# Patient Record
Sex: MALE | Race: OTHER | NOT HISPANIC OR LATINO
[De-identification: names, ages, dates, MRNs, and addresses within clinical notes are randomized per-mention and may not be internally consistent; named-entity substitution may affect disease eponyms.]

---

## 2021-09-28 ENCOUNTER — APPOINTMENT (OUTPATIENT)
Dept: UROLOGY | Facility: CLINIC | Age: 76
End: 2021-09-28
Payer: MEDICARE

## 2021-09-28 PROCEDURE — 51798 US URINE CAPACITY MEASURE: CPT

## 2021-09-28 PROCEDURE — 99204 OFFICE O/P NEW MOD 45 MIN: CPT

## 2021-09-28 NOTE — PHYSICAL EXAM
[General Appearance - Well Developed] : well developed [General Appearance - Well Nourished] : well nourished [Normal Appearance] : normal appearance [Well Groomed] : well groomed [General Appearance - In No Acute Distress] : no acute distress [Edema] : no peripheral edema [Respiration, Rhythm And Depth] : normal respiratory rhythm and effort [Exaggerated Use Of Accessory Muscles For Inspiration] : no accessory muscle use [Abdomen Soft] : soft [Abdomen Tenderness] : non-tender [Costovertebral Angle Tenderness] : no ~M costovertebral angle tenderness [Urethral Meatus] : meatus normal [Scrotum] : the scrotum was normal [Testes Tenderness] : no tenderness of the testes [Testes Mass (___cm)] : there were no testicular masses [Prostate Tenderness] : the prostate was not tender [No Prostate Nodules] : no prostate nodules [Prostate Size ___ gm] : prostate size [unfilled] gm [Normal Station and Gait] : the gait and station were normal for the patient's age [] : no rash [No Focal Deficits] : no focal deficits [Oriented To Time, Place, And Person] : oriented to person, place, and time [Affect] : the affect was normal [Mood] : the mood was normal [Not Anxious] : not anxious [No Palpable Adenopathy] : no palpable adenopathy

## 2021-09-28 NOTE — HISTORY OF PRESENT ILLNESS
[FreeTextEntry1] : Dear Dr. Darryl Mckeon \par \par CC: renal mass, elevated PSA\par \par  HPI: Mr. Ferrer is a 75 year old AA male who resides in [place] who was  being evaluated for 20 lb weight loss over 1 year with the inability to gain weight.  He is now gaining weight by taking Ensure.  As part of the evaluation he underwent a CT\par \par CT: lower pole LEFT kidney 1.8 x 1.6 x 2.0 cm slightly exophytic solid enhancing mass, multiple bilateral simple cysts. markedly enlarged prostate gland with distended bladder and bladder diverticuli.\par \par PSA 7/6/21: 20.66 ng/ml\par Prostate biopsy 2011, negative with Dr. Melendrez\par \par Was taking Saw Palmetto\par Off medication notices weak stream, occasional staining to intimate stream, urinary frequency, nocturia x 2 , and sometimes need to defecate while urinating.\par \par \par PMH: DM, HTN, CAD, peripheral angiopathy\par PSH:none\par FAMHX:no prostate cancer\par SOCIAL: nonsmoker, no ETOH, retired  from [employer] \par ROS: negative 10 system review\par

## 2021-09-28 NOTE — ASSESSMENT
[FreeTextEntry1] : 75 year old AA male who presents today for finding of renal mass and elevated PSA\par -PVR was 555 cc\par -recommend catheter placement today, patient hesitant about having catheter for extended period of time.  Discussed risk of infection and worsening retention.  No hydronephrosis noted on CT report.  Jaquelin hold off on catheter placement.\par -repeat PSA today.\par -prostate MRI\par -given size of renal lesion would recommend AS at this time and plan on repeat imaging in 6 months to evaluate growth.\par \par \par RTC after MRI to discuss next steps to alleviate retention \par \par

## 2021-09-29 LAB
PSA FREE FLD-MCNC: 21 %
PSA FREE SERPL-MCNC: 12 NG/ML
PSA SERPL-MCNC: 58.4 NG/ML

## 2021-10-06 LAB — BACTERIA UR CULT: NORMAL

## 2021-10-26 ENCOUNTER — NON-APPOINTMENT (OUTPATIENT)
Age: 76
End: 2021-10-26

## 2021-11-01 ENCOUNTER — APPOINTMENT (OUTPATIENT)
Dept: UROLOGY | Facility: CLINIC | Age: 76
End: 2021-11-01
Payer: MEDICARE

## 2021-11-01 VITALS
TEMPERATURE: 98.8 F | HEART RATE: 76 BPM | SYSTOLIC BLOOD PRESSURE: 154 MMHG | WEIGHT: 144 LBS | BODY MASS INDEX: 23.14 KG/M2 | DIASTOLIC BLOOD PRESSURE: 76 MMHG | HEIGHT: 66 IN

## 2021-11-01 DIAGNOSIS — Z78.9 OTHER SPECIFIED HEALTH STATUS: ICD-10-CM

## 2021-11-01 PROCEDURE — 99214 OFFICE O/P EST MOD 30 MIN: CPT

## 2021-11-01 NOTE — PHYSICAL EXAM
[General Appearance - Well Developed] : well developed [General Appearance - Well Nourished] : well nourished [Normal Appearance] : normal appearance [Well Groomed] : well groomed [General Appearance - In No Acute Distress] : no acute distress [Edema] : no peripheral edema [Respiration, Rhythm And Depth] : normal respiratory rhythm and effort [Exaggerated Use Of Accessory Muscles For Inspiration] : no accessory muscle use [Abdomen Soft] : soft [Abdomen Tenderness] : non-tender [Abdomen Hernia] : no hernia was discovered [Costovertebral Angle Tenderness] : no ~M costovertebral angle tenderness [Urethral Meatus] : meatus normal [Penis Abnormality] : normal uncircumcised penis [Urinary Bladder Findings] : the bladder was normal on palpation [Scrotum] : the scrotum was normal [Testes Tenderness] : no tenderness of the testes [Testes Mass (___cm)] : there were no testicular masses [Prostate Tenderness] : the prostate was not tender [No Prostate Nodules] : no prostate nodules [Normal Station and Gait] : the gait and station were normal for the patient's age [] : no rash [No Focal Deficits] : no focal deficits [Oriented To Time, Place, And Person] : oriented to person, place, and time [Affect] : the affect was normal [Mood] : the mood was normal [Not Anxious] : not anxious [No Palpable Adenopathy] : no palpable adenopathy

## 2021-11-02 LAB
ANION GAP SERPL CALC-SCNC: 14 MMOL/L
APPEARANCE: ABNORMAL
BACTERIA: NEGATIVE
BASOPHILS # BLD AUTO: 0.04 K/UL
BASOPHILS NFR BLD AUTO: 0.5 %
BILIRUBIN URINE: NEGATIVE
BLOOD URINE: NEGATIVE
BUN SERPL-MCNC: 15 MG/DL
CALCIUM SERPL-MCNC: 9.8 MG/DL
CHLORIDE SERPL-SCNC: 105 MMOL/L
CO2 SERPL-SCNC: 24 MMOL/L
COLOR: NORMAL
CREAT SERPL-MCNC: 1.07 MG/DL
EOSINOPHIL # BLD AUTO: 0.02 K/UL
EOSINOPHIL NFR BLD AUTO: 0.2 %
GLUCOSE QUALITATIVE U: NEGATIVE
GLUCOSE SERPL-MCNC: 98 MG/DL
HCT VFR BLD CALC: 47.2 %
HGB BLD-MCNC: 14.6 G/DL
HYALINE CASTS: 0 /LPF
IMM GRANULOCYTES NFR BLD AUTO: 0.4 %
KETONES URINE: NEGATIVE
LEUKOCYTE ESTERASE URINE: NEGATIVE
LYMPHOCYTES # BLD AUTO: 3.47 K/UL
LYMPHOCYTES NFR BLD AUTO: 43.3 %
MAN DIFF?: NORMAL
MCHC RBC-ENTMCNC: 29.9 PG
MCHC RBC-ENTMCNC: 30.9 GM/DL
MCV RBC AUTO: 96.7 FL
MICROSCOPIC-UA: NORMAL
MONOCYTES # BLD AUTO: 0.49 K/UL
MONOCYTES NFR BLD AUTO: 6.1 %
NEUTROPHILS # BLD AUTO: 3.96 K/UL
NEUTROPHILS NFR BLD AUTO: 49.5 %
NITRITE URINE: NEGATIVE
PH URINE: 6
PLATELET # BLD AUTO: 262 K/UL
POTASSIUM SERPL-SCNC: 4.4 MMOL/L
PROTEIN URINE: NORMAL
RBC # BLD: 4.88 M/UL
RBC # FLD: 13.9 %
RED BLOOD CELLS URINE: 1 /HPF
SODIUM SERPL-SCNC: 143 MMOL/L
SPECIFIC GRAVITY URINE: 1.02
SQUAMOUS EPITHELIAL CELLS: 1 /HPF
URIC ACID CRYSTALS: ABNORMAL
UROBILINOGEN URINE: NORMAL
WBC # FLD AUTO: 8.01 K/UL
WHITE BLOOD CELLS URINE: 1 /HPF

## 2021-11-02 NOTE — HISTORY OF PRESENT ILLNESS
[FreeTextEntry1] : Dear ARANZA Gonzalez,\par Dear Dr Darryl Mckeon (PCP fax #[phone], phone # [phone]),\par \par Thank you so much for the referral to help care for your patient.\par \par Chief Complaint Biopsy Consult\par Date of first visit: 11/01/2021\par Retired from [employer]\par \par NATIVIDAD RODRIGUEZ is a 76 year old Black man with PMHx DM, BPH, HTN, HLD, 20 lb wt loss/1 year with incidental finding of renal mass (on AS with Dr Dubois) and elevated PSA who presents for biopsy consult. His most recent PSA is 58.4 ng/mL, urine culture was negative at the time. MRI on 10/18/21 demonstrated a PIRADS 5 lesion, left mid pzpl, as well as multiple enhancing bone lesions concerning for metastases. His PSA density is elevated 0.41 ng/ml/cc. He has had one prior negative biopsy in 2011. He does not have family hx of  malignancies.\par \par PSA Hx:\par 58.40 on 9/29/21\par 20.66 7/6/21\par \par MRI at [facility] on 10/18/2021. 5.9 x 6.1 x 7.6 cm; volume 142 mL prostate with PIRADS 5  lesion measuring 1.6 cm Left posterolateral midgland peripheral zone .  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 The images have been reviewed and clinical implications discussed with the patient.\par \par Biopsy Hx\par 2011 with Dr Melendrez-negative\par \par 11/01/2021 \par IPSS 14 QOL 3 \par BOB 17\par \par Prostate cancer screening: the patient and I spoke at length about prostate cancer screening, its risks and its benefits. The patient has 4 (age, PSA, race, MRI) risk factors for prostate cancer.  He understands that many men with prostate cancer will die with the disease rather than of it and we also discussed the results large multi-center American and  prostate cancer screening trials. He also understands that PSA in and of itself does not diagnose prostate cancer but only assesses risk to a certain degree. The patient understands that to definitively screen for prostate cancer, a biopsy is required and this procedure has risks, including bleeding, infection, ED and urinary retention. The patient opted to proceed with fusion biopsy.\par \par The patient denies fevers, chills, nausea and or vomiting and no unexplained weight loss.\par \par All pertinent laboratory, films and physician notes were reviewed.  Questionnaire results were discussed with patient.\par \par I spent 31 minutes of non-face to face time reviewing the patient's records, chart, uploading processing MR images, transferring MR images from PACS to an independent workstation, reviewing images on independent workstation, speaking with their physician and planning their prostate biopsy and preparation of an upcoming visit for 11/01/2021 .  The imaging and planning was highly complex and took this entire time.

## 2021-11-02 NOTE — ASSESSMENT
[FreeTextEntry1] : 76 year old Black man with PMHx DM, BPH, HTN, HLD, 20 lb wt loss/1 year with incidental finding of renal mass (on AS with Dr Dubois) and elevated PSA who presents for biopsy consult. His most recent PSA is 58.4 ng/mL, urine culture was negative at the time. MRI on 10/18/21 demonstrated a PIRADS 5 lesion, left mid pzpl, as well as multiple enhancing bone lesions concerning for metastases. His PSA density is elevated 0.41 ng/ml/cc. He has had one prior negative biopsy in 2011. Neg FH, neg GLENN.\par \par 1. Start calcium/vit D supplementation for bone health in anticipation of upcoming treatment\par 2. --start Flomax- he patient understands that this medication may cause dizziness, retrograde ejaculation or nasal congestion among other complications. I recommended that the patient take this medication at night. If the side effects become too bothersome, the medication can be discontinued.\par 3. Refer to Dr Mancini and PSMA after biopsy\par \par IRB Notification\par \par The patient has been made aware of the opportunity to participate in our MR US fusion guided biopsy trial testing the next generation biopsy technology.  This is an IRB approved trial (IRB #).  He is already being consented for a standard MR US fusion guided biopsy therefore there is no change in his clinical work flow.  He has been given a copy of the consent to review before the biopsy date and given an opportunity to contact us with any further questions.  He will be consented on the day of the biopsy procedure.\par \par He understands that many men with prostate cancer will die with the disease rather than of it and we also discussed the results large multi-center American and  prostate cancer screening trials. He also understands that PSA in and of itself does not diagnose prostate cancer but only assesses risk to a certain degree. The patient understands that to definitively screen for prostate cancer, a biopsy is required and this procedure has risks, including bleeding, infection, ED and urinary retention. The patient opted to move forward with the biopsy.\par \par The patient is aware to expect hematuria x 2 weeks and upto 4 weeks of hematospermia.  There is a risk of infection albeit much lower than a transrectal approach. In some cases patients can experience erectile dysfunction but this is usually self limiting.  Any fever/chills after the biopsy the patient is to contact the office and go to the ER for an immediate evaluation. He has been given paper instructions outlining these items - which includes medications to avoid prior to surgery.\par \par 1. CBC, BMP, PSA, Covid Test, UA UCx. EKG\par 2. Medical Clearance\par 3. TP biopsy at [facility]\par 4. follow up 2 weeks after biopsy with his primary urologist or ourselves.\par 5. we will call with the path results once they are resulted.\par \par Thank you very much for allowing me to assist in the care of this patient. Should you have any additional questions or concerns please do not hesitate to contact me.\par \par \par Sincerely,\par \par \par Patrick Tejada D.O.\par  of Urology and Radiology\par  of Urology at [facility]\par System Director for Prostate Cancer\par [address], [address] [facility], [facility]\par Phone: [phone]\par

## 2021-11-03 LAB — BACTERIA UR CULT: NORMAL

## 2021-11-08 ENCOUNTER — OUTPATIENT (OUTPATIENT)
Dept: OUTPATIENT SERVICES | Facility: HOSPITAL | Age: 76
LOS: 1 days | End: 2021-11-08
Payer: MEDICARE

## 2021-11-08 PROCEDURE — G1004: CPT

## 2021-11-08 PROCEDURE — A9503: CPT

## 2021-11-08 PROCEDURE — 78306 BONE IMAGING WHOLE BODY: CPT | Mod: 26,MG

## 2021-11-08 PROCEDURE — 78306 BONE IMAGING WHOLE BODY: CPT | Mod: MG

## 2021-11-09 LAB — SARS-COV-2 N GENE NPH QL NAA+PROBE: NOT DETECTED

## 2021-11-10 ENCOUNTER — NON-APPOINTMENT (OUTPATIENT)
Age: 76
End: 2021-11-10

## 2021-11-10 ENCOUNTER — TRANSCRIPTION ENCOUNTER (OUTPATIENT)
Age: 76
End: 2021-11-10

## 2021-11-11 ENCOUNTER — APPOINTMENT (OUTPATIENT)
Dept: UROLOGY | Facility: AMBULATORY SURGERY CENTER | Age: 76
End: 2021-11-11

## 2021-11-11 ENCOUNTER — RESULT REVIEW (OUTPATIENT)
Age: 76
End: 2021-11-11

## 2021-11-11 ENCOUNTER — OUTPATIENT (OUTPATIENT)
Dept: OUTPATIENT SERVICES | Facility: HOSPITAL | Age: 76
LOS: 1 days | Discharge: HOME | End: 2021-11-11
Payer: MEDICARE

## 2021-11-11 PROCEDURE — 55706 BX PRST8 NDL SAT SAMPLING: CPT | Mod: 22

## 2021-11-11 PROCEDURE — 88341 IMHCHEM/IMCYTCHM EA ADD ANTB: CPT | Mod: 26

## 2021-11-11 PROCEDURE — 55706 BX PRST8 NDL SAT SAMPLING: CPT | Mod: AS

## 2021-11-11 PROCEDURE — 76872 US TRANSRECTAL: CPT | Mod: 26

## 2021-11-11 PROCEDURE — 88305 TISSUE EXAM BY PATHOLOGIST: CPT | Mod: 26

## 2021-11-11 PROCEDURE — 88342 IMHCHEM/IMCYTCHM 1ST ANTB: CPT | Mod: 26

## 2021-11-12 ENCOUNTER — APPOINTMENT (OUTPATIENT)
Dept: UROLOGY | Facility: CLINIC | Age: 76
End: 2021-11-12
Payer: MEDICARE

## 2021-11-12 VITALS — HEART RATE: 80 BPM | SYSTOLIC BLOOD PRESSURE: 166 MMHG | DIASTOLIC BLOOD PRESSURE: 82 MMHG | TEMPERATURE: 99.2 F

## 2021-11-12 PROCEDURE — 99214 OFFICE O/P EST MOD 30 MIN: CPT | Mod: 25

## 2021-11-12 PROCEDURE — 51702 INSERT TEMP BLADDER CATH: CPT | Mod: 58

## 2021-11-15 LAB — SURGICAL PATHOLOGY STUDY: SIGNIFICANT CHANGE UP

## 2021-11-15 NOTE — ASSESSMENT
[FreeTextEntry1] : 76 year old male with know history of urinary retention, PIRADS 4 lesion in prostate and several osseous metastases seen on Bone Scan here for catheter removal.\par -catheter removed today intact after instilling 200cc sterile Water\par -patient void scant amount, PVR was 600 cc\par -catheter reinserted without difficulty\par -increase Tamsulosin to 2 tablets, reviewed goals and side effects of medication \par -start Finasteride\par -Bone scan reviewed with patient and his partner, discussed metastatic findings, will need follow up with medical oncology pending pathology\par -Needs to follow up with Dr. Dubois to discuss surgical procedure given retention history \par \par RTC 1 week for void trial\par

## 2021-11-15 NOTE — HISTORY OF PRESENT ILLNESS
[FreeTextEntry1] : This is a 76 year old male who underwent transperineal fusion biopsy yesterday.\par He is feeling well post biopsy\par Denies any fever/chills, nausea/vomiting.  Urine in catheter is clear red, denies any suprapubic pain.\par \par He was noted to have elevated PVR  pre biopsy\par   cc on 9/28/21 and 272 cc on 11/1/21.\par \par MRI with 1.6 cm left posterolateral midgland peripheral zone lesion abutting the capsule without visualized gross EPE.  PIRADS 4 with 142 mL prostate.\par \par Noted on pMRI was suggestion of metastatic bone lesion\par \par None scan completed on 11/8/21 showed several osseous metastases\par \par He presents today for a trial void.

## 2021-11-15 NOTE — PHYSICAL EXAM
[General Appearance - Well Developed] : well developed [General Appearance - Well Nourished] : well nourished [Normal Appearance] : normal appearance [Well Groomed] : well groomed [General Appearance - In No Acute Distress] : no acute distress [Abdomen Soft] : soft [Abdomen Tenderness] : non-tender [Costovertebral Angle Tenderness] : no ~M costovertebral angle tenderness [Oriented To Time, Place, And Person] : oriented to person, place, and time [Affect] : the affect was normal [Mood] : the mood was normal [Not Anxious] : not anxious [Normal Station and Gait] : the gait and station were normal for the patient's age [No Focal Deficits] : no focal deficits [] : no respiratory distress [Respiration, Rhythm And Depth] : normal respiratory rhythm and effort [Exaggerated Use Of Accessory Muscles For Inspiration] : no accessory muscle use

## 2021-11-16 ENCOUNTER — APPOINTMENT (OUTPATIENT)
Dept: UROLOGY | Facility: CLINIC | Age: 76
End: 2021-11-16
Payer: MEDICARE

## 2021-11-16 ENCOUNTER — NON-APPOINTMENT (OUTPATIENT)
Age: 76
End: 2021-11-16

## 2021-11-16 VITALS
DIASTOLIC BLOOD PRESSURE: 72 MMHG | HEART RATE: 71 BPM | SYSTOLIC BLOOD PRESSURE: 151 MMHG | OXYGEN SATURATION: 98 % | TEMPERATURE: 98.2 F

## 2021-11-16 PROCEDURE — 99214 OFFICE O/P EST MOD 30 MIN: CPT | Mod: 24

## 2021-11-16 NOTE — HISTORY OF PRESENT ILLNESS
[FreeTextEntry1] : This is a 76 year old male who underwent transperineal fusion biopsy 11/11/21, failed void trial after biopsy requiring catheter to be placed\par He was noted to have elevated PVR pre biopsy\par   cc on 9/28/21, 272 cc on 11/1/21, and 600 cc on 11/12/21.\par \par \par  pMRI was suggestion of metastatic bone lesion with PIRADS 5 lesion and 142 mL prostate\par Bone scan completed on 11/8/21 showed several osseous metastases\par He does report some discomfort on Left lower back area.\par \par The targeted biopsy was positive for Jackson GG4 cancer (lesion left mid pzpl) \par The standard biopsy detected GG4 cancer which did overlap with the targeted biopsy. 2/12 cores. location(s). LPA GG4, RPA GG1\par \par \par Presents today for trial void\par \par

## 2021-11-16 NOTE — ASSESSMENT
[FreeTextEntry1] : 76 year old male with Maira GG4 cancer and ongoing urinary retention\par -Catheter removed today intact\par -PVR was 258 cc\par -pathology reviewed from biopsy\par -continue with Tamsulosin 2 tabs and Finasteride\par -discussed follow up with Dr. Brito for HoLEP given retention history\par -Follow up with Dr. Reed for further management of metastatic prostate cancer\par -touched on need for ADT, but with further discuss after he see Dr. Reed\par \par \par

## 2021-11-23 ENCOUNTER — APPOINTMENT (OUTPATIENT)
Dept: UROLOGY | Facility: CLINIC | Age: 76
End: 2021-11-23
Payer: MEDICARE

## 2021-11-23 PROCEDURE — 99215 OFFICE O/P EST HI 40 MIN: CPT | Mod: 95

## 2021-11-23 NOTE — ASSESSMENT
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 He is scheduled to see Dr. Reed to discuss treatment of his prostate cancer. He was referred to me to discuss treatment for his enlarged prostate and LUTS. We discussed that the only way to treat both his prostate cancer and BPH would be to remove the enitre prostate, however, radical prostatectomy would not add survival benefit with metastatic prostate cancer and comes with significant risks. We discussed that a prostate debulking procedure can be performed as treatment for BPH only. \par \par I made it clear that because of his large prostate size > 80 grams, AUA recommendation is to perform a simple prostatectomy due to the ability to obtain effective and durable improvement in voiding symptoms. A simple prostatectomy can be performed via an open approach, laparoscopic approach or transurethral laser enucleation approach. We discussed the risks and benefits of each approach. Overall, the long term outcomes are similar. However, the laser enucleation procedure has the least morbidity with quickest recovery of the three options. Therefore, I have recommended laser enucleation of the prostate performed via a transurethral approach.\par \par Mr. RODRIGUEZ decided to proceed with  laser enucleation of prostate followed by prostate morcellation. Therefore, I spent a good amount of time discussing the risks and benefits of this procedure. We discussed potential risks of incontinence, retrograde ejaculation and infertility, erectile dysfunction, irritative voiding symptoms, injury to the urethra and bladder, rare need to convert to an open surgery.\par  - ThuLEP at [facility]\par  - Will need PCP clearance, pre-op bloodwork, urine culture, Covid testing\par  - F/U with Dr. Dubois and Dr. Reed for continued management of metastatic prostate cancer and 2 cm renal mass seen on CT\par \par \par

## 2021-12-06 ENCOUNTER — LABORATORY RESULT (OUTPATIENT)
Age: 76
End: 2021-12-06

## 2021-12-07 LAB
ALBUMIN SERPL ELPH-MCNC: 4.2 G/DL
ALP BLD-CCNC: 128 U/L
ALT SERPL-CCNC: 16 U/L
ANION GAP SERPL CALC-SCNC: 12 MMOL/L
APPEARANCE: CLEAR
APTT BLD: 33.2 SEC
AST SERPL-CCNC: 14 U/L
BACTERIA: NEGATIVE
BASOPHILS # BLD AUTO: 0.05 K/UL
BASOPHILS NFR BLD AUTO: 0.7 %
BILIRUB SERPL-MCNC: 0.6 MG/DL
BILIRUBIN URINE: NEGATIVE
BLOOD URINE: NEGATIVE
BUN SERPL-MCNC: 16 MG/DL
CALCIUM SERPL-MCNC: 10.1 MG/DL
CHLORIDE SERPL-SCNC: 104 MMOL/L
CO2 SERPL-SCNC: 25 MMOL/L
COLOR: YELLOW
CREAT SERPL-MCNC: 1.14 MG/DL
EOSINOPHIL # BLD AUTO: 0.03 K/UL
EOSINOPHIL NFR BLD AUTO: 0.4 %
GLUCOSE QUALITATIVE U: NEGATIVE
GLUCOSE SERPL-MCNC: 113 MG/DL
HCT VFR BLD CALC: 42.8 %
HGB BLD-MCNC: 13.4 G/DL
HYALINE CASTS: 2 /LPF
IMM GRANULOCYTES NFR BLD AUTO: 0.4 %
KETONES URINE: NEGATIVE
LEUKOCYTE ESTERASE URINE: NEGATIVE
LYMPHOCYTES # BLD AUTO: 2.49 K/UL
LYMPHOCYTES NFR BLD AUTO: 33.2 %
MAN DIFF?: NORMAL
MCHC RBC-ENTMCNC: 30.4 PG
MCHC RBC-ENTMCNC: 31.3 GM/DL
MCV RBC AUTO: 97.1 FL
MICROSCOPIC-UA: NORMAL
MONOCYTES # BLD AUTO: 0.75 K/UL
MONOCYTES NFR BLD AUTO: 10 %
NEUTROPHILS # BLD AUTO: 4.14 K/UL
NEUTROPHILS NFR BLD AUTO: 55.3 %
NITRITE URINE: NEGATIVE
PH URINE: 5.5
PLATELET # BLD AUTO: 253 K/UL
POTASSIUM SERPL-SCNC: 4.9 MMOL/L
PROT SERPL-MCNC: 6.9 G/DL
PROTEIN URINE: NEGATIVE
RBC # BLD: 4.41 M/UL
RBC # FLD: 13.9 %
RED BLOOD CELLS URINE: 1 /HPF
SODIUM SERPL-SCNC: 141 MMOL/L
SPECIFIC GRAVITY URINE: 1.02
SQUAMOUS EPITHELIAL CELLS: 1 /HPF
UROBILINOGEN URINE: NORMAL
WBC # FLD AUTO: 7.49 K/UL
WHITE BLOOD CELLS URINE: 2 /HPF

## 2021-12-08 ENCOUNTER — TRANSCRIPTION ENCOUNTER (OUTPATIENT)
Age: 76
End: 2021-12-08

## 2021-12-08 ENCOUNTER — APPOINTMENT (OUTPATIENT)
Dept: HEMATOLOGY ONCOLOGY | Facility: CLINIC | Age: 76
End: 2021-12-08
Payer: MEDICARE

## 2021-12-08 ENCOUNTER — LABORATORY RESULT (OUTPATIENT)
Age: 76
End: 2021-12-08

## 2021-12-08 ENCOUNTER — RESULT REVIEW (OUTPATIENT)
Age: 76
End: 2021-12-08

## 2021-12-08 VITALS
SYSTOLIC BLOOD PRESSURE: 188 MMHG | DIASTOLIC BLOOD PRESSURE: 88 MMHG | HEART RATE: 94 BPM | RESPIRATION RATE: 18 BRPM | OXYGEN SATURATION: 99 % | HEIGHT: 66 IN | WEIGHT: 139 LBS | TEMPERATURE: 98.9 F | BODY MASS INDEX: 22.34 KG/M2

## 2021-12-08 VITALS
OXYGEN SATURATION: 99 % | WEIGHT: 140.88 LBS | TEMPERATURE: 98 F | DIASTOLIC BLOOD PRESSURE: 69 MMHG | RESPIRATION RATE: 16 BRPM | HEART RATE: 83 BPM | HEIGHT: 66 IN | SYSTOLIC BLOOD PRESSURE: 132 MMHG

## 2021-12-08 DIAGNOSIS — Z80.3 FAMILY HISTORY OF MALIGNANT NEOPLASM OF BREAST: ICD-10-CM

## 2021-12-08 LAB
BACTERIA UR CULT: NORMAL
SARS-COV-2 N GENE NPH QL NAA+PROBE: NOT DETECTED

## 2021-12-08 PROCEDURE — 99205 OFFICE O/P NEW HI 60 MIN: CPT

## 2021-12-08 NOTE — ASSESSMENT
[FreeTextEntry1] : Mr. Ferrer is a 76 year old male with history of BPH, HTN, and HLD who presents to clinic today for evaluation of newly diagnosed met prostate cancer Grade Grp 4 ([place] score 4+4=8) with bone mets, PSA 58 9/28/21 referred by Dr. Dubois. Here for further mgmt. \par \par PSA : 58.40 on 9/29/21, 20.66 7/6/21 \par \par 10/18/21 MRI at [facility] \par -5.9 x 6.1 x 7.6 cm; volume 142 mL prostate with PIRADS 5 lesion measuring 1.6 cm Left posterolateral midgland peripheral zone. 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 \par \par 11/8/21 Bone scans  \par Findings: Focal radiotracer activity in the right clavicular head, probable right second rib, probable T6 vertebral body, and left sacrum, suspicious for osseous metastasis. Focal radiotracer activity in the L3 vertebral body, indeterminate. \par Symmetric bilateral renal radiotracer activity. No abnormal focal soft tissue activity. \par Distended urinary bladder with urinary radiotracer activity, obscuring pelvic bones, limiting evaluation. \par Impression: \par Several osseous metastases as described above. \par \par 11/11/21 \par Surgical Pathology Report  \par Final Diagnosis \par 1. Prostate, left anterior apex, biopsy \par -Benign prostate tissue. \par 2. Prostate, left anterior base, biopsy \par -Benign prostate tissue. \par 3. Prostate, right anterior apex, biopsy \par -Benign prostate tissue. \par 4. Prostate, right anterior base, biopsy \par -Benign prostate tissue. \par 5. Prostate, midline apex, biopsy \par -Benign prostate tissue. \par 6. Prostate, midline base, biopsy \par -Benign prostate tissue. \par 7. Prostate, left posterior apex, biopsy \par -Adenocarcinoma of the prostate, Prognostic Grade Group 4 (Maira score 4+4=8) involving 70% (7.5 mm in length) of 1 of 1 core(s). \par 8. Prostate, left posterior base, biopsy \par -Benign prostate tissue. \par 9. Prostate, right posterior apex, biopsy \par -Adenocarcinoma of the prostate, Prognostic Grade Group 1 ([place] score 3+3=6) involving less than 5% (less than 0.5 mm in length) of 1 of 1 core(s). \par 10. Prostate, right posterior base, biopsy \par -Benign prostate tissue. \par 11. Prostate, left lateral, biopsy \par -Benign prostate tissue. \par 12. Prostate, right lateral, biopsy \par -Benign prostate tissue. \par 13. Prostate, left mid PZPL, biopsy \par -Adenocarcinoma of the prostate, Prognostic Grade Group 4 ([place] score 4+4=8) involving 60% (5.5 mm in length) of 1 of 4 core(s). \par -Adenocarcinoma of the prostate, Prognostic Grade Group 1 (Maira score 3+3=6) involving 5% (0.5 mm in length) of 1 of 4 core(s). \par -A total of 2 of 4 cores involved by carcinoma. \par Note: Cribriform [place] pattern 4 is present. 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 Risks, benefits and alternatives were discussed. He was also provided with the opportunity to asks questions. He signed consent for the treatment.\par -RTC 2 weeks\par \par Trial data\par The PEACE-1 trial had a 2 × 2 factorial design. Patients with de aishwarya metastatic castration-sensitive prostate cancer (n = 1,173) were randomly assigned 1:1:1:1 to receive the standard of care (n = 296); the standard of care plus abiraterone (n = 292); the standard of care plus radiotherapy (n = 293); or the standard of care plus abiraterone plus radiotherapy (n = 292). All patients had continuous androgen-deprivation therapy. Standard treatments were defined as androgen-deprivation therapy with or without docetaxel. Stratification factors included Eastern Cooperative Oncology Group performance status (0 or 1), metastatic sites (lymph node vs bone vs visceral), type of castration (orchiectomy vs androgen-deprivation therapy), and docetaxel (yes or no).\par \par Disease and demographic factors were well balanced at baseline. At baseline, in the population given androgen-deprivation therapy plus docetaxel (accounting for 710 patients), the median age was 66, and about 78% had a Maira score > 8. About 80% had bone-alone metastasis; 8% had lymph-node–alone metastasis; and about 12% had visceral metastasis. About 36% had low-volume disease, and 64% had high-volume disease.\par \par Co-primary endpoints were radiographic ­progression-free survival and overall survival. Radiographic progression-free survival was significantly improved with the addition of abiraterone acetate plus prednisone to androgen-deprivation therapy plus docetaxel (P < .0001). At a median follow-up of 4.5 years, in patients treated with abiraterone acetate plus prednisone and the standard of care, 139 events were reported; at a median follow-up of 2 years in the standard-of-care-alone arm, there were 211 events.\par \par Looking at radiographic progression-free survival in the population given androgen-deprivation therapy plus docetaxel (with or without radiotherapy), according to disease burden, the addition of abiraterone acetate plus prednisone benefited both groups, but the benefit tended to be greater in the group with high-volume disease. Median radiographic progression-free survival was 4.1 years when abiraterone acetate plus prednisone was added vs 1.6 years for those with high-volume disease who received the standard of care (< .0001), compared with not reached vs a median of 2.7 years in those with low-volume disease (P = .006).\par \par Overall Survival\par \par Follow-up for overall survival was 4.4 years in the overall population; 5.7 years in the control arm of androgen-deprivation therapy alone with or without radiotherapy; and 3.8 years in the control arm of androgen-deprivation therapy plus docetaxel with or without radiotherapy. Overall survival was improved by 25% with the addition of abiraterone acetate plus prednisone to androgen-deprivation therapy plus docetaxel vs the standard of care alone: not reached vs a median of 4.4 years, respectively (P = .017).\par \par A subgroup analysis of overall survival showed a benefit for abiraterone acetate plus prednisone added to androgen-deprivation therapy plus docetaxel in all subgroups, including those stratified by receipt of radiotherapy, performance status, type of castration, and metastatic burden. In patients with high-volume disease, overall survival was improved by 28% with the addition of abiraterone acetate plus prednisone to androgen-deprivation therapy plus docetaxel: median of 5.1 years vs 3.5 years with the standard of care (P = .019). In patients with low-volume disease, overall survival is immature at this point, and medians were not reached in either group.\par \par The overall survival benefit translates to a median lifetime gain of more than 1.5 years for men with high-volume metastases.\par

## 2021-12-08 NOTE — ASU PATIENT PROFILE, ADULT - FALL HARM RISK - UNIVERSAL INTERVENTIONS
Bed in lowest position, wheels locked, appropriate side rails in place/Call bell, personal items and telephone in reach/Instruct patient to call for assistance before getting out of bed or chair/Non-slip footwear when patient is out of bed/[place] to call system/Physically safe environment - no spills, clutter or unnecessary equipment/Purposeful Proactive Rounding/Room/bathroom lighting operational, light cord in reach

## 2021-12-08 NOTE — HISTORY OF PRESENT ILLNESS
[de-identified] : Mr. Ferrer is a 76 year old male with history of BPH, HTN, HLD who presents to clinic today for evaluation of newly diagnosed met prostate cancer Grade Grp 4 (Wimbledon score 4+4=8) with bone mets, PSA 58 9/28/21 referred by Dr. Dubois. Here for further mgmt. \par \par Colonoscopy in 2013, reportedly normal\par FMH- Sister with breast cancer, Half brother with prostate cancer\par \par ONC Hx\par Mr. Ferrer was being evaluated for 20 lb weight loss over 1 year with the inability to gain weight. He underwent CT scans which showed lower pole LEFT kidney 1.8 x 1.6 x 2.0 cm slightly exophytic solid enhancing mass, multiple bilateral simple cysts. markedly enlarged prostate gland with distended bladder and bladder diverticuli. PSA was 20.66 on 7/6/21. He had prostate biopsy with Dr. Melendrez  in 2011 which was negative. He was referred to Dr. Dubois on 9/28/21 for evaluation of kidney mass and elevated PSA. He notices weak stream, occasional staining to intimate stream, urinary frequency, and nocturia x 2.  He repeated PSA on 9/28/21 which was 58.40. He had MRI performed on 10/18/21 which showed 5.9 x 6.1 x 7.6 cm; volume 142 mL prostate with PIRADS 5 lesion measuring 1.6 cm Left posterolateral midgland peripheral zone. 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 Bone scans on 11/8/21 showed several osseous metastases. Dr. Tejada performed a targeted prostate biopsy with the UroNav MRI fusion on 11/11/20, pathology confirmed Adenocarcinoma of the prostate, Prognostic Grade Group 4 (Maira score 4+4=8). He is scheduled for ThuLEP with Dr. Brito on  12/9/21. \par \par 10/18/21 MRI at [place] \par -5.9 x 6.1 x 7.6 cm; volume 142 mL prostate with PIRADS 5 lesion measuring 1.6 cm Left posterolateral midgland peripheral zone. 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 \par \par 11/8/21 Bone scans  \par Findings: Focal radiotracer activity in the right clavicular head, probable right second rib, probable T6 vertebral body, and left sacrum, suspicious for osseous metastasis. Focal radiotracer activity in the L3 vertebral body, indeterminate. \par Symmetric bilateral renal radiotracer activity. No abnormal focal soft tissue activity. \par Distended urinary bladder with urinary radiotracer activity, obscuring pelvic bones, limiting evaluation. \par Impression: \par Several osseous metastases as described above. \par \par 11/11/21 \par Surgical Pathology Report  \par Final Diagnosis \par 1. Prostate, left anterior apex, biopsy \par -Benign prostate tissue. \par 2. Prostate, left anterior base, biopsy \par -Benign prostate tissue. \par 3. Prostate, right anterior apex, biopsy \par -Benign prostate tissue. \par 4. Prostate, right anterior base, biopsy \par -Benign prostate tissue. \par 5. Prostate, midline apex, biopsy \par -Benign prostate tissue. \par 6. Prostate, midline base, biopsy \par -Benign prostate tissue. \par 7. Prostate, left posterior apex, biopsy \par -Adenocarcinoma of the prostate, Prognostic Grade Group 4 (Maira score 4+4=8) involving 70% (7.5 mm in length) of 1 of 1 core(s). \par 8. Prostate, left posterior base, biopsy \par -Benign prostate tissue. \par 9. Prostate, right posterior apex, biopsy \par -Adenocarcinoma of the prostate, Prognostic Grade Group 1 (Wimbledon score 3+3=6) involving less than 5% (less than 0.5 mm in length) of 1 of 1 core(s). \par 10. Prostate, right posterior base, biopsy \par -Benign prostate tissue. \par 11. Prostate, left lateral, biopsy \par -Benign prostate tissue. \par 12. Prostate, right lateral, biopsy \par -Benign prostate tissue. \par 13. Prostate, left mid PZPL, biopsy \par -Adenocarcinoma of the prostate, Prognostic Grade Group 4 (Wimbledon score 4+4=8) involving 60% (5.5 mm in length) of 1 of 4 core(s). \par -Adenocarcinoma of the prostate, Prognostic Grade Group 1 (Wimbledon score 3+3=6) involving 5% (0.5 mm in length) of 1 of 4 core(s). \par -A total of 2 of 4 cores involved by carcinoma. \par Note: Cribriform Wimbledon pattern 4 is present.  [de-identified] : Patient presents to clinic today for initial consultation with [facility], [place], operated by [facility]. Patient states he has some throat hoarseness since diagnosis and states he is anxious. Patient denies SOB/CP, n/v/c/d, fever, urination issues. Patient is aware of his prostate cancer and wants to set up a plan for treatment today.

## 2021-12-08 NOTE — REVIEW OF SYSTEMS
[Hoarseness] : hoarseness [Anxiety] : anxiety [Negative] : Allergic/Immunologic [Dysphagia] : no dysphagia [Loss of Hearing] : no loss of hearing [Nosebleeds] : no nosebleeds [Odynophagia] : no odynophagia [Mucosal Pain] : no mucosal pain

## 2021-12-09 ENCOUNTER — INPATIENT (INPATIENT)
Facility: HOSPITAL | Age: 76
LOS: 0 days | Discharge: HOME | DRG: 714 | End: 2021-12-10
Attending: SURGERY | Admitting: SURGERY
Payer: COMMERCIAL

## 2021-12-09 ENCOUNTER — RESULT REVIEW (OUTPATIENT)
Age: 76
End: 2021-12-09

## 2021-12-09 ENCOUNTER — APPOINTMENT (OUTPATIENT)
Dept: UROLOGY | Facility: HOSPITAL | Age: 76
End: 2021-12-09

## 2021-12-09 LAB
25(OH)D3 SERPL-MCNC: 37.4 NG/ML
ALBUMIN SERPL ELPH-MCNC: 3.3 G/DL
ALP BLD-CCNC: 112 U/L
ALT SERPL-CCNC: 23 U/L
ANION GAP SERPL CALC-SCNC: 5 MMOL/L
AST SERPL-CCNC: 24 U/L
BILIRUB SERPL-MCNC: 0.7 MG/DL
BLD GP AB SCN SERPL QL: NEGATIVE — SIGNIFICANT CHANGE UP
BUN SERPL-MCNC: 16 MG/DL
CALCIUM SERPL-MCNC: 9.6 MG/DL
CHLORIDE SERPL-SCNC: 105 MMOL/L
CO2 SERPL-SCNC: 31 MMOL/L
CREAT SERPL-MCNC: 1.3 MG/DL
GLUCOSE SERPL-MCNC: 106 MG/DL
POTASSIUM SERPL-SCNC: 4.9 MMOL/L
PROT SERPL-MCNC: 7.4 G/DL
PSA FREE FLD-MCNC: 13 %
PSA FREE SERPL-MCNC: 29.2 NG/ML
PSA SERPL-MCNC: 220 NG/ML
RH IG SCN BLD-IMP: POSITIVE — SIGNIFICANT CHANGE UP
SODIUM SERPL-SCNC: 141 MMOL/L

## 2021-12-09 PROCEDURE — 88305 TISSUE EXAM BY PATHOLOGIST: CPT | Mod: 26

## 2021-12-09 PROCEDURE — 88300 SURGICAL PATH GROSS: CPT | Mod: 26

## 2021-12-09 PROCEDURE — 52649 PROSTATE LASER ENUCLEATION: CPT | Mod: 22

## 2021-12-09 RX ORDER — ATORVASTATIN CALCIUM 80 MG/1
1 TABLET, FILM COATED ORAL
Qty: 0 | Refills: 0 | DISCHARGE

## 2021-12-09 RX ORDER — ONDANSETRON 8 MG/1
4 TABLET, FILM COATED ORAL EVERY 6 HOURS
Refills: 0 | Status: DISCONTINUED | OUTPATIENT
Start: 2021-12-09 | End: 2021-12-10

## 2021-12-09 RX ORDER — ATROPA BELLADONNA AND OPIUM 16.2; 6 MG/1; MG/1
1 SUPPOSITORY RECTAL EVERY 12 HOURS
Refills: 0 | Status: DISCONTINUED | OUTPATIENT
Start: 2021-12-09 | End: 2021-12-10

## 2021-12-09 RX ORDER — SODIUM CHLORIDE 9 MG/ML
1000 INJECTION, SOLUTION INTRAVENOUS
Refills: 0 | Status: DISCONTINUED | OUTPATIENT
Start: 2021-12-09 | End: 2021-12-10

## 2021-12-09 RX ORDER — OXYBUTYNIN CHLORIDE 5 MG
5 TABLET ORAL EVERY 8 HOURS
Refills: 0 | Status: DISCONTINUED | OUTPATIENT
Start: 2021-12-09 | End: 2021-12-10

## 2021-12-09 RX ORDER — AMLODIPINE BESYLATE 2.5 MG/1
5 TABLET ORAL DAILY
Refills: 0 | Status: DISCONTINUED | OUTPATIENT
Start: 2021-12-10 | End: 2021-12-10

## 2021-12-09 RX ORDER — ATORVASTATIN CALCIUM 80 MG/1
10 TABLET, FILM COATED ORAL AT BEDTIME
Refills: 0 | Status: DISCONTINUED | OUTPATIENT
Start: 2021-12-09 | End: 2021-12-10

## 2021-12-09 RX ORDER — ACETAMINOPHEN 500 MG
650 TABLET ORAL EVERY 6 HOURS
Refills: 0 | Status: DISCONTINUED | OUTPATIENT
Start: 2021-12-09 | End: 2021-12-10

## 2021-12-09 RX ORDER — CEFAZOLIN SODIUM 1 G
1000 VIAL (EA) INJECTION EVERY 8 HOURS
Refills: 0 | Status: COMPLETED | OUTPATIENT
Start: 2021-12-09 | End: 2021-12-10

## 2021-12-09 RX ORDER — AMLODIPINE BESYLATE 2.5 MG/1
1 TABLET ORAL
Qty: 0 | Refills: 0 | DISCHARGE

## 2021-12-09 RX ADMIN — Medication 100 MILLIGRAM(S): at 22:35

## 2021-12-09 RX ADMIN — Medication 100 MILLIGRAM(S): at 15:21

## 2021-12-10 ENCOUNTER — TRANSCRIPTION ENCOUNTER (OUTPATIENT)
Age: 76
End: 2021-12-10

## 2021-12-10 VITALS
HEART RATE: 72 BPM | RESPIRATION RATE: 18 BRPM | DIASTOLIC BLOOD PRESSURE: 72 MMHG | SYSTOLIC BLOOD PRESSURE: 136 MMHG | OXYGEN SATURATION: 99 % | TEMPERATURE: 98 F

## 2021-12-10 DIAGNOSIS — N40.0 BENIGN PROSTATIC HYPERPLASIA WITHOUT LOWER URINARY TRACT SYMPTOMS: ICD-10-CM

## 2021-12-10 PROBLEM — E78.00 PURE HYPERCHOLESTEROLEMIA, UNSPECIFIED: Chronic | Status: ACTIVE | Noted: 2021-12-08

## 2021-12-10 PROBLEM — I10 ESSENTIAL (PRIMARY) HYPERTENSION: Chronic | Status: ACTIVE | Noted: 2021-12-08

## 2021-12-10 LAB
ANION GAP SERPL CALC-SCNC: 7 MMOL/L — SIGNIFICANT CHANGE UP (ref 5–17)
BUN SERPL-MCNC: 12 MG/DL — SIGNIFICANT CHANGE UP (ref 7–23)
CALCIUM SERPL-MCNC: 9.6 MG/DL — SIGNIFICANT CHANGE UP (ref 8.4–10.5)
CHLORIDE SERPL-SCNC: 105 MMOL/L — SIGNIFICANT CHANGE UP (ref 96–108)
CO2 SERPL-SCNC: 26 MMOL/L — SIGNIFICANT CHANGE UP (ref 22–31)
CREAT SERPL-MCNC: 1.02 MG/DL — SIGNIFICANT CHANGE UP (ref 0.5–1.3)
CULTURE RESULTS: NO GROWTH — SIGNIFICANT CHANGE UP
GLUCOSE SERPL-MCNC: 107 MG/DL — HIGH (ref 70–99)
HCT VFR BLD CALC: 36.3 % — LOW (ref 39–50)
HGB BLD-MCNC: 11.4 G/DL — LOW (ref 13–17)
MAGNESIUM SERPL-MCNC: 2 MG/DL — SIGNIFICANT CHANGE UP (ref 1.6–2.6)
MCHC RBC-ENTMCNC: 29.9 PG — SIGNIFICANT CHANGE UP (ref 27–34)
MCHC RBC-ENTMCNC: 31.4 GM/DL — LOW (ref 32–36)
MCV RBC AUTO: 95.3 FL — SIGNIFICANT CHANGE UP (ref 80–100)
NRBC # BLD: 0 /100 WBCS — SIGNIFICANT CHANGE UP (ref 0–0)
PHOSPHATE SERPL-MCNC: 3.5 MG/DL — SIGNIFICANT CHANGE UP (ref 2.5–4.5)
PLATELET # BLD AUTO: 261 K/UL — SIGNIFICANT CHANGE UP (ref 150–400)
POTASSIUM SERPL-MCNC: 4.2 MMOL/L — SIGNIFICANT CHANGE UP (ref 3.5–5.3)
POTASSIUM SERPL-SCNC: 4.2 MMOL/L — SIGNIFICANT CHANGE UP (ref 3.5–5.3)
RBC # BLD: 3.81 M/UL — LOW (ref 4.2–5.8)
RBC # FLD: 13.5 % — SIGNIFICANT CHANGE UP (ref 10.3–14.5)
SODIUM SERPL-SCNC: 138 MMOL/L — SIGNIFICANT CHANGE UP (ref 135–145)
SPECIMEN SOURCE: SIGNIFICANT CHANGE UP
WBC # BLD: 11.69 K/UL — HIGH (ref 3.8–10.5)
WBC # FLD AUTO: 11.69 K/UL — HIGH (ref 3.8–10.5)

## 2021-12-10 RX ADMIN — AMLODIPINE BESYLATE 5 MILLIGRAM(S): 2.5 TABLET ORAL at 05:27

## 2021-12-10 RX ADMIN — Medication 100 MILLIGRAM(S): at 06:13

## 2021-12-10 RX ADMIN — SODIUM CHLORIDE 110 MILLILITER(S): 9 INJECTION, SOLUTION INTRAVENOUS at 03:17

## 2021-12-10 NOTE — DISCHARGE NOTE PROVIDER - NSDCMRMEDTOKEN_GEN_ALL_CORE_FT
amLODIPine 5 mg oral tablet: 1 tab(s) orally once a day  atorvastatin 10 mg oral tablet: 1 tab(s) orally once a day

## 2021-12-10 NOTE — DISCHARGE NOTE NURSING/CASE MANAGEMENT/SOCIAL WORK - PATIENT PORTAL LINK FT
You can access the FollowMyHealth Patient Portal offered by [facility] by registering at the following website: [email or web address]. By joining [employer]’s FollowMyHealth portal, you will also be able to view your health information using other applications (apps) compatible with our system.

## 2021-12-10 NOTE — DISCHARGE NOTE NURSING/CASE MANAGEMENT/SOCIAL WORK - NSCORESITESY/N_GEN_A_CORE_RD
Anesthesiology Epidural Followup Note    Roxanna Kocher Chasteen  22 y.o.  female      Visit Vitals  /74 (BP 1 Location: Right arm, BP Patient Position: At rest)   Pulse 88   Temp 36.6 °C (97.9 °F)   Resp 16   Ht 5' (1.524 m)   Wt 77.1 kg (170 lb)   SpO2 100%   Breastfeeding Unknown   BMI 33.20 kg/m²       Pt is s/p vaginal delivery with continuous labor epidural. Patient had adequate pain control during labor and delivery, and she is currently without complaints. Motor and sensory function has returned to baseline in lower extremities. Back exam is clear with no signs of infection or erythema. No apparent anesthetic complications. Patient is satisfied with anesthetic care. Pain control and follow up per obstetrician.       Janeth Sandoval MD  Anesthesiologist  January 19, 2021
No

## 2021-12-10 NOTE — PROGRESS NOTE ADULT - SUBJECTIVE AND OBJECTIVE BOX
INTERVAL HPI/OVERNIGHT EVENTS:  No acute events overnight.    VITALS:    T(F): 98.8 (12-10-21 @ 00:10), Max: 98.8 (12-09-21 @ 18:18)  HR: 84 (12-10-21 @ 00:10) (75 - 89)  BP: 136/74 (12-10-21 @ 00:10) (127/59 - 156/65)  RR: 18 (12-10-21 @ 00:10) (7 - 18)  SpO2: 98% (12-10-21 @ 00:10) (98% - 100%)  Wt(kg): --    I&O's Detail    09 Dec 2021 07:01  -  10 Dec 2021 04:17  --------------------------------------------------------  IN:    Continuous Bladder Irrigation (mL): 10912 mL    Lactated Ringers: 550 mL  Total IN: 06735 mL    OUT:    Continuous Bladder Irrigation (mL): 87230 mL  Total OUT: 02323 mL    Total NET: -7780 mL          MEDICATIONS:    ANTIBIOTICS:  ceFAZolin   IVPB 1000 milliGRAM(s) IV Intermittent every 8 hours      PAIN CONTROL:  acetaminophen     Tablet .. 650 milliGRAM(s) Oral every 6 hours PRN  belladonna 16.2 mG/opium 60 mg Suppository 1 Suppository(s) Rectal every 12 hours PRN  ondansetron Injectable 4 milliGRAM(s) IV Push every 6 hours PRN       MEDS:  oxybutynin 5 milliGRAM(s) Oral every 8 hours PRN      HEME/ONC        PHYSICAL EXAM:  General: No acute distress.  Alert and Oriented  Abdominal Exam: soft, non-tender, non-distended    Exam: rose in place. CBI running. urine clear       LABS:                  
  UROLOGY POST OP NOTE (PAGER # [phone])    PROCEDURE: 12/9 laser enucleation prostate with morcellator    T(C): 36.9 (12-09-21 @ 14:54), Max: 36.9 (12-09-21 @ 14:54)  HR: 89 (12-09-21 @ 14:54) (75 - 89)  BP: 156/65 (12-09-21 @ 14:54) (127/59 - 156/65)  RR: 18 (12-09-21 @ 14:54) (7 - 18)  SpO2: 98% (12-09-21 @ 14:54) (98% - 100%)  Wt(kg): --    Subjective: pain controlled. Denies N, V, CP, SOB, fevers, chills  ON PE: awake and alert    Abdomen: nontender, nondistended    : no suprapubic/CVAT, FC intact draining light pink; CBI ON              A/P:

## 2021-12-10 NOTE — DISCHARGE NOTE PROVIDER - CARE PROVIDER_API CALL
Josemanuel Brito)  Urology  [address] [place] [address]  Phone: [phone]  Fax: [phone]  Follow Up Time:

## 2021-12-10 NOTE — DISCHARGE NOTE PROVIDER - NSDCCPCAREPLAN_GEN_ALL_CORE_FT
PRINCIPAL DISCHARGE DIAGNOSIS  Diagnosis: BPH (benign prostatic hyperplasia)  Assessment and Plan of Treatment:       SECONDARY DISCHARGE DIAGNOSES  Diagnosis: Hypertension  Assessment and Plan of Treatment:     Diagnosis: Hyperlipidemia  Assessment and Plan of Treatment:

## 2021-12-10 NOTE — DISCHARGE NOTE PROVIDER - NSDCFUSCHEDAPPT_GEN_ALL_CORE_FT
[facility] ; 12/15/2021 ; [facility] PreAdmits  [facility] ; 12/15/2021 ; NPP Rad CAT  [address] [facility] ; 12/22/2021 ; NPP HemOnc [address]

## 2021-12-10 NOTE — PROGRESS NOTE ADULT - ASSESSMENT
76M hx HTN, HLD, BPH s/p laser enucleation prostate with morcellator 12/9      Plan:  -c/w CBI; irrigate PRN  -monitor urine status  -Ancef  -reg diet  -am labs  -TOV in AM  -SCD's  -Incentive gabriella    
77yo male with PMH of BPH, HTN, HLD s/p laser enucleation of the prostate with morcellator POD #1.

## 2021-12-10 NOTE — DISCHARGE NOTE PROVIDER - NSDCFUADDINST_GEN_ALL_CORE_FT
Advance diet as tolerated, activity as tolerated. No heavy lifting or straining. If fever >100.4 or any change or worsening of symptoms please call doctor or report to ED. Make follow up appointment with Dr Melendez.

## 2021-12-10 NOTE — DISCHARGE NOTE PROVIDER - HOSPITAL COURSE
75yo male with PMH of BPH, HTN, HLD s/p laser enucleation of the prostate with morcellator. Pt is hemodynamically stable and optimized for discharge.

## 2021-12-10 NOTE — PROGRESS NOTE ADULT - PROBLEM SELECTOR PLAN 1
- s/p laser enucleation with morcellator   - stable post operatively   - TOV this morning   - abx: cefazolin   - diet: regular   - OOB/IS  - SCDs

## 2021-12-10 NOTE — DISCHARGE NOTE NURSING/CASE MANAGEMENT/SOCIAL WORK - NSDCPEFALRISK_GEN_ALL_CORE
For information on Fall & Injury Prevention, visit: [email or web address] OR  [email or web address] OR  https://www.cdc.gov/steadi/patient.html

## 2021-12-12 LAB
TESTOST BND SERPL-MCNC: 9 PG/ML
TESTOSTERONE TOTAL S: 379 NG/DL

## 2021-12-14 ENCOUNTER — NON-APPOINTMENT (OUTPATIENT)
Age: 76
End: 2021-12-14

## 2021-12-15 ENCOUNTER — RESULT REVIEW (OUTPATIENT)
Age: 76
End: 2021-12-15

## 2021-12-15 ENCOUNTER — APPOINTMENT (OUTPATIENT)
Dept: CT IMAGING | Facility: HOSPITAL | Age: 76
End: 2021-12-15

## 2021-12-15 ENCOUNTER — OUTPATIENT (OUTPATIENT)
Dept: OUTPATIENT SERVICES | Facility: HOSPITAL | Age: 76
LOS: 1 days | End: 2021-12-15
Payer: MEDICARE

## 2021-12-15 PROCEDURE — 74177 CT ABD & PELVIS W/CONTRAST: CPT | Mod: MG

## 2021-12-15 PROCEDURE — G1004: CPT

## 2021-12-15 PROCEDURE — 71260 CT THORAX DX C+: CPT | Mod: MG

## 2021-12-15 PROCEDURE — 71260 CT THORAX DX C+: CPT | Mod: 26,MG

## 2021-12-15 PROCEDURE — 74177 CT ABD & PELVIS W/CONTRAST: CPT | Mod: 26,MG

## 2021-12-16 ENCOUNTER — NON-APPOINTMENT (OUTPATIENT)
Age: 76
End: 2021-12-16

## 2021-12-16 LAB — SURGICAL PATHOLOGY STUDY: SIGNIFICANT CHANGE UP

## 2021-12-17 DIAGNOSIS — E78.5 HYPERLIPIDEMIA, UNSPECIFIED: ICD-10-CM

## 2021-12-17 DIAGNOSIS — N40.1 BENIGN PROSTATIC HYPERPLASIA WITH LOWER URINARY TRACT SYMPTOMS: ICD-10-CM

## 2021-12-17 DIAGNOSIS — N40.0 BENIGN PROSTATIC HYPERPLASIA WITHOUT LOWER URINARY TRACT SYMPTOMS: ICD-10-CM

## 2021-12-17 DIAGNOSIS — I10 ESSENTIAL (PRIMARY) HYPERTENSION: ICD-10-CM

## 2021-12-20 LAB — NIDUS STONE QN: SIGNIFICANT CHANGE UP

## 2021-12-22 ENCOUNTER — APPOINTMENT (OUTPATIENT)
Dept: UROLOGY | Facility: CLINIC | Age: 76
End: 2021-12-22

## 2021-12-22 ENCOUNTER — LABORATORY RESULT (OUTPATIENT)
Age: 76
End: 2021-12-22

## 2021-12-22 ENCOUNTER — APPOINTMENT (OUTPATIENT)
Dept: HEMATOLOGY ONCOLOGY | Facility: CLINIC | Age: 76
End: 2021-12-22
Payer: MEDICARE

## 2021-12-22 VITALS
BODY MASS INDEX: 22.34 KG/M2 | HEIGHT: 66 IN | OXYGEN SATURATION: 98 % | WEIGHT: 139 LBS | DIASTOLIC BLOOD PRESSURE: 84 MMHG | SYSTOLIC BLOOD PRESSURE: 147 MMHG | RESPIRATION RATE: 18 BRPM | TEMPERATURE: 97.7 F | HEART RATE: 87 BPM

## 2021-12-22 PROCEDURE — 99214 OFFICE O/P EST MOD 30 MIN: CPT

## 2021-12-22 RX ORDER — FINASTERIDE 5 MG/1
5 TABLET, FILM COATED ORAL
Qty: 30 | Refills: 3 | Status: DISCONTINUED | COMMUNITY
Start: 2021-11-12 | End: 2021-12-22

## 2021-12-22 RX ORDER — TAMSULOSIN HYDROCHLORIDE 0.4 MG/1
0.4 CAPSULE ORAL
Qty: 180 | Refills: 3 | Status: DISCONTINUED | COMMUNITY
Start: 2021-11-01 | End: 2021-12-22

## 2021-12-22 NOTE — HISTORY OF PRESENT ILLNESS
[de-identified] : Mr. Ferrer is a 76 year old male with history of BPH, HTN, HLD who presents to clinic today for evaluation of newly diagnosed met MARK prostate cancer Grade Grp 4 (Castor score 4+4=8) with bone mets, PSA 58 9/28/21 referred by Dr. Dubois. s/p ThuLEP on 12/9/21, path confirmed foci of prostatic adenocarcinoma (Maira's pattern 3+4 total score 7, grade group 2 ) He started Zytiga/prednisone on 12/16/21  Here for f/u. \par \par Colonoscopy in 2013, reportedly normal\par FMH- Sister with breast cancer, Half brother with prostate cancer\par \par ONC Hx\par Mr. Ferrer was being evaluated for 20 lb weight loss over 1 year with the inability to gain weight. He underwent CT scans which showed lower pole LEFT kidney 1.8 x 1.6 x 2.0 cm slightly exophytic solid enhancing mass, multiple bilateral simple cysts. markedly enlarged prostate gland with distended bladder and bladder diverticuli. PSA was 20.66 on 7/6/21. He had prostate biopsy with Dr. Melendrez  in 2011 which was negative. He was referred to Dr. Dubois on 9/28/21 for evaluation of kidney mass and elevated PSA. He notices weak stream, occasional staining to intimate stream, urinary frequency, and nocturia x 2.  He repeated PSA on 9/28/21 which was 58.40. He had MRI performed on 10/18/21 which showed 5.9 x 6.1 x 7.6 cm; volume 142 mL prostate with PIRADS 5 lesion measuring 1.6 cm Left posterolateral midgland peripheral zone. 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 Bone scans on 11/8/21 showed several osseous metastases. Dr. Tejada performed a targeted prostate biopsy with the UroNav MRI fusion on 11/11/20, pathology confirmed Adenocarcinoma of the prostate, Prognostic Grade Group 4 (Castor score 4+4=8). He is scheduled for ThuLEP with Dr. Brito on  12/9/21. \par \par 10/18/21 MRI at [place] \par -5.9 x 6.1 x 7.6 cm; volume 142 mL prostate with PIRADS 5 lesion measuring 1.6 cm Left posterolateral midgland peripheral zone. 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 \par \par 11/8/21 Bone scans  \par Findings: Focal radiotracer activity in the right clavicular head, probable right second rib, probable T6 vertebral body, and left sacrum, suspicious for osseous metastasis. Focal radiotracer activity in the L3 vertebral body, indeterminate. \par Symmetric bilateral renal radiotracer activity. No abnormal focal soft tissue activity. \par Distended urinary bladder with urinary radiotracer activity, obscuring pelvic bones, limiting evaluation. \par Impression: \par Several osseous metastases as described above. \par \par 11/11/21 \par Surgical Pathology Report  \par Final Diagnosis \par 1. Prostate, left anterior apex, biopsy \par -Benign prostate tissue. \par 2. Prostate, left anterior base, biopsy \par -Benign prostate tissue. \par 3. Prostate, right anterior apex, biopsy \par -Benign prostate tissue. \par 4. Prostate, right anterior base, biopsy \par -Benign prostate tissue. \par 5. Prostate, midline apex, biopsy \par -Benign prostate tissue. \par 6. Prostate, midline base, biopsy \par -Benign prostate tissue. \par 7. Prostate, left posterior apex, biopsy \par -Adenocarcinoma of the prostate, Prognostic Grade Group 4 (Maira score 4+4=8) involving 70% (7.5 mm in length) of 1 of 1 core(s). \par 8. Prostate, left posterior base, biopsy \par -Benign prostate tissue. \par 9. Prostate, right posterior apex, biopsy \par -Adenocarcinoma of the prostate, Prognostic Grade Group 1 (Castor score 3+3=6) involving less than 5% (less than 0.5 mm in length) of 1 of 1 core(s). \par 10. Prostate, right posterior base, biopsy \par -Benign prostate tissue. \par 11. Prostate, left lateral, biopsy \par -Benign prostate tissue. \par 12. Prostate, right lateral, biopsy \par -Benign prostate tissue. \par 13. Prostate, left mid PZPL, biopsy \par -Adenocarcinoma of the prostate, Prognostic Grade Group 4 (Maira score 4+4=8) involving 60% (5.5 mm in length) of 1 of 4 core(s). \par -Adenocarcinoma of the prostate, Prognostic Grade Group 1 (Maira score 3+3=6) involving 5% (0.5 mm in length) of 1 of 4 core(s). \par -A total of 2 of 4 cores involved by carcinoma. \par Note: Cribriform Castor pattern 4 is present. \par \par MLH1:   Intact nuclear expression\par MSH2:   Intact nuclear expression\par MSH6:   Intact nuclear expression\par PMS2:   Intact nuclear expression\par \par Interpretation:  No loss of nuclear expression of MMR proteins (MLH1, MSH2, MSH6, and PMS2).   These results show low probability of microsatellite\par instability-high (MSI-H). There is no evidence of DNA mismatch repair deficiency in the analyzed tissue, indicating there is a low probability for Barrera syndrome (a\par common cause hereditary non polyposis colorectal cancer or HNPCC).\par \par 12/9/21 s/p laser enucleation of the prostate with morcellator\par Surgical Pathology Report \par Final Diagnosis\par 1. Prostate chips:\par - Benign prostatic hyperplasia and foci of prostatic adenocarcinoma (Castor's pattern 3+4 total score 7) grade group 2 involving less than 5% of the submitted tissue\par - Maira's pattern 4 is less than 5% of the tumor\par 2. Bladder stone:\par - Calculus, gross diagnosis.\par - Specimen submitted for chemical analysis and will be reported separately\par \par 12/15/21 CT ABDOMEN AND PELVIS IC & CT CHEST IC\par Diffusely thick-walled urinary bladder which could be due to cystitis or bladder outlet obstruction. Perivesical fat infiltration suggesting cystitis.\par Enlarged prostate. Large TURP defect. No retroperitoneal or pelvic adenopathy.\par Indeterminate 1.9 cm cortical lesion in the lower pole left kidney. Could represent complicated cyst or tumor. Findings should be correlated with targeted renal sonography and/or MR.\par No intrathoracic adenopathy.\par Bone lesions T7, T11, L3 and L5 vertebral bodies and right second rib consistent with metastases.\par Staging for prostatic carcinoma at present appears to be T2, N0, M1b. [de-identified] : Patient presents to clinic today for f/u with fiance at side. Started zytiga on 12/16. Reports feeling dizzy in the morning otherwise he feels fine w/o pain. Denies SOB/CP, n/v/d, fever, urination issues. Patient c/o constipation and is taking a laxative PRN.

## 2021-12-22 NOTE — ASSESSMENT
[FreeTextEntry1] : Mr. Ferrer is a 76 year old male with history of BPH, HTN, and HLD who presents to clinic today for evaluation of newly diagnosed met [facility] [place] Grp 4 (Winters score 4+4=8) with bone mets, PSA 58 9/28/21 referred by Dr. Dubois. s/p ThuLEP on 12/9/21, path confirmed foci of prostatic adenocarcinoma (Maira's pattern 3+4 total score 7, grade group 2 ) He started Zytiga/prednisone on 12/16/21 Here for f/u. \par \par PSA : 58.40 on 9/29/21, 20.66 7/6/21 \par \par 10/18/21 MRI at [place] \par -5.9 x 6.1 x 7.6 cm; volume 142 mL prostate with PIRADS 5 lesion measuring 1.6 cm Left posterolateral midgland peripheral zone. 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 \par \par 11/8/21 Bone scans  \par Findings: Focal radiotracer activity in the right clavicular head, probable right second rib, probable T6 vertebral body, and left sacrum, suspicious for osseous metastasis. Focal radiotracer activity in the L3 vertebral body, indeterminate. \par Symmetric bilateral renal radiotracer activity. No abnormal focal soft tissue activity. \par Distended urinary bladder with urinary radiotracer activity, obscuring pelvic bones, limiting evaluation. \par Impression: \par Several osseous metastases as described above. \par \par 11/11/21 \par Surgical Pathology Report  \par Final Diagnosis \par 1. Prostate, left anterior apex, biopsy \par -Benign prostate tissue. \par 2. Prostate, left anterior base, biopsy \par -Benign prostate tissue. \par 3. Prostate, right anterior apex, biopsy \par -Benign prostate tissue. \par 4. Prostate, right anterior base, biopsy \par -Benign prostate tissue. \par 5. Prostate, midline apex, biopsy \par -Benign prostate tissue. \par 6. Prostate, midline base, biopsy \par -Benign prostate tissue. \par 7. Prostate, left posterior apex, biopsy \par -Adenocarcinoma of the prostate, Prognostic Grade Group 4 (Maira score 4+4=8) involving 70% (7.5 mm in length) of 1 of 1 core(s). \par 8. Prostate, left posterior base, biopsy \par -Benign prostate tissue. \par 9. Prostate, right posterior apex, biopsy \par -Adenocarcinoma of the prostate, Prognostic Grade Group 1 (Winters score 3+3=6) involving less than 5% (less than 0.5 mm in length) of 1 of 1 core(s). \par 10. Prostate, right posterior base, biopsy \par -Benign prostate tissue. \par 11. Prostate, left lateral, biopsy \par -Benign prostate tissue. \par 12. Prostate, right lateral, biopsy \par -Benign prostate tissue. \par 13. Prostate, left mid PZPL, biopsy \par -Adenocarcinoma of the prostate, Prognostic Grade Group 4 (Maira score 4+4=8) involving 60% (5.5 mm in length) of 1 of 4 core(s). \par -Adenocarcinoma of the prostate, Prognostic Grade Group 1 (Winters score 3+3=6) involving 5% (0.5 mm in length) of 1 of 4 core(s). \par -A total of 2 of 4 cores involved by carcinoma. \par Note: Cribriform Winters pattern 4 is present. \par \par MLH1:   Intact nuclear expression\par MSH2:   Intact nuclear expression\par MSH6:   Intact nuclear expression\par PMS2:   Intact nuclear expression\par \par Interpretation:  No loss of nuclear expression of MMR proteins (MLH1, MSH2, MSH6, and PMS2).   These results show low probability of microsatellite instability-high (MSI-H). There is no evidence of DNA mismatch repair deficiency in the analyzed tissue, indicating there is a low probability for Barrera syndrome (a common cause hereditary non polyposis colorectal cancer or HNPCC).\par \par 12/9/21 s/p laser enucleation of the prostate with morcellator\par Surgical Pathology Report \par Final Diagnosis\par 1. Prostate chips:\par - Benign prostatic hyperplasia and foci of prostatic adenocarcinoma (Winters's pattern 3+4 total score 7) grade group 2 involving less than 5% of the submitted tissue\par - Maira's pattern 4 is less than 5% of the tumor\par 2. Bladder stone:\par - Calculus, gross diagnosis.\par - Specimen submitted for chemical analysis and will be reported separately\par \par 12/15/21 CT ABDOMEN AND PELVIS IC & CT CHEST IC\par Diffusely thick-walled urinary bladder which could be due to cystitis or bladder outlet obstruction. Perivesical fat infiltration suggesting cystitis.\par Enlarged prostate. Large TURP defect. No retroperitoneal or pelvic adenopathy.\par Indeterminate 1.9 cm cortical lesion in the lower pole left kidney. Could represent complicated cyst or tumor. 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 Indeterminate 1.9 cm cortical lesion in the lower pole left kidney.Bone lesions T7, T11, L3 and L5 vertebral bodies and right second rib\par -PSMA scan with pending schedule\par -Received covid vaccine x 2(August/2021), received booster shot \par -Genetic counseling referral for BRCA testing\par -FO NGS with pending results \par -High-volume metastatic disease is presence of visceral metastases of 4 or more bone metastases, at least one outside the vertebral bodies and pelvis.\par -If low risk will give zytiga/pred+ADT. Started zytiga/pred on 12/16. tolerating tx. \par -f/u with Dr. Dubois/Daryl for ADT\par \par -RTC 3 weeks\par \par Trial data\par The PEACE-1 trial had a 2 × 2 factorial design. Patients with de aishwarya metastatic castration-sensitive prostate cancer (n = 1,173) were randomly assigned 1:1:1:1 to receive the standard of care (n = 296); the standard of care plus abiraterone (n = 292); the standard of care plus radiotherapy (n = 293); or the standard of care plus abiraterone plus radiotherapy (n = 292). All patients had continuous androgen-deprivation therapy. Standard treatments were defined as androgen-deprivation therapy with or without docetaxel. Stratification factors included Eastern Cooperative Oncology Group performance status (0 or 1), metastatic sites (lymph node vs bone vs visceral), type of castration (orchiectomy vs androgen-deprivation therapy), and docetaxel (yes or no).\par \par Disease and demographic factors were well balanced at baseline. At baseline, in the population given androgen-deprivation therapy plus docetaxel (accounting for 710 patients), the median age was 66, and about 78% had a Winters score > 8. About 80% had bone-alone metastasis; 8% had lymph-node–alone metastasis; and about 12% had visceral metastasis. About 36% had low-volume disease, and 64% had high-volume disease.\par \par Co-primary endpoints were radiographic ­progression-free survival and overall survival. Radiographic progression-free survival was significantly improved with the addition of abiraterone acetate plus prednisone to androgen-deprivation therapy plus docetaxel (P < .0001). At a median follow-up of 4.5 years, in patients treated with abiraterone acetate plus prednisone and the standard of care, 139 events were reported; at a median follow-up of 2 years in the standard-of-care-alone arm, there were 211 events.\par \par Looking at radiographic progression-free survival in the population given androgen-deprivation therapy plus docetaxel (with or without radiotherapy), according to disease burden, the addition of abiraterone acetate plus prednisone benefited both groups, but the benefit tended to be greater in the group with high-volume disease. Median radiographic progression-free survival was 4.1 years when abiraterone acetate plus prednisone was added vs 1.6 years for those with high-volume disease who received the standard of care (< .0001), compared with not reached vs a median of 2.7 years in those with low-volume disease (P = .006).\par \par Overall Survival\par \par Follow-up for overall survival was 4.4 years in the overall population; 5.7 years in the control arm of androgen-deprivation therapy alone with or without radiotherapy; and 3.8 years in the control arm of androgen-deprivation therapy plus docetaxel with or without radiotherapy. Overall survival was improved by 25% with the addition of abiraterone acetate plus prednisone to androgen-deprivation therapy plus docetaxel vs the standard of care alone: not reached vs a median of 4.4 years, respectively (P = .017).\par \par A subgroup analysis of overall survival showed a benefit for abiraterone acetate plus prednisone added to androgen-deprivation therapy plus docetaxel in all subgroups, including those stratified by receipt of radiotherapy, performance status, type of castration, and metastatic burden. In patients with high-volume disease, overall survival was improved by 28% with the addition of abiraterone acetate plus prednisone to androgen-deprivation therapy plus docetaxel: median of 5.1 years vs 3.5 years with the standard of care (P = .019). In patients with low-volume disease, overall survival is immature at this point, and medians were not reached in either group.\par \par The overall survival benefit translates to a median lifetime gain of more than 1.5 years for men with high-volume metastases.\par

## 2021-12-22 NOTE — REVIEW OF SYSTEMS
[Hoarseness] : hoarseness [Anxiety] : anxiety [Negative] : Allergic/Immunologic [Constipation] : constipation [Insomnia] : insomnia [Dysphagia] : no dysphagia [Loss of Hearing] : no loss of hearing [Nosebleeds] : no nosebleeds [Odynophagia] : no odynophagia [Mucosal Pain] : no mucosal pain [FreeTextEntry8] : pressure while peeing s/p surgery

## 2021-12-27 LAB
ALBUMIN SERPL ELPH-MCNC: 3.4 G/DL
ALP BLD-CCNC: 146 U/L
ALT SERPL-CCNC: 24 U/L
ANION GAP SERPL CALC-SCNC: 6 MMOL/L
AST SERPL-CCNC: 23 U/L
BILIRUB SERPL-MCNC: 0.7 MG/DL
BUN SERPL-MCNC: 19 MG/DL
CALCIUM SERPL-MCNC: 9.9 MG/DL
CHLORIDE SERPL-SCNC: 108 MMOL/L
CO2 SERPL-SCNC: 30 MMOL/L
CREAT SERPL-MCNC: 1.3 MG/DL
GLUCOSE SERPL-MCNC: 82 MG/DL
POTASSIUM SERPL-SCNC: 4.8 MMOL/L
PROT SERPL-MCNC: 7.3 G/DL
PSA FREE FLD-MCNC: 9 %
PSA FREE SERPL-MCNC: 7.13 NG/ML
PSA SERPL-MCNC: 78.3 NG/ML
SODIUM SERPL-SCNC: 144 MMOL/L

## 2021-12-29 ENCOUNTER — APPOINTMENT (OUTPATIENT)
Dept: UROLOGY | Facility: CLINIC | Age: 76
End: 2021-12-29
Payer: MEDICARE

## 2021-12-29 VITALS — DIASTOLIC BLOOD PRESSURE: 88 MMHG | HEART RATE: 81 BPM | TEMPERATURE: 98.8 F | SYSTOLIC BLOOD PRESSURE: 161 MMHG

## 2021-12-29 LAB
BILIRUB UR QL STRIP: NORMAL
CLARITY UR: CLEAR
COLLECTION METHOD: NORMAL
GLUCOSE UR-MCNC: NORMAL
HCG UR QL: 0.2 EU/DL
HGB UR QL STRIP.AUTO: NORMAL
KETONES UR-MCNC: NORMAL
LEUKOCYTE ESTERASE UR QL STRIP: NORMAL
NITRITE UR QL STRIP: NORMAL
PH UR STRIP: 5
PROT UR STRIP-MCNC: NORMAL
SP GR UR STRIP: 1.02
TESTOST FREE SERPL-MCNC: 0.2 PG/ML
TESTOST SERPL-MCNC: <2.5 NG/DL

## 2021-12-29 PROCEDURE — 96402 CHEMO HORMON ANTINEOPL SQ/IM: CPT

## 2021-12-29 PROCEDURE — 81003 URINALYSIS AUTO W/O SCOPE: CPT | Mod: QW

## 2021-12-29 PROCEDURE — 99024 POSTOP FOLLOW-UP VISIT: CPT

## 2021-12-29 PROCEDURE — 51798 US URINE CAPACITY MEASURE: CPT

## 2021-12-29 RX ORDER — LEUPROLIDE ACETATE 22.5 MG
22.5 KIT INTRAMUSCULAR
Qty: 1 | Refills: 0 | Status: COMPLETED | OUTPATIENT
Start: 2021-12-29

## 2021-12-29 RX ADMIN — LEUPROLIDE ACETATE 1 MG: KIT at 00:00

## 2021-12-29 NOTE — ASSESSMENT
[FreeTextEntry1] : 76 year old man with metastatic prostate cancer to bone with multiple sites, 140 cc prostate with chronic LUTS and recent urinary retention s/p ThuLEP 12/9/21. Passed void trial POD 1. Pathology with 91 grams of prostate tissue with [place] 3+4 prostate cancer in less than 5% of tissue. He started abiraterone/prednisone and received first lupron injection today. Doing well with strong stream, complete emptying, no leakage and no hematuria.\par  - F/U in 3 months for post-op appointment. Will give next lupron injection at that appointment as well.\par  - F/U with Dr. Reed for continued management of metastatic prostate cancer

## 2021-12-29 NOTE — HISTORY OF PRESENT ILLNESS
[FreeTextEntry1] : 11/23/21: 76 year old man with BPH, history of urinary retention and newly diagnosed metastatic prostate cancer.\par \par Regarding prostate cancer, he had MRI at [place] on 10/18/2021. 5.9 x 6.1 x 7.6 cm; volume 142 mL prostate with PIRADS 5 lesion measuring 1.6 cm Left posterolateral midgland peripheral zone. 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 The standard biopsy detected GG4 cancer which did overlap with the targeted biopsy. 2/12 cores. location(s). LPA GG4, RPA GG1\par \par \par Bone scan positive at multiple sites. He is scheduled to see Dr. Reed to discuss treatment for metastatic prostate cancer. He has not started ADT yet.\par \par He has long history of LUTS as well. He was taking saw palmetto for a long time. His symptoms are primarily weak stream, straining, urinary frequency, nocturia and need to defecate when urinating. He has elevated PVRs, up to 600 cc in the office. He went into urinary retention after prostate biopsy, but is now voiding. He started flomax and finasteride recently and has noticed improved urinary stream with those medications. No fevers, chills, dysuria, hematuria.\par \par 12/9/21: ThuLEP, procedure uncomplicated. Passed void trial POD 1.\par \par pathology: 91 grams, [place] 3+4 prostate cancer involving less than 5% of the tissue\par \par 12/29/21: Doing well. Voiding with strong stream, complete emptying, decreased frequency, urgency. No leakage. No hematuria. Started abiraterone/prednisone with Dr. Reed.\par \par IPSS 4, QOL 1, PVR 32\par \par PSA 7/6/21: 20.66 ng/ml; 9/28/21--58.4; 12/8/21--220; 12/22/21--78.3\par \par

## 2021-12-30 PROCEDURE — C9399: CPT

## 2021-12-30 PROCEDURE — 84100 ASSAY OF PHOSPHORUS: CPT

## 2021-12-30 PROCEDURE — 85027 COMPLETE CBC AUTOMATED: CPT

## 2021-12-30 PROCEDURE — 82365 CALCULUS SPECTROSCOPY: CPT

## 2021-12-30 PROCEDURE — 86850 RBC ANTIBODY SCREEN: CPT

## 2021-12-30 PROCEDURE — 87086 URINE CULTURE/COLONY COUNT: CPT

## 2021-12-30 PROCEDURE — C1889: CPT

## 2021-12-30 PROCEDURE — 83735 ASSAY OF MAGNESIUM: CPT

## 2021-12-30 PROCEDURE — 36415 COLL VENOUS BLD VENIPUNCTURE: CPT

## 2021-12-30 PROCEDURE — 88300 SURGICAL PATH GROSS: CPT

## 2021-12-30 PROCEDURE — 86900 BLOOD TYPING SEROLOGIC ABO: CPT

## 2021-12-30 PROCEDURE — 88305 TISSUE EXAM BY PATHOLOGIST: CPT

## 2021-12-30 PROCEDURE — 80048 BASIC METABOLIC PNL TOTAL CA: CPT

## 2021-12-30 PROCEDURE — 52601 PROSTATECTOMY (TURP): CPT

## 2021-12-30 PROCEDURE — C1782: CPT

## 2021-12-30 PROCEDURE — 86901 BLOOD TYPING SEROLOGIC RH(D): CPT

## 2022-01-12 ENCOUNTER — LABORATORY RESULT (OUTPATIENT)
Age: 77
End: 2022-01-12

## 2022-01-12 ENCOUNTER — APPOINTMENT (OUTPATIENT)
Dept: HEMATOLOGY ONCOLOGY | Facility: CLINIC | Age: 77
End: 2022-01-12
Payer: MEDICARE

## 2022-01-12 VITALS
TEMPERATURE: 96.6 F | HEIGHT: 66 IN | OXYGEN SATURATION: 98 % | BODY MASS INDEX: 22.82 KG/M2 | WEIGHT: 142 LBS | SYSTOLIC BLOOD PRESSURE: 159 MMHG | HEART RATE: 85 BPM | DIASTOLIC BLOOD PRESSURE: 83 MMHG | RESPIRATION RATE: 18 BRPM

## 2022-01-12 LAB
ALBUMIN SERPL ELPH-MCNC: 3.2 G/DL
ALP BLD-CCNC: 146 U/L
ALT SERPL-CCNC: 23 U/L
ANION GAP SERPL CALC-SCNC: 6 MMOL/L
AST SERPL-CCNC: 27 U/L
BILIRUB SERPL-MCNC: 0.8 MG/DL
BUN SERPL-MCNC: 20 MG/DL
CALCIUM SERPL-MCNC: 10.1 MG/DL
CHLORIDE SERPL-SCNC: 107 MMOL/L
CO2 SERPL-SCNC: 31 MMOL/L
CREAT SERPL-MCNC: 1.1 MG/DL
GLUCOSE SERPL-MCNC: 97 MG/DL
POTASSIUM SERPL-SCNC: 4.1 MMOL/L
PROT SERPL-MCNC: 6.8 G/DL
SODIUM SERPL-SCNC: 144 MMOL/L

## 2022-01-12 PROCEDURE — 99214 OFFICE O/P EST MOD 30 MIN: CPT

## 2022-01-12 NOTE — HISTORY OF PRESENT ILLNESS
[de-identified] : Mr. Ferrer is a 76 year old male with history of BPH, HTN, HLD who presents to clinic today for evaluation of newly diagnosed met MARK prostate cancer Grade Grp 4 (Whitlash score 4+4=8) with bone mets, PSA 58 9/28/21 referred by Dr. Dubois. s/p ThuLEP on 12/9/21, path confirmed foci of prostatic adenocarcinoma (Maira's pattern 3+4 total score 7, grade group 2 ) He started Zytiga/prednisone on 12/16/21  Here for f/u. \par \par Colonoscopy in 2013, reportedly normal\par FMH- Sister with breast cancer, Half brother with prostate cancer\par \par ONC Hx\par Mr. Ferrer was being evaluated for 20 lb weight loss over 1 year with the inability to gain weight. He underwent CT scans which showed lower pole LEFT kidney 1.8 x 1.6 x 2.0 cm slightly exophytic solid enhancing mass, multiple bilateral simple cysts. markedly enlarged prostate gland with distended bladder and bladder diverticuli. PSA was 20.66 on 7/6/21. He had prostate biopsy with Dr. Melendrez  in 2011 which was negative. He was referred to Dr. Dubois on 9/28/21 for evaluation of kidney mass and elevated PSA. He notices weak stream, occasional staining to intimate stream, urinary frequency, and nocturia x 2.  He repeated PSA on 9/28/21 which was 58.40. He had MRI performed on 10/18/21 which showed 5.9 x 6.1 x 7.6 cm; volume 142 mL prostate with PIRADS 5 lesion measuring 1.6 cm Left posterolateral midgland peripheral zone. 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 Bone scans on 11/8/21 showed several osseous metastases. Dr. Tejada performed a targeted prostate biopsy with the UroNav MRI fusion on 11/11/20, pathology confirmed Adenocarcinoma of the prostate, Prognostic Grade Group 4 (Whitlash score 4+4=8). He is scheduled for ThuLEP with Dr. Brito on  12/9/21. \par \par 10/18/21 MRI at [place] \par -5.9 x 6.1 x 7.6 cm; volume 142 mL prostate with PIRADS 5 lesion measuring 1.6 cm Left posterolateral midgland peripheral zone. 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 \par \par 11/8/21 Bone scans  \par Findings: Focal radiotracer activity in the right clavicular head, probable right second rib, probable T6 vertebral body, and left sacrum, suspicious for osseous metastasis. Focal radiotracer activity in the L3 vertebral body, indeterminate. \par Symmetric bilateral renal radiotracer activity. No abnormal focal soft tissue activity. \par Distended urinary bladder with urinary radiotracer activity, obscuring pelvic bones, limiting evaluation. \par Impression: \par Several osseous metastases as described above. \par \par 11/11/21 \par Surgical Pathology Report  \par Final Diagnosis \par 1. Prostate, left anterior apex, biopsy \par -Benign prostate tissue. \par 2. Prostate, left anterior base, biopsy \par -Benign prostate tissue. \par 3. Prostate, right anterior apex, biopsy \par -Benign prostate tissue. \par 4. Prostate, right anterior base, biopsy \par -Benign prostate tissue. \par 5. Prostate, midline apex, biopsy \par -Benign prostate tissue. \par 6. Prostate, midline base, biopsy \par -Benign prostate tissue. \par 7. Prostate, left posterior apex, biopsy \par -Adenocarcinoma of the prostate, Prognostic Grade Group 4 (Maira score 4+4=8) involving 70% (7.5 mm in length) of 1 of 1 core(s). \par 8. Prostate, left posterior base, biopsy \par -Benign prostate tissue. \par 9. Prostate, right posterior apex, biopsy \par -Adenocarcinoma of the prostate, Prognostic Grade Group 1 (Whitlash score 3+3=6) involving less than 5% (less than 0.5 mm in length) of 1 of 1 core(s). \par 10. Prostate, right posterior base, biopsy \par -Benign prostate tissue. \par 11. Prostate, left lateral, biopsy \par -Benign prostate tissue. \par 12. Prostate, right lateral, biopsy \par -Benign prostate tissue. \par 13. Prostate, left mid PZPL, biopsy \par -Adenocarcinoma of the prostate, Prognostic Grade Group 4 (Maira score 4+4=8) involving 60% (5.5 mm in length) of 1 of 4 core(s). \par -Adenocarcinoma of the prostate, Prognostic Grade Group 1 (Maira score 3+3=6) involving 5% (0.5 mm in length) of 1 of 4 core(s). \par -A total of 2 of 4 cores involved by carcinoma. \par Note: Cribriform Whitlash pattern 4 is present. \par \par MLH1:   Intact nuclear expression\par MSH2:   Intact nuclear expression\par MSH6:   Intact nuclear expression\par PMS2:   Intact nuclear expression\par \par Interpretation:  No loss of nuclear expression of MMR proteins (MLH1, MSH2, MSH6, and PMS2).   These results show low probability of microsatellite\par instability-high (MSI-H). There is no evidence of DNA mismatch repair deficiency in the analyzed tissue, indicating there is a low probability for Barrera syndrome (a\par common cause hereditary non polyposis colorectal cancer or HNPCC).\par \par 12/9/21 s/p laser enucleation of the prostate with morcellator\par Surgical Pathology Report \par Final Diagnosis\par 1. Prostate chips:\par - Benign prostatic hyperplasia and foci of prostatic adenocarcinoma (Whitlash's pattern 3+4 total score 7) grade group 2 involving less than 5% of the submitted tissue\par - Maira's pattern 4 is less than 5% of the tumor\par 2. Bladder stone:\par - Calculus, gross diagnosis.\par - Specimen submitted for chemical analysis and will be reported separately\par \par 12/15/21 CT ABDOMEN AND PELVIS IC & CT CHEST IC\par Diffusely thick-walled urinary bladder which could be due to cystitis or bladder outlet obstruction. Perivesical fat infiltration suggesting cystitis.\par Enlarged prostate. Large TURP defect. No retroperitoneal or pelvic adenopathy.\par Indeterminate 1.9 cm cortical lesion in the lower pole left kidney. Could represent complicated cyst or tumor. Findings should be correlated with targeted renal sonography and/or MR.\par No intrathoracic adenopathy.\par Bone lesions T7, T11, L3 and L5 vertebral bodies and right second rib consistent with metastases.\par Staging for prostatic carcinoma at present appears to be T2, N0, M1b.\par \par 1/7/22 FO NGS\par CDK12 K418fs*14 \par \par Microsatellite status - MS-Stable\par Tumor Mutational [place] - 3 Muts/Mb\par Genomic Findings\par For a complete list of the genes assayed, please refer to the Appendix.\par CDK12 K418fs*14\par FGFR3 amplification\par FGFR4 amplification - equivocal†\par MDM2 amplification\par MDM4 amplification - equivocal†\par PIK3CA amplification\par CIC loss\par IGF1R amplification\par IKBKE amplification - equivocal†\par IRS2 amplification - equivocal†\par MYCL1 amplification - equivocal†\par [id number] amplification - equivocal†\par  [de-identified] : Patient presents to clinic today for f/u with fiance at side. Patient states he is feeling well. Denies SOB/CP, n/v/c/d, fever, and urination issues. Patient gained 2 pounds in 3 weeks and appetite is good.

## 2022-01-12 NOTE — ASSESSMENT
[FreeTextEntry1] : Mr. 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 Dubois. s/p ThuLEP on 12/9/21, path confirmed foci of prostatic adenocarcinoma (Maira's pattern 3+4 total score 7, grade group 2 ) He started Zytiga/prednisone on 12/16/21 Recieved lupron on 12/29/21 Here for f/u. \par \par PSA : 78.3 (12/23/21), 58.40 on 9/29/21, 20.66 7/6/21 \par \par 10/18/21 MRI at [place] \par -5.9 x 6.1 x 7.6 cm; volume 142 mL prostate with PIRADS 5 lesion measuring 1.6 cm Left posterolateral midgland peripheral zone. 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 \par \par 11/8/21 Bone scans  \par Findings: Focal radiotracer activity in the right clavicular head, probable right second rib, probable T6 vertebral body, and left sacrum, suspicious for osseous metastasis. Focal radiotracer activity in the L3 vertebral body, indeterminate. \par Symmetric bilateral renal radiotracer activity. No abnormal focal soft tissue activity. \par Distended urinary bladder with urinary radiotracer activity, obscuring pelvic bones, limiting evaluation. \par Impression: \par Several osseous metastases as described above. \par \par 11/11/21 \par Surgical Pathology Report  \par Final Diagnosis \par 1. Prostate, left anterior apex, biopsy \par -Benign prostate tissue. \par 2. Prostate, left anterior base, biopsy \par -Benign prostate tissue. \par 3. Prostate, right anterior apex, biopsy \par -Benign prostate tissue. \par 4. Prostate, right anterior base, biopsy \par -Benign prostate tissue. \par 5. Prostate, midline apex, biopsy \par -Benign prostate tissue. \par 6. Prostate, midline base, biopsy \par -Benign prostate tissue. \par 7. Prostate, left posterior apex, biopsy \par -Adenocarcinoma of the prostate, Prognostic Grade Group 4 ([place] Roy score 4+4=8) involving 70% (7.5 mm in length) of 1 of 1 core(s). \par 8. Prostate, left posterior base, biopsy \par -Benign prostate tissue. \par 9. Prostate, right posterior apex, biopsy \par -Adenocarcinoma of the prostate, Prognostic Grade Group 1 (Maira score 3+3=6) involving less than 5% (less than 0.5 mm in length) of 1 of 1 core(s). \par 10. Prostate, right posterior base, biopsy \par -Benign prostate tissue. \par 11. Prostate, left lateral, biopsy \par -Benign prostate tissue. \par 12. Prostate, right lateral, biopsy \par -Benign prostate tissue. \par 13. Prostate, left mid PZPL, biopsy \par -Adenocarcinoma of the prostate, Prognostic Grade Group 4 ([place] score 4+4=8) involving 60% (5.5 mm in length) of 1 of 4 core(s). \par -Adenocarcinoma of the prostate, Prognostic Grade Group 1 ([place] score 3+3=6) involving 5% (0.5 mm in length) of 1 of 4 core(s). \par -A total of 2 of 4 cores involved by carcinoma. \par Note: Cribriform Maira pattern 4 is present. \par \par MLH1:   Intact nuclear expression\par MSH2:   Intact nuclear expression\par MSH6:   Intact nuclear expression\par PMS2:   Intact nuclear expression\par \par Interpretation:  No loss of nuclear expression of MMR proteins (MLH1, MSH2, MSH6, and PMS2).   These results show low probability of microsatellite instability-high (MSI-H). There is no evidence of DNA mismatch repair deficiency in the analyzed tissue, indicating there is a low probability for Barrera syndrome (a common cause hereditary non polyposis colorectal cancer or HNPCC).\par \par 12/9/21 s/p laser enucleation of the prostate with morcellator\par Surgical Pathology Report \par Final Diagnosis\par 1. Prostate chips:\par - Benign prostatic hyperplasia and foci of prostatic adenocarcinoma (Le Roy's pattern 3+4 total score 7) grade group 2 involving less than 5% of the submitted tissue\par - Le Roy's pattern 4 is less than 5% of the tumor\par 2. Bladder stone:\par - Calculus, gross diagnosis.\par - Specimen submitted for chemical analysis and will be reported separately\par \par 12/15/21 CT ABDOMEN AND PELVIS IC & CT CHEST IC\par Diffusely thick-walled urinary bladder which could be due to cystitis or bladder outlet obstruction. Perivesical fat infiltration suggesting cystitis.\par Enlarged prostate. Large TURP defect. No retroperitoneal or pelvic adenopathy.\par Indeterminate 1.9 cm cortical lesion in the lower pole left kidney. Could represent complicated cyst or tumor. Findings should be correlated with targeted renal sonography and/or MR.\par No intrathoracic adenopathy.\par Bone lesions T7, T11, L3 and L5 vertebral bodies and right second rib consistent with metastases.\par Staging for prostatic carcinoma at present appears to be T2, N0, M1b.\par \par 1/7/22 FO NGS\par CDK12 K418fs*14 \par Microsatellite status - MS-Stable\par Tumor Mutational [place] - 3 Muts/Mb\par Genomic Findings\par For a complete list of the genes assayed, please refer to the Appendix.\par CDK12 K418fs*14\par FGFR3 amplification\par FGFR4 amplification - equivocal†\par MDM2 amplification\par MDM4 amplification - equivocal†\par PIK3CA amplification\par CIC loss\par IGF1R amplification\par IKBKE amplification - equivocal†\par IRS2 amplification - equivocal†\par MYCL1 amplification - equivocal†\par CRC3Y6T amplification - equivocal†\par \par Plan: MARK 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 Indeterminate 1.9 cm cortical lesion in the lower pole left kidney.Bone lesions T7, T11, L3 and L5 vertebral bodies and right second rib\par -PSA trending down 78.3(12/22), 220(12/8/21) \par -PSMA scan with pending schedule\par -Received covid vaccine x 2(August/2021),  will get booster shot in Feb 2022. \par -Genetic counseling referral for BRCA testing\par -FO NGS with CDK12 K418fs*14 (Lynparza® (Olaparib)\par - Started zytiga/pred on 12/16. tolerating tx. \par -f/u with Dr. Dubois/Daryl for ADT, Received Lupron on 12/29/21 every 3 months\par -Start Alvarado/Vit D\par \par -RTC 4 weeks\par \par Trial data\par The PEACE-1 trial had a 2 × 2 factorial design. Patients with de aishwarya metastatic castration-sensitive prostate cancer (n = 1,173) were randomly assigned 1:1:1:1 to receive the standard of care (n = 296); the standard of care plus abiraterone (n = 292); the standard of care plus radiotherapy (n = 293); or the standard of care plus abiraterone plus radiotherapy (n = 292). All patients had continuous androgen-deprivation therapy. Standard treatments were defined as androgen-deprivation therapy with or without docetaxel. Stratification factors included Eastern Cooperative Oncology Group performance status (0 or 1), metastatic sites (lymph node vs bone vs visceral), type of castration (orchiectomy vs androgen-deprivation therapy), and docetaxel (yes or no).\par \par Disease and demographic factors were well balanced at baseline. At baseline, in the population given androgen-deprivation therapy plus docetaxel (accounting for 710 patients), the median age was 66, and about 78% had a Maira score > 8. About 80% had bone-alone metastasis; 8% had lymph-node–alone metastasis; and about 12% had visceral metastasis. About 36% had low-volume disease, and 64% had high-volume disease.\par \par Co-primary endpoints were radiographic ­progression-free survival and overall survival. Radiographic progression-free survival was significantly improved with the addition of abiraterone acetate plus prednisone to androgen-deprivation therapy plus docetaxel (P < .0001). At a median follow-up of 4.5 years, in patients treated with abiraterone acetate plus prednisone and the standard of care, 139 events were reported; at a median follow-up of 2 years in the standard-of-care-alone arm, there were 211 events.\par \par Looking at radiographic progression-free survival in the population given androgen-deprivation therapy plus docetaxel (with or without radiotherapy), according to disease burden, the addition of abiraterone acetate plus prednisone benefited both groups, but the benefit tended to be greater in the group with high-volume disease. Median radiographic progression-free survival was 4.1 years when abiraterone acetate plus prednisone was added vs 1.6 years for those with high-volume disease who received the standard of care (< .0001), compared with not reached vs a median of 2.7 years in those with low-volume disease (P = .006).\par \par Overall Survival\par \par Follow-up for overall survival was 4.4 years in the overall population; 5.7 years in the control arm of androgen-deprivation therapy alone with or without radiotherapy; and 3.8 years in the control arm of androgen-deprivation therapy plus docetaxel with or without radiotherapy. Overall survival was improved by 25% with the addition of abiraterone acetate plus prednisone to androgen-deprivation therapy plus docetaxel vs the standard of care alone: not reached vs a median of 4.4 years, respectively (P = .017).\par \par A subgroup analysis of overall survival showed a benefit for abiraterone acetate plus prednisone added to androgen-deprivation therapy plus docetaxel in all subgroups, including those stratified by receipt of radiotherapy, performance status, type of castration, and metastatic burden. In patients with high-volume disease, overall survival was improved by 28% with the addition of abiraterone acetate plus prednisone to androgen-deprivation therapy plus docetaxel: median of 5.1 years vs 3.5 years with the standard of care (P = .019). In patients with low-volume disease, overall survival is immature at this point, and medians were not reached in either group.\par \par The overall survival benefit translates to a median lifetime gain of more than 1.5 years for men with high-volume metastases.\par

## 2022-01-12 NOTE — REVIEW OF SYSTEMS
[Hoarseness] : hoarseness [Insomnia] : insomnia [Negative] : Allergic/Immunologic [Recent Change In Weight] : ~T recent weight change [Dysphagia] : no dysphagia [Loss of Hearing] : no loss of hearing [Nosebleeds] : no nosebleeds [Odynophagia] : no odynophagia [Mucosal Pain] : no mucosal pain [Constipation] : no constipation [Anxiety] : no anxiety [FreeTextEntry2] : 2 lb weight gain in 3 weeks

## 2022-01-18 LAB
PSA FREE FLD-MCNC: 13 %
PSA FREE SERPL-MCNC: 1.12 NG/ML
PSA SERPL-MCNC: 8.95 NG/ML
TESTOST FREE SERPL-MCNC: 0.1 PG/ML
TESTOST SERPL-MCNC: <2.5 NG/DL

## 2022-01-20 ENCOUNTER — NON-APPOINTMENT (OUTPATIENT)
Age: 77
End: 2022-01-20

## 2022-01-26 ENCOUNTER — OUTPATIENT (OUTPATIENT)
Dept: OUTPATIENT SERVICES | Facility: HOSPITAL | Age: 77
LOS: 1 days | End: 2022-01-26
Payer: MEDICARE

## 2022-01-26 LAB — GLUCOSE BLDC GLUCOMTR-MCNC: 75 MG/DL — SIGNIFICANT CHANGE UP (ref 70–99)

## 2022-01-26 PROCEDURE — 82962 GLUCOSE BLOOD TEST: CPT

## 2022-01-26 PROCEDURE — 78816 PET IMAGE W/CT FULL BODY: CPT

## 2022-01-26 PROCEDURE — A9595: CPT

## 2022-01-26 PROCEDURE — 78816 PET IMAGE W/CT FULL BODY: CPT | Mod: 26,MH

## 2022-02-09 ENCOUNTER — APPOINTMENT (OUTPATIENT)
Dept: HEMATOLOGY ONCOLOGY | Facility: CLINIC | Age: 77
End: 2022-02-09
Payer: MEDICARE

## 2022-02-09 ENCOUNTER — LABORATORY RESULT (OUTPATIENT)
Age: 77
End: 2022-02-09

## 2022-02-09 VITALS
DIASTOLIC BLOOD PRESSURE: 88 MMHG | RESPIRATION RATE: 18 BRPM | TEMPERATURE: 97.8 F | HEIGHT: 66 IN | HEART RATE: 90 BPM | SYSTOLIC BLOOD PRESSURE: 156 MMHG | WEIGHT: 149 LBS | BODY MASS INDEX: 23.95 KG/M2 | OXYGEN SATURATION: 99 %

## 2022-02-09 LAB
ALBUMIN SERPL ELPH-MCNC: 3.4 G/DL
ALP BLD-CCNC: 136 U/L
ALT SERPL-CCNC: 61 U/L
ANION GAP SERPL CALC-SCNC: 6 MMOL/L
AST SERPL-CCNC: 35 U/L
BILIRUB SERPL-MCNC: 1 MG/DL
BUN SERPL-MCNC: 23 MG/DL
CALCIUM SERPL-MCNC: 10.1 MG/DL
CHLORIDE SERPL-SCNC: 105 MMOL/L
CO2 SERPL-SCNC: 32 MMOL/L
CREAT SERPL-MCNC: 1.2 MG/DL
GLUCOSE SERPL-MCNC: 104 MG/DL
POTASSIUM SERPL-SCNC: 4.6 MMOL/L
PROT SERPL-MCNC: 7.3 G/DL
SODIUM SERPL-SCNC: 143 MMOL/L

## 2022-02-09 PROCEDURE — 99214 OFFICE O/P EST MOD 30 MIN: CPT

## 2022-02-09 NOTE — HISTORY OF PRESENT ILLNESS
[de-identified] : Mr. 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 Dubois. s/p ThuLEP on 12/9/21, path confirmed foci of prostatic adenocarcinoma (Maira's pattern 3+4 total score 7, grade group 2 ) He started Zytiga/prednisone on 12/16/21  Here for f/u after PSMA scan on 1/26/22 showing multiple DCFPyL-avid osseous metastases.\par \par Colonoscopy in 2013, reportedly normal\par FMH- Sister with breast cancer, Half brother with prostate cancer\par \par ONC Hx\par Mr. Ferrer was being evaluated for 20 lb weight loss over 1 year with the inability to gain weight. He underwent CT scans which showed lower pole LEFT kidney 1.8 x 1.6 x 2.0 cm slightly exophytic solid enhancing mass, multiple bilateral simple cysts. markedly enlarged prostate gland with distended bladder and bladder diverticuli. PSA was 20.66 on 7/6/21. He had prostate biopsy with Dr. Melendrez  in 2011 which was negative. He was referred to Dr. Dubois on 9/28/21 for evaluation of kidney mass and elevated PSA. He notices weak stream, occasional staining to intimate stream, urinary frequency, and nocturia x 2.  He repeated PSA on 9/28/21 which was 58.40. He had MRI performed on 10/18/21 which showed 5.9 x 6.1 x 7.6 cm; volume 142 mL prostate with PIRADS 5 lesion measuring 1.6 cm Left posterolateral midgland peripheral zone. 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 Bone scans on 11/8/21 showed several osseous metastases. Dr. Tejada performed a targeted prostate biopsy with the UroNav MRI fusion on 11/11/20, pathology confirmed Adenocarcinoma of the prostate, Prognostic Grade Group 4 (Maira score 4+4=8). He is scheduled for ThuLEP with Dr. Brito on  12/9/21. \par \par 10/18/21 MRI at [place] \par -5.9 x 6.1 x 7.6 cm; volume 142 mL prostate with PIRADS 5 lesion measuring 1.6 cm Left posterolateral midgland peripheral zone. 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 \par \par 11/8/21 Bone scans  \par Findings: Focal radiotracer activity in the right clavicular head, probable right second rib, probable T6 vertebral body, and left sacrum, suspicious for osseous metastasis. Focal radiotracer activity in the L3 vertebral body, indeterminate. \par Symmetric bilateral renal radiotracer activity. No abnormal focal soft tissue activity. \par Distended urinary bladder with urinary radiotracer activity, obscuring pelvic bones, limiting evaluation. \par Impression: \par Several osseous metastases as described above. \par \par 11/11/21 \par Surgical Pathology Report  \par Final Diagnosis \par 1. Prostate, left anterior apex, biopsy \par -Benign prostate tissue. \par 2. Prostate, left anterior base, biopsy \par -Benign prostate tissue. \par 3. Prostate, right anterior apex, biopsy \par -Benign prostate tissue. \par 4. Prostate, right anterior base, biopsy \par -Benign prostate tissue. \par 5. Prostate, midline apex, biopsy \par -Benign prostate tissue. \par 6. Prostate, midline base, biopsy \par -Benign prostate tissue. \par 7. Prostate, left posterior apex, biopsy \par -Adenocarcinoma of the prostate, Prognostic Grade Group 4 (Maira score 4+4=8) involving 70% (7.5 mm in length) of 1 of 1 core(s). \par 8. Prostate, left posterior base, biopsy \par -Benign prostate tissue. \par 9. Prostate, right posterior apex, biopsy \par -Adenocarcinoma of the prostate, Prognostic Grade Group 1 (Maira score 3+3=6) involving less than 5% (less than 0.5 mm in length) of 1 of 1 core(s). \par 10. Prostate, right posterior base, biopsy \par -Benign prostate tissue. \par 11. Prostate, left lateral, biopsy \par -Benign prostate tissue. \par 12. Prostate, right lateral, biopsy \par -Benign prostate tissue. \par 13. Prostate, left mid PZPL, biopsy \par -Adenocarcinoma of the prostate, Prognostic Grade Group 4 (Maira score 4+4=8) involving 60% (5.5 mm in length) of 1 of 4 core(s). \par -Adenocarcinoma of the prostate, Prognostic Grade Group 1 ([place]burne score 3+3=6) involving 5% (0.5 mm in length) of 1 of 4 core(s). \par -A total of 2 of 4 cores involved by carcinoma. \par Note: Cribriform Maira pattern 4 is present. \par \par MLH1:   Intact nuclear expression\par MSH2:   Intact nuclear expression\par MSH6:   Intact nuclear expression\par PMS2:   Intact nuclear expression\par \par Interpretation:  No loss of nuclear expression of MMR proteins (MLH1, MSH2, MSH6, and PMS2).   These results show low probability of microsatellite\par instability-high (MSI-H). There is no evidence of DNA mismatch repair deficiency in the analyzed tissue, indicating there is a low probability for Barrera syndrome (a\par common cause hereditary non polyposis colorectal cancer or HNPCC).\par \par 12/9/21 s/p laser enucleation of the prostate with morcellator\par Surgical Pathology Report \par Final Diagnosis\par 1. Prostate chips:\par - Benign prostatic hyperplasia and foci of prostatic adenocarcinoma (Maira's pattern 3+4 total score 7) grade group 2 involving less than 5% of the submitted tissue\par - Cleburne's pattern 4 is less than 5% of the tumor\par 2. Bladder stone:\par - Calculus, gross diagnosis.\par - Specimen submitted for chemical analysis and will be reported separately\par \par 12/15/21 CT ABDOMEN AND PELVIS IC & CT CHEST IC\par Diffusely thick-walled urinary bladder which could be due to cystitis or bladder outlet obstruction. Perivesical fat infiltration suggesting cystitis.\par Enlarged prostate. Large TURP defect. No retroperitoneal or pelvic adenopathy.\par Indeterminate 1.9 cm cortical lesion in the lower pole left kidney. Could represent complicated cyst or tumor. Findings should be correlated with targeted renal sonography and/or MR.\par No intrathoracic adenopathy.\par Bone lesions T7, T11, L3 and L5 vertebral bodies and right second rib consistent with metastases.\par Staging for prostatic carcinoma at present appears to be T2, N0, M1b.\par \par 1/7/22 FO NGS\par CDK12 K418fs*14 \par \par Microsatellite status - MS-Stable\par Tumor Mutational [place] - 3 Muts/Mb\par Genomic Findings\par For a complete list of the genes assayed, please refer to the Appendix.\par CDK12 K418fs*14\par FGFR3 amplification\par FGFR4 amplification - equivocal†\par MDM2 amplification\par MDM4 amplification - equivocal†\par PIK3CA amplification\par CIC loss\par IGF1R amplification\par IKBKE amplification - equivocal†\par IRS2 amplification - equivocal†\par MYCL1 amplification - equivocal†\par ACE6W6C amplification - equivocal†\par \par 1/26/22 PSMA\par Multiple DCFPyL-avid osseous metastases. [de-identified] : Patient presents to clinic today for f/u with fiance at side. Patient states he is feeling well. He states mild urinary incontinence and nosebleeds. Denies SOB/CP, n/v/c/d, fever, and abdominal pain.

## 2022-02-09 NOTE — ASSESSMENT
[FreeTextEntry1] : Mr. 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 Dubois. s/p ThuLEP on 12/9/21, path confirmed foci of prostatic adenocarcinoma (Maira's pattern 3+4 total score 7, grade group 2 ) He started Zytiga/prednisone on 12/16/21 Recieved lupron on 12/29/21   Here for f/u after PSMA scan on 1/26/22 showing multiple DCFPyL-avid osseous metastases.\par \par PSA : 78.3 (12/23/21), 58.40 on 9/29/21, 20.66 7/6/21 \par \par 10/18/21 MRI at [facility] \par -5.9 x 6.1 x 7.6 cm; volume 142 mL prostate with PIRADS 5 lesion measuring 1.6 cm Left posterolateral midgland peripheral zone. 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 \par \par 11/8/21 Bone scans  \par Findings: Focal radiotracer activity in the right clavicular head, probable right second rib, probable T6 vertebral body, and left sacrum, suspicious for osseous metastasis. Focal radiotracer activity in the L3 vertebral body, indeterminate. \par Symmetric bilateral renal radiotracer activity. No abnormal focal soft tissue activity. \par Distended urinary bladder with urinary radiotracer activity, obscuring pelvic bones, limiting evaluation. \par Impression: \par Several osseous metastases as described above. \par \par 11/11/21 \par Surgical Pathology Report  \par Final Diagnosis \par 1. Prostate, left anterior apex, biopsy \par -Benign prostate tissue. \par 2. Prostate, left anterior base, biopsy \par -Benign prostate tissue. \par 3. Prostate, right anterior apex, biopsy \par -Benign prostate tissue. \par 4. Prostate, right anterior base, biopsy \par -Benign prostate tissue. \par 5. Prostate, midline apex, biopsy \par -Benign prostate tissue. \par 6. Prostate, midline base, biopsy \par -Benign prostate tissue. \par 7. Prostate, left posterior apex, biopsy \par -Adenocarcinoma of the prostate, Prognostic Grade Group 4 (McNabb score 4+4=8) involving 70% (7.5 mm in length) of 1 of 1 core(s). \par 8. Prostate, left posterior base, biopsy \par -Benign prostate tissue. \par 9. Prostate, right posterior apex, biopsy \par -Adenocarcinoma of the prostate, Prognostic Grade Group 1 (Maira score 3+3=6) involving less than 5% (less than 0.5 mm in length) of 1 of 1 core(s). \par 10. Prostate, right posterior base, biopsy \par -Benign prostate tissue. \par 11. Prostate, left lateral, biopsy \par -Benign prostate tissue. \par 12. Prostate, right lateral, biopsy \par -Benign prostate tissue. \par 13. Prostate, left mid PZPL, biopsy \par -Adenocarcinoma of the prostate, Prognostic Grade Group 4 ([place]abb score 4+4=8) involving 60% (5.5 mm in length) of 1 of 4 core(s). \par -Adenocarcinoma of the prostate, Prognostic Grade Group 1 ([place] score 3+3=6) involving 5% (0.5 mm in length) of 1 of 4 core(s). \par -A total of 2 of 4 cores involved by carcinoma. \par Note: Cribriform Maira pattern 4 is present. \par \par MLH1:   Intact nuclear expression\par MSH2:   Intact nuclear expression\par MSH6:   Intact nuclear expression\par PMS2:   Intact nuclear expression\par \par Interpretation:  No loss of nuclear expression of MMR proteins (MLH1, MSH2, MSH6, and PMS2).   These results show low probability of microsatellite instability-high (MSI-H). There is no evidence of DNA mismatch repair deficiency in the analyzed tissue, indicating there is a low probability for Barrera syndrome (a common cause hereditary non polyposis colorectal cancer or HNPCC).\par \par 12/9/21 s/p laser enucleation of the prostate with morcellator\par Surgical Pathology Report \par Final Diagnosis\par 1. Prostate chips:\par - Benign prostatic hyperplasia and foci of prostatic adenocarcinoma (Maira's pattern 3+4 total score 7) grade group 2 involving less than 5% of the submitted tissue\par - Maira's pattern 4 is less than 5% of the tumor\par 2. Bladder stone:\par - Calculus, gross diagnosis.\par - Specimen submitted for chemical analysis and will be reported separately\par \par 12/15/21 CT ABDOMEN AND PELVIS IC & CT CHEST IC\par Diffusely thick-walled urinary bladder which could be due to cystitis or bladder outlet obstruction. Perivesical fat infiltration suggesting cystitis.\par Enlarged prostate. Large TURP defect. No retroperitoneal or pelvic adenopathy.\par Indeterminate 1.9 cm cortical lesion in the lower pole left kidney. Could represent complicated cyst or tumor. Findings should be correlated with targeted renal sonography and/or MR.\par No intrathoracic adenopathy.\par Bone lesions T7, T11, L3 and L5 vertebral bodies and right second rib consistent with metastases.\par Staging for prostatic carcinoma at present appears to be T2, N0, M1b.\par \par 1/7/22 FO NGS\par CDK12 K418fs*14 \par Microsatellite status - MS-Stable\par Tumor Mutational [place] - 3 Muts/Mb\par Genomic Findings\par For a complete list of the genes assayed, please refer to the Appendix.\par CDK12 K418fs*14\par FGFR3 amplification\par FGFR4 amplification - equivocal†\par MDM2 amplification\par MDM4 amplification - equivocal†\par PIK3CA amplification\par CIC loss\par IGF1R amplification\par IKBKE amplification - equivocal†\par IRS2 amplification - equivocal†\par MYCL1 amplification - equivocal†\par ADV0B3F amplification - equivocal†\par \par 1/26/22 PSMA\par Multiple DCFPyL-avid osseous metastases.\par \par Plan: MARK 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 Indeterminate 1.9 cm cortical lesion in the lower pole left kidney.Bone lesions T7, T11, L3 and L5 vertebral bodies and right second rib\par -PSA trending down 8.95 (1/13/22), 78.3(12/22), 220(12/8/21) \par -PSMA scan on 1/26/22 showing multiple DCFPyL-avid osseous metastases.\par -Received covid vaccine x 2(August/2021),  will get booster shot in Feb 2022. \par -Will send BRCA 1/2 to [facility] today\par -FO NGS with CDK12 K418fs*14 (Lynparza® (Olaparib)\par - Started zytiga/pred on 12/16. tolerating tx. \par -f/u with Dr. Dubois/Daryl for ADT, Received Lupron on 12/29/21 every 3 months\par -Start Alvarado/Vit D, will start xgeva after dental visit\par -He was provided with patient educational materials for xgeva. Risks, benefits and alternatives were discussed. He was also provided with the opportunity to asks questions. \par \par -RTC 4 weeks\par \par Trial data\par The PEACE-1 trial had a 2 × 2 factorial design. Patients with de aishwarya metastatic castration-sensitive prostate cancer (n = 1,173) were randomly assigned 1:1:1:1 to receive the standard of care (n = 296); the standard of care plus abiraterone (n = 292); the standard of care plus radiotherapy (n = 293); or the standard of care plus abiraterone plus radiotherapy (n = 292). All patients had continuous androgen-deprivation therapy. Standard treatments were defined as androgen-deprivation therapy with or without docetaxel. Stratification factors included Eastern Cooperative Oncology Group performance status (0 or 1), metastatic sites (lymph node vs bone vs visceral), type of castration (orchiectomy vs androgen-deprivation therapy), and docetaxel (yes or no).\par \par Disease and demographic factors were well balanced at baseline. At baseline, in the population given androgen-deprivation therapy plus docetaxel (accounting for 710 patients), the median age was 66, and about 78% had a Maira score > 8. About 80% had bone-alone metastasis; 8% had lymph-node–alone metastasis; and about 12% had visceral metastasis. About 36% had low-volume disease, and 64% had high-volume disease.\par \par Co-primary endpoints were radiographic ­progression-free survival and overall survival. Radiographic progression-free survival was significantly improved with the addition of abiraterone acetate plus prednisone to androgen-deprivation therapy plus docetaxel (P < .0001). At a median follow-up of 4.5 years, in patients treated with abiraterone acetate plus prednisone and the standard of care, 139 events were reported; at a median follow-up of 2 years in the standard-of-care-alone arm, there were 211 events.\par \par Looking at radiographic progression-free survival in the population given androgen-deprivation therapy plus docetaxel (with or without radiotherapy), according to disease burden, the addition of abiraterone acetate plus prednisone benefited both groups, but the benefit tended to be greater in the group with high-volume disease. Median radiographic progression-free survival was 4.1 years when abiraterone acetate plus prednisone was added vs 1.6 years for those with high-volume disease who received the standard of care (< .0001), compared with not reached vs a median of 2.7 years in those with low-volume disease (P = .006).\par \par Overall Survival\par \par Follow-up for overall survival was 4.4 years in the overall population; 5.7 years in the control arm of androgen-deprivation therapy alone with or without radiotherapy; and 3.8 years in the control arm of androgen-deprivation therapy plus docetaxel with or without radiotherapy. Overall survival was improved by 25% with the addition of abiraterone acetate plus prednisone to androgen-deprivation therapy plus docetaxel vs the standard of care alone: not reached vs a median of 4.4 years, respectively (P = .017).\par \par A subgroup analysis of overall survival showed a benefit for abiraterone acetate plus prednisone added to androgen-deprivation therapy plus docetaxel in all subgroups, including those stratified by receipt of radiotherapy, performance status, type of castration, and metastatic burden. In patients with high-volume disease, overall survival was improved by 28% with the addition of abiraterone acetate plus prednisone to androgen-deprivation therapy plus docetaxel: median of 5.1 years vs 3.5 years with the standard of care (P = .019). In patients with low-volume disease, overall survival is immature at this point, and medians were not reached in either group.\par \par The overall survival benefit translates to a median lifetime gain of more than 1.5 years for men with high-volume metastases.\par

## 2022-02-09 NOTE — REVIEW OF SYSTEMS
[Recent Change In Weight] : ~T recent weight change [Hoarseness] : hoarseness [Insomnia] : insomnia [Negative] : Allergic/Immunologic [Nosebleeds] : nosebleeds [Incontinence] : incontinence [Hot Flashes] : hot flashes [Dysphagia] : no dysphagia [Loss of Hearing] : no loss of hearing [Odynophagia] : no odynophagia [Mucosal Pain] : no mucosal pain [Constipation] : no constipation [Anxiety] : no anxiety [FreeTextEntry2] : weight gain

## 2022-02-09 NOTE — DISCUSSION/SUMMARY
[FreeTextEntry1] : Informed patient that he is scheduled for PSMA scan on 1/26/22 @ 230pm  [address]

## 2022-02-14 LAB
PSA FREE FLD-MCNC: 41 %
PSA FREE SERPL-MCNC: 0.27 NG/ML
PSA SERPL-MCNC: 0.67 NG/ML
TESTOST FREE SERPL-MCNC: 0.1 PG/ML
TESTOST SERPL-MCNC: <2.5 NG/DL

## 2022-03-23 ENCOUNTER — LABORATORY RESULT (OUTPATIENT)
Age: 77
End: 2022-03-23

## 2022-03-23 ENCOUNTER — APPOINTMENT (OUTPATIENT)
Dept: HEMATOLOGY ONCOLOGY | Facility: CLINIC | Age: 77
End: 2022-03-23
Payer: MEDICARE

## 2022-03-23 ENCOUNTER — APPOINTMENT (OUTPATIENT)
Age: 77
End: 2022-03-23

## 2022-03-23 VITALS
TEMPERATURE: 96.4 F | WEIGHT: 156 LBS | HEART RATE: 90 BPM | DIASTOLIC BLOOD PRESSURE: 81 MMHG | OXYGEN SATURATION: 100 % | RESPIRATION RATE: 18 BRPM | SYSTOLIC BLOOD PRESSURE: 148 MMHG | HEIGHT: 66 IN | BODY MASS INDEX: 25.07 KG/M2

## 2022-03-23 DIAGNOSIS — R97.20 ELEVATED PROSTATE, SPECIFIC ANTIGEN [PSA]: ICD-10-CM

## 2022-03-23 LAB
ALBUMIN SERPL ELPH-MCNC: 3.5 G/DL
ALP BLD-CCNC: 105 U/L
ALT SERPL-CCNC: 30 U/L
ANION GAP SERPL CALC-SCNC: 5 MMOL/L
AST SERPL-CCNC: 26 U/L
BASOPHILS # BLD AUTO: 0.04 K/UL
BASOPHILS NFR BLD AUTO: 0.5 %
BILIRUB SERPL-MCNC: 1.1 MG/DL
BUN SERPL-MCNC: 20 MG/DL
CALCIUM SERPL-MCNC: 10 MG/DL
CHLORIDE SERPL-SCNC: 106 MMOL/L
CO2 SERPL-SCNC: 30 MMOL/L
CREAT SERPL-MCNC: 1.1 MG/DL
EGFR: 70 ML/MIN/1.73M2
EOSINOPHIL # BLD AUTO: 0.04 K/UL
EOSINOPHIL NFR BLD AUTO: 0.5 %
GLUCOSE SERPL-MCNC: 75 MG/DL
HCT VFR BLD CALC: 41.7 %
HGB BLD-MCNC: 13.5 G/DL
IMM GRANULOCYTES NFR BLD AUTO: 0.7 %
LYMPHOCYTES # BLD AUTO: 3.96 K/UL
LYMPHOCYTES NFR BLD AUTO: 45.2 %
MAN DIFF?: NORMAL
MCHC RBC-ENTMCNC: 30.8 PG
MCHC RBC-ENTMCNC: 32.4 GM/DL
MCV RBC AUTO: 95 FL
MONOCYTES # BLD AUTO: 0.68 K/UL
MONOCYTES NFR BLD AUTO: 7.8 %
NEUTROPHILS # BLD AUTO: 3.99 K/UL
NEUTROPHILS NFR BLD AUTO: 45.3 %
PLATELET # BLD AUTO: 190 K/UL
POTASSIUM SERPL-SCNC: 4.4 MMOL/L
PROT SERPL-MCNC: 7 G/DL
RBC # BLD: 4.39 M/UL
RBC # FLD: 13.9 %
SODIUM SERPL-SCNC: 141 MMOL/L
WBC # FLD AUTO: 8.77 K/UL

## 2022-03-23 PROCEDURE — 99214 OFFICE O/P EST MOD 30 MIN: CPT

## 2022-03-24 NOTE — HISTORY OF PRESENT ILLNESS
[de-identified] : Mr. 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 Dubois. s/p ThuLEP on 12/9/21, path confirmed foci of prostatic adenocarcinoma (Maira's pattern 3+4 total score 7, grade group 2 ) He started Zytiga/prednisone on 12/16/21  Here for f/u after PSMA scan on 1/26/22 showing multiple DCFPyL-avid osseous metastases. Here for f/u and lupron injection\par \par Colonoscopy in 2013, reportedly normal\par FMH- Sister with breast cancer, Half brother with prostate cancer\par \par ONC Hx\par Mr. Ferrer was being evaluated for 20 lb weight loss over 1 year with the inability to gain weight. He underwent CT scans which showed lower pole LEFT kidney 1.8 x 1.6 x 2.0 cm slightly exophytic solid enhancing mass, multiple bilateral simple cysts. markedly enlarged prostate gland with distended bladder and bladder diverticuli. PSA was 20.66 on 7/6/21. He had prostate biopsy with Dr. Melendrez  in 2011 which was negative. He was referred to Dr. Dubois on 9/28/21 for evaluation of kidney mass and elevated PSA. He notices weak stream, occasional staining to intimate stream, urinary frequency, and nocturia x 2.  He repeated PSA on 9/28/21 which was 58.40. He had MRI performed on 10/18/21 which showed 5.9 x 6.1 x 7.6 cm; volume 142 mL prostate with PIRADS 5 lesion measuring 1.6 cm Left posterolateral midgland peripheral zone. 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 Bone scans on 11/8/21 showed several osseous metastases. Dr. Tejada performed a targeted prostate biopsy with the UroNav MRI fusion on 11/11/20, pathology confirmed Adenocarcinoma of the prostate, Prognostic Grade Group 4 (Maira score 4+4=8). He is scheduled for ThuLEP with Dr. Brito on  12/9/21. \par \par 10/18/21 MRI at [facility] \par -5.9 x 6.1 x 7.6 cm; volume 142 mL prostate with PIRADS 5 lesion measuring 1.6 cm Left posterolateral midgland peripheral zone. 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 \par \par 11/8/21 Bone scans  \par Findings: Focal radiotracer activity in the right clavicular head, probable right second rib, probable T6 vertebral body, and left sacrum, suspicious for osseous metastasis. Focal radiotracer activity in the L3 vertebral body, indeterminate. \par Symmetric bilateral renal radiotracer activity. No abnormal focal soft tissue activity. \par Distended urinary bladder with urinary radiotracer activity, obscuring pelvic bones, limiting evaluation. \par Impression: \par Several osseous metastases as described above. \par \par 11/11/21 \par Surgical Pathology Report  \par Final Diagnosis \par 1. Prostate, left anterior apex, biopsy \par -Benign prostate tissue. \par 2. Prostate, left anterior base, biopsy \par -Benign prostate tissue. \par 3. Prostate, right anterior apex, biopsy \par -Benign prostate tissue. \par 4. Prostate, right anterior base, biopsy \par -Benign prostate tissue. \par 5. Prostate, midline apex, biopsy \par -Benign prostate tissue. \par 6. Prostate, midline base, biopsy \par -Benign prostate tissue. \par 7. Prostate, left posterior apex, biopsy \par -Adenocarcinoma of the prostate, Prognostic Grade Group 4 (Maira score 4+4=8) involving 70% (7.5 mm in length) of 1 of 1 core(s). \par 8. Prostate, left posterior base, biopsy \par -Benign prostate tissue. \par 9. Prostate, right posterior apex, biopsy \par -Adenocarcinoma of the prostate, Prognostic Grade Group 1 (Maira score 3+3=6) involving less than 5% (less than 0.5 mm in length) of 1 of 1 core(s). \par 10. Prostate, right posterior base, biopsy \par -Benign prostate tissue. \par 11. Prostate, left lateral, biopsy \par -Benign prostate tissue. \par 12. Prostate, right lateral, biopsy \par -Benign prostate tissue. \par 13. Prostate, left mid PZPL, biopsy \par -Adenocarcinoma of the prostate, Prognostic Grade Group 4 ([place] score 4+4=8) involving 60% (5.5 mm in length) of 1 of 4 core(s). \par -Adenocarcinoma of the prostate, Prognostic Grade Group 1 (Maira score 3+3=6) involving 5% (0.5 mm in length) of 1 of 4 core(s). \par -A total of 2 of 4 cores involved by carcinoma. \par Note: Cribriform Maira pattern 4 is present. \par \par MLH1:   Intact nuclear expression\par MSH2:   Intact nuclear expression\par MSH6:   Intact nuclear expression\par PMS2:   Intact nuclear expression\par \par Interpretation:  No loss of nuclear expression of MMR proteins (MLH1, MSH2, MSH6, and PMS2).   These results show low probability of microsatellite\par instability-high (MSI-H). There is no evidence of DNA mismatch repair deficiency in the analyzed tissue, indicating there is a low probability for Barrera syndrome (a\par common cause hereditary non polyposis colorectal cancer or HNPCC).\par \par 12/9/21 s/p laser enucleation of the prostate with morcellator\par Surgical Pathology Report \par Final Diagnosis\par 1. Prostate chips:\par - Benign prostatic hyperplasia and foci of prostatic adenocarcinoma ([place]'s pattern 3+4 total score 7) grade group 2 involving less than 5% of the submitted tissue\par - [place]'s pattern 4 is less than 5% of the tumor\par 2. Bladder stone:\par - Calculus, gross diagnosis.\par - Specimen submitted for chemical analysis and will be reported separately\par \par 12/15/21 CT ABDOMEN AND PELVIS IC & CT CHEST IC\par Diffusely thick-walled urinary bladder which could be due to cystitis or bladder outlet obstruction. Perivesical fat infiltration suggesting cystitis.\par Enlarged prostate. Large TURP defect. No retroperitoneal or pelvic adenopathy.\par Indeterminate 1.9 cm cortical lesion in the lower pole left kidney. Could represent complicated cyst or tumor. Findings should be correlated with targeted renal sonography and/or MR.\par No intrathoracic adenopathy.\par Bone lesions T7, T11, L3 and L5 vertebral bodies and right second rib consistent with metastases.\par Staging for prostatic carcinoma at present appears to be T2, N0, M1b.\par \par 1/7/22 FO NGS\par CDK12 K418fs*14 \par \par Microsatellite status - MS-Stable\par Tumor Mutational [place] - 3 Muts/Mb\par Genomic Findings\par For a complete list of the genes assayed, please refer to the Appendix.\par CDK12 K418fs*14\par FGFR3 amplification\par FGFR4 amplification - equivocal†\par MDM2 amplification\par MDM4 amplification - equivocal†\par PIK3CA amplification\par CIC loss\par IGF1R amplification\par IKBKE amplification - equivocal†\par IRS2 amplification - equivocal†\par MYCL1 amplification - equivocal†\par [id number] amplification - equivocal†\par \par 1/26/22 PSMA\par Multiple DCFPyL-avid osseous metastases. [de-identified] : Patient presents to clinic today for f/u with fiance at side. \par Patient states he is feeling well overall. Reports weight gain and good appetite. Denies any other focal bone pain. Normal BMs. \par He reports chronic back pain intermittently, occurs with activity, better than before. Does not require pain medications. \par He denies any hematuria, abdominal pain, n/v/d/c, melena, chest pain, sob, leg swelling or joint  pan .  \par Reports 1 week of epistaxis, which resolved on its own. \par Reports hot flashes 2-3 times a week, which is manageable.

## 2022-03-24 NOTE — REVIEW OF SYSTEMS
[Recent Change In Weight] : ~T recent weight change [Nosebleeds] : nosebleeds [Incontinence] : incontinence [Insomnia] : insomnia [Hot Flashes] : hot flashes [Negative] : Allergic/Immunologic [Dysphagia] : no dysphagia [Loss of Hearing] : no loss of hearing [Hoarseness] : no hoarseness [Odynophagia] : no odynophagia [Mucosal Pain] : no mucosal pain [Constipation] : no constipation [Anxiety] : no anxiety [FreeTextEntry2] : weight gain  [FreeTextEntry9] : Chronic back pain

## 2022-03-24 NOTE — DISCUSSION/SUMMARY
[FreeTextEntry1] : Informed patient that he is scheduled for PSMA scan on 1/26/22 @ 230pm  [address]

## 2022-03-24 NOTE — ASSESSMENT
[FreeTextEntry1] : Mr. 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 Dubois. s/p ThuLEP on 12/9/21, path confirmed foci of prostatic adenocarcinoma (Maira's pattern 3+4 total score 7, grade group 2 ) He started Zytiga/prednisone on 12/16/21 Recieved lupron on 12/29/21   Here for f/u after PSMA scan on 1/26/22 showing multiple DCFPyL-avid osseous metastases. Here for f/u and lupron injection\par \par PSA : 0.67 (2/10/22), 78.3 (12/23/21), 58.40 on 9/29/21, 20.66 7/6/21 \par \par 10/18/21 MRI at [facility] \par -5.9 x 6.1 x 7.6 cm; volume 142 mL prostate with PIRADS 5 lesion measuring 1.6 cm Left posterolateral midgland peripheral zone. 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 \par \par 11/8/21 Bone scans  \par Findings: Focal radiotracer activity in the right clavicular head, probable right second rib, probable T6 vertebral body, and left sacrum, suspicious for osseous metastasis. Focal radiotracer activity in the L3 vertebral body, indeterminate. \par Symmetric bilateral renal radiotracer activity. No abnormal focal soft tissue activity. \par Distended urinary bladder with urinary radiotracer activity, obscuring pelvic bones, limiting evaluation. \par Impression: \par Several osseous metastases as described above. \par \par 11/11/21 \par Surgical Pathology Report  \par Final Diagnosis \par 1. Prostate, left anterior apex, biopsy \par -Benign prostate tissue. \par 2. Prostate, left anterior base, biopsy \par -Benign prostate tissue. \par 3. Prostate, right anterior apex, biopsy \par -Benign prostate tissue. \par 4. Prostate, right anterior base, biopsy \par -Benign prostate tissue. \par 5. Prostate, midline apex, biopsy \par -Benign prostate tissue. \par 6. Prostate, midline base, biopsy \par -Benign prostate tissue. \par 7. Prostate, left posterior apex, biopsy \par -Adenocarcinoma of the prostate, Prognostic Grade Group 4 (Brunswick score 4+4=8) involving 70% (7.5 mm in length) of 1 of 1 core(s). \par 8. Prostate, left posterior base, biopsy \par -Benign prostate tissue. \par 9. Prostate, right posterior apex, biopsy \par -Adenocarcinoma of the prostate, Prognostic Grade Group 1 (Maira score 3+3=6) involving less than 5% (less than 0.5 mm in length) of 1 of 1 core(s). \par 10. Prostate, right posterior base, biopsy \par -Benign prostate tissue. \par 11. Prostate, left lateral, biopsy \par -Benign prostate tissue. \par 12. Prostate, right lateral, biopsy \par -Benign prostate tissue. \par 13. Prostate, left mid PZPL, biopsy \par -Adenocarcinoma of the prostate, Prognostic Grade Group 4 (Brunswick score 4+4=8) involving 60% (5.5 mm in length) of 1 of 4 core(s). \par -Adenocarcinoma of the prostate, Prognostic Grade Group 1 (Brunswick score 3+3=6) involving 5% (0.5 mm in length) of 1 of 4 core(s). \par -A total of 2 of 4 cores involved by carcinoma. \par Note: Cribriform Brunswick pattern 4 is present. \par \par MLH1:   Intact nuclear expression\par MSH2:   Intact nuclear expression\par MSH6:   Intact nuclear expression\par PMS2:   Intact nuclear expression\par \par Interpretation:  No loss of nuclear expression of MMR proteins (MLH1, MSH2, MSH6, and PMS2).   These results show low probability of microsatellite instability-high (MSI-H). There is no evidence of DNA mismatch repair deficiency in the analyzed tissue, indicating there is a low probability for Barrera syndrome (a common cause hereditary non polyposis colorectal cancer or HNPCC).\par \par 12/9/21 s/p laser enucleation of the prostate with morcellator\par Surgical Pathology Report \par Final Diagnosis\par 1. Prostate chips:\par - Benign prostatic hyperplasia and foci of prostatic adenocarcinoma (Maira's pattern 3+4 total score 7) grade group 2 involving less than 5% of the submitted tissue\par - Maira's pattern 4 is less than 5% of the tumor\par 2. Bladder stone:\par - Calculus, gross diagnosis.\par - Specimen submitted for chemical analysis and will be reported separately\par \par 12/15/21 CT ABDOMEN AND PELVIS IC & CT CHEST IC\par Diffusely thick-walled urinary bladder which could be due to cystitis or bladder outlet obstruction. Perivesical fat infiltration suggesting cystitis.\par Enlarged prostate. Large TURP defect. No retroperitoneal or pelvic adenopathy.\par Indeterminate 1.9 cm cortical lesion in the lower pole left kidney. Could represent complicated cyst or tumor. Findings should be correlated with targeted renal sonography and/or MR.\par No intrathoracic adenopathy.\par Bone lesions T7, T11, L3 and L5 vertebral bodies and right second rib consistent with metastases.\par Staging for prostatic carcinoma at present appears to be T2, N0, M1b.\par \par 1/7/22 FO NGS\par CDK12 K418fs*14 \par Microsatellite status - MS-Stable\par Tumor Mutational [place] - 3 Muts/Mb\par Genomic Findings\par For a complete list of the genes assayed, please refer to the Appendix.\par CDK12 K418fs*14\par FGFR3 amplification\par FGFR4 amplification - equivocal†\par MDM2 amplification\par MDM4 amplification - equivocal†\par PIK3CA amplification\par CIC loss\par IGF1R amplification\par IKBKE amplification - equivocal†\par IRS2 amplification - equivocal†\par MYCL1 amplification - equivocal†\par HCU6B6D amplification - equivocal†\par \par 1/26/22 PSMA\par Multiple DCFPyL-avid osseous metastases.\par \par Plan: MARK 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 Indeterminate 1.9 cm cortical lesion in the lower pole left kidney.Bone lesions T7, T11, L3 and L5 vertebral bodies and right second rib\par -PSA trending down 0.67 (2/10/22), 8.95 (1/13/22), 78.3(12/22), 220(12/8/21) \par -PSMA scan on 1/26/22 showing multiple DCFPyL-avid osseous metastases.\par -Received covid vaccine x 2 (August/2021),  s/p booster shot in Feb, 22, 2022. \par - Negative for INVITAE BRCA 1/2 \par -FO NGS with CDK12 K418fs*14 (Lynparza® (Olaparib), will get Invitae and refer for genetic counseling\par - Started zytiga/pred on 12/16. tolerating tx. \par -f/u with Dr. Dubois/Daryl for ADT, Received Lupron on 12/29/21 every 3 months. Due this month. \par -Started Alvarado/Vit D, will start xgeva after dental visit. Has not seen the dentist yet.  \par \par -RTC 4 weeks\par \par Trial data\par The PEACE-1 trial had a 2 × 2 factorial design. Patients with de aishwarya metastatic castration-sensitive prostate cancer (n = 1,173) were randomly assigned 1:1:1:1 to receive the standard of care (n = 296); the standard of care plus abiraterone (n = 292); the standard of care plus radiotherapy (n = 293); or the standard of care plus abiraterone plus radiotherapy (n = 292). All patients had continuous androgen-deprivation therapy. Standard treatments were defined as androgen-deprivation therapy with or without docetaxel. Stratification factors included Eastern Cooperative Oncology Group performance status (0 or 1), metastatic sites (lymph node vs bone vs visceral), type of castration (orchiectomy vs androgen-deprivation therapy), and docetaxel (yes or no).\par \par Disease and demographic factors were well balanced at baseline. At baseline, in the population given androgen-deprivation therapy plus docetaxel (accounting for 710 patients), the median age was 66, and about 78% had a Maira score > 8. About 80% had bone-alone metastasis; 8% had lymph-node–alone metastasis; and about 12% had visceral metastasis. About 36% had low-volume disease, and 64% had high-volume disease.\par \par Co-primary endpoints were radiographic ­progression-free survival and overall survival. Radiographic progression-free survival was significantly improved with the addition of abiraterone acetate plus prednisone to androgen-deprivation therapy plus docetaxel (P < .0001). At a median follow-up of 4.5 years, in patients treated with abiraterone acetate plus prednisone and the standard of care, 139 events were reported; at a median follow-up of 2 years in the standard-of-care-alone arm, there were 211 events.\par \par Looking at radiographic progression-free survival in the population given androgen-deprivation therapy plus docetaxel (with or without radiotherapy), according to disease burden, the addition of abiraterone acetate plus prednisone benefited both groups, but the benefit tended to be greater in the group with high-volume disease. Median radiographic progression-free survival was 4.1 years when abiraterone acetate plus prednisone was added vs 1.6 years for those with high-volume disease who received the standard of care (< .0001), compared with not reached vs a median of 2.7 years in those with low-volume disease (P = .006).\par \par Overall Survival\par \par Follow-up for overall survival was 4.4 years in the overall population; 5.7 years in the control arm of androgen-deprivation therapy alone with or without radiotherapy; and 3.8 years in the control arm of androgen-deprivation therapy plus docetaxel with or without radiotherapy. Overall survival was improved by 25% with the addition of abiraterone acetate plus prednisone to androgen-deprivation therapy plus docetaxel vs the standard of care alone: not reached vs a median of 4.4 years, respectively (P = .017).\par \par A subgroup analysis of overall survival showed a benefit for abiraterone acetate plus prednisone added to androgen-deprivation therapy plus docetaxel in all subgroups, including those stratified by receipt of radiotherapy, performance status, type of castration, and metastatic burden. In patients with high-volume disease, overall survival was improved by 28% with the addition of abiraterone acetate plus prednisone to androgen-deprivation therapy plus docetaxel: median of 5.1 years vs 3.5 years with the standard of care (P = .019). In patients with low-volume disease, overall survival is immature at this point, and medians were not reached in either group.\par \par The overall survival benefit translates to a median lifetime gain of more than 1.5 years for men with high-volume metastases.\par

## 2022-04-06 ENCOUNTER — APPOINTMENT (OUTPATIENT)
Dept: UROLOGY | Facility: CLINIC | Age: 77
End: 2022-04-06
Payer: MEDICARE

## 2022-04-06 VITALS
SYSTOLIC BLOOD PRESSURE: 143 MMHG | HEART RATE: 82 BPM | TEMPERATURE: 98.3 F | OXYGEN SATURATION: 99 % | DIASTOLIC BLOOD PRESSURE: 89 MMHG

## 2022-04-06 LAB
BILIRUB UR QL STRIP: NORMAL
CLARITY UR: CLEAR
COLLECTION METHOD: NORMAL
GLUCOSE UR-MCNC: NORMAL
HCG UR QL: 0.2 EU/DL
HGB UR QL STRIP.AUTO: NORMAL
KETONES UR-MCNC: NORMAL
LEUKOCYTE ESTERASE UR QL STRIP: NORMAL
NITRITE UR QL STRIP: NORMAL
PH UR STRIP: 5
PROT UR STRIP-MCNC: NORMAL
SP GR UR STRIP: 1.01

## 2022-04-06 PROCEDURE — 51741 ELECTRO-UROFLOWMETRY FIRST: CPT

## 2022-04-06 PROCEDURE — 51798 US URINE CAPACITY MEASURE: CPT

## 2022-04-06 PROCEDURE — 81003 URINALYSIS AUTO W/O SCOPE: CPT | Mod: QW

## 2022-04-06 PROCEDURE — 96402 CHEMO HORMON ANTINEOPL SQ/IM: CPT

## 2022-04-06 PROCEDURE — 99214 OFFICE O/P EST MOD 30 MIN: CPT | Mod: 25

## 2022-04-06 RX ORDER — LEUPROLIDE ACETATE 22.5 MG
22.5 KIT INTRAMUSCULAR
Qty: 1 | Refills: 0 | Status: COMPLETED | OUTPATIENT
Start: 2022-04-06

## 2022-04-06 RX ADMIN — LEUPROLIDE ACETATE 1 MG: KIT at 00:00

## 2022-04-06 NOTE — HISTORY OF PRESENT ILLNESS
[FreeTextEntry1] : 11/23/21: 76 year old man with BPH, history of urinary retention and newly diagnosed metastatic prostate cancer.\par \par Regarding prostate cancer, he had MRI at [facility] on 10/18/2021. 5.9 x 6.1 x 7.6 cm; volume 142 mL prostate with PIRADS 5 lesion measuring 1.6 cm Left posterolateral midgland peripheral zone. 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 The standard biopsy detected GG4 cancer which did overlap with the targeted biopsy. 2/12 cores. location(s). LPA GG4, RPA GG1\par \par \par Bone scan positive at multiple sites. He is scheduled to see Dr. Reed to discuss treatment for metastatic prostate cancer. He has not started ADT yet.\par \par He has long history of LUTS as well. He was taking saw palmetto for a long time. His symptoms are primarily weak stream, straining, urinary frequency, nocturia and need to defecate when urinating. He has elevated PVRs, up to 600 cc in the office. He went into urinary retention after prostate biopsy, but is now voiding. He started flomax and finasteride recently and has noticed improved urinary stream with those medications. No fevers, chills, dysuria, hematuria.\par \par 12/9/21: ThuLEP, procedure uncomplicated. Passed void trial POD 1.\par \par pathology: 91 grams, [place] 3+4 prostate cancer involving less than 5% of the tissue\par \par 12/29/21: Doing well. Voiding with strong stream, complete emptying, decreased frequency, urgency. No leakage. No hematuria. Started abiraterone/prednisone with Dr. Reed.\par \par IPSS 4, QOL 1, PVR 32\par \par 4/6/22: Doing well. Strong stream, complete emptying. No hematuria. Not wearing any pads. No stress incontinence. He does have occasional urge with small amount of leakage if he tries to hold for too long. This is infrequent. No hot flashes or bone pain. Remains on abiraterone/prednisone. Received lupron injection today.\par \par IPSS 7, QOL 2, PVR 12\par \par Uroflow: Voided volume 95.1, Qmax 24.5\par \par PSA 7/6/21: 20.66 ng/ml; 9/28/21--58.4; 12/8/21--220; 12/22/21--78.3; 1/12/22--8.95; 2/9/22--0.67; 3/23/22--0.23\par \par \par \par

## 2022-04-06 NOTE — LETTER BODY
[Dear  ___] : Dear  [unfilled], [Courtesy Letter:] : I had the pleasure of seeing your patient, [unfilled], in my office today. [Please see my note below.] : Please see my note below. [Consult Closing:] : Thank you very much for allowing me to participate in the care of this patient.  If you have any questions, please do not hesitate to contact me. [Sincerely,] : Sincerely, [FreeTextEntry3] : Josemanuel Brito MD

## 2022-04-18 NOTE — HISTORY OF PRESENT ILLNESS
[de-identified] : Mr. 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 Dubois. s/p ThuLEP on 12/9/21, path confirmed foci of prostatic adenocarcinoma (Maira's pattern 3+4 total score 7, grade group 2 ) He started Zytiga/prednisone on 12/16/21  Here for f/u after PSMA scan on 1/26/22 showing multiple DCFPyL-avid osseous metastases. Here for f/u\par \par Colonoscopy in 2013, reportedly normal\par FMH- Sister with breast cancer, Half brother with prostate cancer\par \par ONC Hx\par Mr. Ferrer was being evaluated for 20 lb weight loss over 1 year with the inability to gain weight. He underwent CT scans which showed lower pole LEFT kidney 1.8 x 1.6 x 2.0 cm slightly exophytic solid enhancing mass, multiple bilateral simple cysts. markedly enlarged prostate gland with distended bladder and bladder diverticuli. PSA was 20.66 on 7/6/21. He had prostate biopsy with Dr. Melendrez  in 2011 which was negative. He was referred to Dr. Dubois on 9/28/21 for evaluation of kidney mass and elevated PSA. He notices weak stream, occasional staining to intimate stream, urinary frequency, and nocturia x 2.  He repeated PSA on 9/28/21 which was 58.40. He had MRI performed on 10/18/21 which showed 5.9 x 6.1 x 7.6 cm; volume 142 mL prostate with PIRADS 5 lesion measuring 1.6 cm Left posterolateral midgland peripheral zone. 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 Bone scans on 11/8/21 showed several osseous metastases. Dr. Tejada performed a targeted prostate biopsy with the UroNav MRI fusion on 11/11/20, pathology confirmed Adenocarcinoma of the prostate, Prognostic Grade Group 4 (Maira score 4+4=8). He is scheduled for ThuLEP with Dr. Brito on  12/9/21. \par \par 10/18/21 MRI at [facility] \par -5.9 x 6.1 x 7.6 cm; volume 142 mL prostate with PIRADS 5 lesion measuring 1.6 cm Left posterolateral midgland peripheral zone. 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 \par \par 11/8/21 Bone scans  \par Findings: Focal radiotracer activity in the right clavicular head, probable right second rib, probable T6 vertebral body, and left sacrum, suspicious for osseous metastasis. Focal radiotracer activity in the L3 vertebral body, indeterminate. \par Symmetric bilateral renal radiotracer activity. No abnormal focal soft tissue activity. \par Distended urinary bladder with urinary radiotracer activity, obscuring pelvic bones, limiting evaluation. \par Impression: \par Several osseous metastases as described above. \par \par 11/11/21 \par Surgical Pathology Report  \par Final Diagnosis \par 1. Prostate, left anterior apex, biopsy \par -Benign prostate tissue. \par 2. Prostate, left anterior base, biopsy \par -Benign prostate tissue. \par 3. Prostate, right anterior apex, biopsy \par -Benign prostate tissue. \par 4. Prostate, right anterior base, biopsy \par -Benign prostate tissue. \par 5. Prostate, midline apex, biopsy \par -Benign prostate tissue. \par 6. Prostate, midline base, biopsy \par -Benign prostate tissue. \par 7. Prostate, left posterior apex, biopsy \par -Adenocarcinoma of the prostate, Prognostic Grade Group 4 (Maira score 4+4=8) involving 70% (7.5 mm in length) of 1 of 1 core(s). \par 8. Prostate, left posterior base, biopsy \par -Benign prostate tissue. \par 9. Prostate, right posterior apex, biopsy \par -Adenocarcinoma of the prostate, Prognostic Grade Group 1 (Burrton score 3+3=6) involving less than 5% (less than 0.5 mm in length) of 1 of 1 core(s). \par 10. Prostate, right posterior base, biopsy \par -Benign prostate tissue. \par 11. Prostate, left lateral, biopsy \par -Benign prostate tissue. \par 12. Prostate, right lateral, biopsy \par -Benign prostate tissue. \par 13. Prostate, left mid PZPL, biopsy \par -Adenocarcinoma of the prostate, Prognostic Grade Group 4 (Maira score 4+4=8) involving 60% (5.5 mm in length) of 1 of 4 core(s). \par -Adenocarcinoma of the prostate, Prognostic Grade Group 1 (Burrton score 3+3=6) involving 5% (0.5 mm in length) of 1 of 4 core(s). \par -A total of 2 of 4 cores involved by carcinoma. \par Note: Cribriform Burrton pattern 4 is present. \par \par MLH1:   Intact nuclear expression\par MSH2:   Intact nuclear expression\par MSH6:   Intact nuclear expression\par PMS2:   Intact nuclear expression\par \par Interpretation:  No loss of nuclear expression of MMR proteins (MLH1, MSH2, MSH6, and PMS2).   These results show low probability of microsatellite\par instability-high (MSI-H). There is no evidence of DNA mismatch repair deficiency in the analyzed tissue, indicating there is a low probability for Barrera syndrome (a\par common cause hereditary non polyposis colorectal cancer or HNPCC).\par \par 12/9/21 s/p laser enucleation of the prostate with morcellator\par Surgical Pathology Report \par Final Diagnosis\par 1. Prostate chips:\par - Benign prostatic hyperplasia and foci of prostatic adenocarcinoma (Maira's pattern 3+4 total score 7) grade group 2 involving less than 5% of the submitted tissue\par - Maira's pattern 4 is less than 5% of the tumor\par 2. Bladder stone:\par - Calculus, gross diagnosis.\par - Specimen submitted for chemical analysis and will be reported separately\par \par 12/15/21 CT ABDOMEN AND PELVIS IC & CT CHEST IC\par Diffusely thick-walled urinary bladder which could be due to cystitis or bladder outlet obstruction. Perivesical fat infiltration suggesting cystitis.\par Enlarged prostate. Large TURP defect. No retroperitoneal or pelvic adenopathy.\par Indeterminate 1.9 cm cortical lesion in the lower pole left kidney. Could represent complicated cyst or tumor. Findings should be correlated with targeted renal sonography and/or MR.\par No intrathoracic adenopathy.\par Bone lesions T7, T11, L3 and L5 vertebral bodies and right second rib consistent with metastases.\par Staging for prostatic carcinoma at present appears to be T2, N0, M1b.\par \par 1/7/22 FO NGS\par CDK12 K418fs*14 \par \par Microsatellite status - MS-Stable\par Tumor Mutational [place] - 3 Muts/Mb\par Genomic Findings\par For a complete list of the genes assayed, please refer to the Appendix.\par CDK12 K418fs*14\par FGFR3 amplification\par FGFR4 amplification - equivocal†\par MDM2 amplification\par MDM4 amplification - equivocal†\par PIK3CA amplification\par CIC loss\par IGF1R amplification\par IKBKE amplification - equivocal†\par IRS2 amplification - equivocal†\par MYCL1 amplification - equivocal†\par [id number] amplification - equivocal†\par \par 1/26/22 PSMA\par Multiple DCFPyL-avid osseous metastases. [de-identified] : Patient presents to clinic today for f/u with fiance at side. \par Patient states he is feeling well overall. Reports weight gain and good appetite. Denies any other focal bone pain. Normal BMs. \par He reports chronic back pain intermittently, occurs with activity, better than before. Does not require pain medications. \par He denies any hematuria, abdominal pain, n/v/d/c, melena, chest pain, sob, leg swelling or joint  pan .  \par Reports 1 week of epistaxis, which resolved on its own. \par Reports hot flashes 2-3 times a week, which is manageable.

## 2022-04-18 NOTE — DISCUSSION/SUMMARY
[FreeTextEntry1] : Informed patient that he is scheduled for PSMA scan on 1/26/22 @ 230pm  [address].

## 2022-04-18 NOTE — ASSESSMENT
[FreeTextEntry1] : Mr. 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 Dubois. s/p ThuLEP on 12/9/21, path confirmed foci of prostatic adenocarcinoma (Maira's pattern 3+4 total score 7, grade group 2 ) He started Zytiga/prednisone on 12/16/21 Recieved lupron on 12/29/21   Here for f/u after PSMA scan on 1/26/22 showing multiple DCFPyL-avid osseous metastases. Here for f/u\par \par PSA : 0.67 (2/10/22), 78.3 (12/23/21), 58.40 on 9/29/21, 20.66 7/6/21 \par \par 10/18/21 MRI at [facility] \par -5.9 x 6.1 x 7.6 cm; volume 142 mL prostate with PIRADS 5 lesion measuring 1.6 cm Left posterolateral midgland peripheral zone. 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 \par \par 11/8/21 Bone scans  \par Findings: Focal radiotracer activity in the right clavicular head, probable right second rib, probable T6 vertebral body, and left sacrum, suspicious for osseous metastasis. Focal radiotracer activity in the L3 vertebral body, indeterminate. \par Symmetric bilateral renal radiotracer activity. No abnormal focal soft tissue activity. \par Distended urinary bladder with urinary radiotracer activity, obscuring pelvic bones, limiting evaluation. \par Impression: \par Several osseous metastases as described above. \par \par 11/11/21 \par Surgical Pathology Report  \par Final Diagnosis \par 1. Prostate, left anterior apex, biopsy \par -Benign prostate tissue. \par 2. Prostate, left anterior base, biopsy \par -Benign prostate tissue. \par 3. Prostate, right anterior apex, biopsy \par -Benign prostate tissue. \par 4. Prostate, right anterior base, biopsy \par -Benign prostate tissue. \par 5. Prostate, midline apex, biopsy \par -Benign prostate tissue. \par 6. Prostate, midline base, biopsy \par -Benign prostate tissue. \par 7. Prostate, left posterior apex, biopsy \par -Adenocarcinoma of the prostate, Prognostic Grade Group 4 (Maira score 4+4=8) involving 70% (7.5 mm in length) of 1 of 1 core(s). \par 8. Prostate, left posterior base, biopsy \par -Benign prostate tissue. \par 9. Prostate, right posterior apex, biopsy \par -Adenocarcinoma of the prostate, Prognostic Grade Group 1 ([place] score 3+3=6) involving less than 5% (less than 0.5 mm in length) of 1 of 1 core(s). \par 10. Prostate, right posterior base, biopsy \par -Benign prostate tissue. \par 11. Prostate, left lateral, biopsy \par -Benign prostate tissue. \par 12. Prostate, right lateral, biopsy \par -Benign prostate tissue. \par 13. Prostate, left mid PZPL, biopsy \par -Adenocarcinoma of the prostate, Prognostic Grade Group 4 ([place] score 4+4=8) involving 60% (5.5 mm in length) of 1 of 4 core(s). \par -Adenocarcinoma of the prostate, Prognostic Grade Group 1 (Maira score 3+3=6) involving 5% (0.5 mm in length) of 1 of 4 core(s). \par -A total of 2 of 4 cores involved by carcinoma. \par Note: Cribriform Maira pattern 4 is present. \par \par MLH1:   Intact nuclear expression\par MSH2:   Intact nuclear expression\par MSH6:   Intact nuclear expression\par PMS2:   Intact nuclear expression\par \par Interpretation:  No loss of nuclear expression of MMR proteins (MLH1, MSH2, MSH6, and PMS2).   These results show low probability of microsatellite instability-high (MSI-H). There is no evidence of DNA mismatch repair deficiency in the analyzed tissue, indicating there is a low probability for Barrera syndrome (a common cause hereditary non polyposis colorectal cancer or HNPCC).\par \par 12/9/21 s/p laser enucleation of the prostate with morcellator\par Surgical Pathology Report \par Final Diagnosis\par 1. Prostate chips:\par - Benign prostatic hyperplasia and foci of prostatic adenocarcinoma ([place]mount's pattern 3+4 total score 7) grade group 2 involving less than 5% of the submitted tissue\par - Fair[place]'s pattern 4 is less than 5% of the tumor\par 2. Bladder stone:\par - Calculus, gross diagnosis.\par - Specimen submitted for chemical analysis and will be reported separately\par \par 12/15/21 CT ABDOMEN AND PELVIS IC & CT CHEST IC\par Diffusely thick-walled urinary bladder which could be due to cystitis or bladder outlet obstruction. Perivesical fat infiltration suggesting cystitis.\par Enlarged prostate. Large TURP defect. No retroperitoneal or pelvic adenopathy.\par Indeterminate 1.9 cm cortical lesion in the lower pole left kidney. Could represent complicated cyst or tumor. Findings should be correlated with targeted renal sonography and/or MR.\par No intrathoracic adenopathy.\par Bone lesions T7, T11, L3 and L5 vertebral bodies and right second rib consistent with metastases.\par Staging for prostatic carcinoma at present appears to be T2, N0, M1b.\par \par 1/7/22 FO NGS\par CDK12 K418fs*14 \par Microsatellite status - MS-Stable\par Tumor Mutational [place] - 3 Muts/Mb\par Genomic Findings\par For a complete list of the genes assayed, please refer to the Appendix.\par CDK12 K418fs*14\par FGFR3 amplification\par FGFR4 amplification - equivocal†\par MDM2 amplification\par MDM4 amplification - equivocal†\par PIK3CA amplification\par CIC loss\par IGF1R amplification\par IKBKE amplification - equivocal†\par IRS2 amplification - equivocal†\par MYCL1 amplification - equivocal†\par ONC9B1C amplification - equivocal†\par \par 1/26/22 PSMA\par Multiple DCFPyL-avid osseous metastases.\par \par Plan: MARK 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 Indeterminate 1.9 cm cortical lesion in the lower pole left kidney.Bone lesions T7, T11, L3 and L5 vertebral bodies and right second rib\par -PSA trending down 0.23 (3/23/22), 0.67 (2/10/22), 8.95 (1/13/22), 78.3(12/22), 220(12/8/21) \par -PSMA scan on 1/26/22 showing multiple DCFPyL-avid osseous metastases.\par -Received covid vaccine x 2 (August/2021),  s/p booster shot in Feb, 22, 2022. \par - Negative for INVITAE BRCA 1/2 \par -FO NGS with CDK12 K418fs*14 (Lynparza® (Olaparib), will get Invitae and refer for genetic counseling\par - Started zytiga/pred on 12/16. tolerating tx. \par -f/u with Dr. Dubois/Daryl for ADT, Received Lupron on 12/29/21 every 3 months. Due this month. \par -Started Alvarado/Vit D, will start xgeva after dental visit. Has not seen the dentist yet.  \par \par -RTC 4 weeks\par \par Trial data\par The PEACE-1 trial had a 2 × 2 factorial design. Patients with de aishwarya metastatic castration-sensitive prostate cancer (n = 1,173) were randomly assigned 1:1:1:1 to receive the standard of care (n = 296); the standard of care plus abiraterone (n = 292); the standard of care plus radiotherapy (n = 293); or the standard of care plus abiraterone plus radiotherapy (n = 292). All patients had continuous androgen-deprivation therapy. Standard treatments were defined as androgen-deprivation therapy with or without docetaxel. Stratification factors included Eastern Cooperative Oncology Group performance status (0 or 1), metastatic sites (lymph node vs bone vs visceral), type of castration (orchiectomy vs androgen-deprivation therapy), and docetaxel (yes or no).\par \par Disease and demographic factors were well balanced at baseline. At baseline, in the population given androgen-deprivation therapy plus docetaxel (accounting for 710 patients), the median age was 66, and about 78% had a Maira score > 8. About 80% had bone-alone metastasis; 8% had lymph-node–alone metastasis; and about 12% had visceral metastasis. About 36% had low-volume disease, and 64% had high-volume disease.\par \par Co-primary endpoints were radiographic ­progression-free survival and overall survival. Radiographic progression-free survival was significantly improved with the addition of abiraterone acetate plus prednisone to androgen-deprivation therapy plus docetaxel (P < .0001). At a median follow-up of 4.5 years, in patients treated with abiraterone acetate plus prednisone and the standard of care, 139 events were reported; at a median follow-up of 2 years in the standard-of-care-alone arm, there were 211 events.\par \par Looking at radiographic progression-free survival in the population given androgen-deprivation therapy plus docetaxel (with or without radiotherapy), according to disease burden, the addition of abiraterone acetate plus prednisone benefited both groups, but the benefit tended to be greater in the group with high-volume disease. Median radiographic progression-free survival was 4.1 years when abiraterone acetate plus prednisone was added vs 1.6 years for those with high-volume disease who received the standard of care (< .0001), compared with not reached vs a median of 2.7 years in those with low-volume disease (P = .006).\par \par Overall Survival\par \par Follow-up for overall survival was 4.4 years in the overall population; 5.7 years in the control arm of androgen-deprivation therapy alone with or without radiotherapy; and 3.8 years in the control arm of androgen-deprivation therapy plus docetaxel with or without radiotherapy. Overall survival was improved by 25% with the addition of abiraterone acetate plus prednisone to androgen-deprivation therapy plus docetaxel vs the standard of care alone: not reached vs a median of 4.4 years, respectively (P = .017).\par \par A subgroup analysis of overall survival showed a benefit for abiraterone acetate plus prednisone added to androgen-deprivation therapy plus docetaxel in all subgroups, including those stratified by receipt of radiotherapy, performance status, type of castration, and metastatic burden. In patients with high-volume disease, overall survival was improved by 28% with the addition of abiraterone acetate plus prednisone to androgen-deprivation therapy plus docetaxel: median of 5.1 years vs 3.5 years with the standard of care (P = .019). In patients with low-volume disease, overall survival is immature at this point, and medians were not reached in either group.\par \par The overall survival benefit translates to a median lifetime gain of more than 1.5 years for men with high-volume metastases.\par

## 2022-04-18 NOTE — REVIEW OF SYSTEMS
[Recent Change In Weight] : ~T recent weight change [Dysphagia] : no dysphagia [Loss of Hearing] : no loss of hearing [Nosebleeds] : nosebleeds [Hoarseness] : no hoarseness [Odynophagia] : no odynophagia [Mucosal Pain] : no mucosal pain [Constipation] : no constipation [Incontinence] : incontinence [Insomnia] : insomnia [Anxiety] : no anxiety [Hot Flashes] : hot flashes [Negative] : Allergic/Immunologic [FreeTextEntry2] : weight gain  [FreeTextEntry9] : Chronic back pain

## 2022-04-20 ENCOUNTER — APPOINTMENT (OUTPATIENT)
Dept: HEMATOLOGY ONCOLOGY | Facility: CLINIC | Age: 77
End: 2022-04-20

## 2022-05-11 ENCOUNTER — APPOINTMENT (OUTPATIENT)
Dept: INFUSION THERAPY | Facility: CLINIC | Age: 77
End: 2022-05-11

## 2022-05-24 ENCOUNTER — APPOINTMENT (OUTPATIENT)
Dept: INFUSION THERAPY | Facility: CLINIC | Age: 77
End: 2022-05-24

## 2022-05-24 ENCOUNTER — LABORATORY RESULT (OUTPATIENT)
Age: 77
End: 2022-05-24

## 2022-05-24 ENCOUNTER — OUTPATIENT (OUTPATIENT)
Dept: OUTPATIENT SERVICES | Facility: HOSPITAL | Age: 77
LOS: 1 days | End: 2022-05-24
Payer: MEDICARE

## 2022-05-24 ENCOUNTER — APPOINTMENT (OUTPATIENT)
Dept: HEMATOLOGY ONCOLOGY | Facility: CLINIC | Age: 77
End: 2022-05-24
Payer: MEDICARE

## 2022-05-24 VITALS
TEMPERATURE: 98 F | SYSTOLIC BLOOD PRESSURE: 133 MMHG | DIASTOLIC BLOOD PRESSURE: 86 MMHG | RESPIRATION RATE: 18 BRPM | WEIGHT: 160.06 LBS | OXYGEN SATURATION: 99 % | HEART RATE: 56 BPM

## 2022-05-24 VITALS
TEMPERATURE: 98 F | BODY MASS INDEX: 25.82 KG/M2 | OXYGEN SATURATION: 99 % | RESPIRATION RATE: 18 BRPM | DIASTOLIC BLOOD PRESSURE: 86 MMHG | HEART RATE: 86 BPM | SYSTOLIC BLOOD PRESSURE: 133 MMHG | WEIGHT: 160 LBS

## 2022-05-24 DIAGNOSIS — C61 MALIGNANT NEOPLASM OF PROSTATE: ICD-10-CM

## 2022-05-24 LAB
ALBUMIN SERPL ELPH-MCNC: 3.4 G/DL
ALP BLD-CCNC: 97 U/L
ALT SERPL-CCNC: 30 U/L
ANION GAP SERPL CALC-SCNC: 11 MMOL/L
AST SERPL-CCNC: 26 U/L
BILIRUB SERPL-MCNC: 1.3 MG/DL
BUN SERPL-MCNC: 21 MG/DL
CALCIUM SERPL-MCNC: 10.6 MG/DL
CHLORIDE SERPL-SCNC: 104 MMOL/L
CO2 SERPL-SCNC: 28 MMOL/L
CREAT SERPL-MCNC: 1.3 MG/DL
EGFR: 57 ML/MIN/1.73M2
GLUCOSE SERPL-MCNC: 90 MG/DL
POTASSIUM SERPL-SCNC: 4.2 MMOL/L
PROT SERPL-MCNC: 7.3 G/DL
SODIUM SERPL-SCNC: 143 MMOL/L

## 2022-05-24 PROCEDURE — 36415 COLL VENOUS BLD VENIPUNCTURE: CPT

## 2022-05-24 PROCEDURE — 96372 THER/PROPH/DIAG INJ SC/IM: CPT

## 2022-05-24 PROCEDURE — 99214 OFFICE O/P EST MOD 30 MIN: CPT | Mod: 25

## 2022-05-24 RX ORDER — DENOSUMAB 60 MG/ML
120 INJECTION SUBCUTANEOUS ONCE
Refills: 0 | Status: COMPLETED | OUTPATIENT
Start: 2022-05-24 | End: 2022-05-24

## 2022-05-24 RX ADMIN — DENOSUMAB 120 MILLIGRAM(S): 60 INJECTION SUBCUTANEOUS at 14:20

## 2022-05-24 NOTE — HISTORY OF PRESENT ILLNESS
[de-identified] : Mr. 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 Dubois. s/p ThuLEP on 12/9/21, path confirmed foci of prostatic adenocarcinoma (Maira's pattern 3+4 total score 7, grade group 2 ) He started Zytiga/prednisone on 12/16/21  Here for f/u after PSMA scan on 1/26/22 showing multiple DCFPyL-avid osseous metastases. Here for f/u  with Xgeva. \par \par Colonoscopy in 2013, reportedly normal\par FMH- Sister with breast cancer, Half brother with prostate cancer\par \par ONC Hx\par Mr. Ferrer was being evaluated for 20 lb weight loss over 1 year with the inability to gain weight. He underwent CT scans which showed lower pole LEFT kidney 1.8 x 1.6 x 2.0 cm slightly exophytic solid enhancing mass, multiple bilateral simple cysts. markedly enlarged prostate gland with distended bladder and bladder diverticuli. PSA was 20.66 on 7/6/21. He had prostate biopsy with Dr. Melendrez  in 2011 which was negative. He was referred to Dr. Dubois on 9/28/21 for evaluation of kidney mass and elevated PSA. He notices weak stream, occasional staining to intimate stream, urinary frequency, and nocturia x 2.  He repeated PSA on 9/28/21 which was 58.40. He had MRI performed on 10/18/21 which showed 5.9 x 6.1 x 7.6 cm; volume 142 mL prostate with PIRADS 5 lesion measuring 1.6 cm Left posterolateral midgland peripheral zone. 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 Bone scans on 11/8/21 showed several osseous metastases. Dr. Tejada performed a targeted prostate biopsy with the UroNav MRI fusion on 11/11/20, pathology confirmed Adenocarcinoma of the prostate, Prognostic Grade Group 4 (Maira score 4+4=8). He is scheduled for ThuLEP with Dr. Brito on  12/9/21. \par \par 10/18/21 MRI at [place] \par -5.9 x 6.1 x 7.6 cm; volume 142 mL prostate with PIRADS 5 lesion measuring 1.6 cm Left posterolateral midgland peripheral zone. 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 \par \par 11/8/21 Bone scans  \par Findings: Focal radiotracer activity in the right clavicular head, probable right second rib, probable T6 vertebral body, and left sacrum, suspicious for osseous metastasis. Focal radiotracer activity in the L3 vertebral body, indeterminate. \par Symmetric bilateral renal radiotracer activity. No abnormal focal soft tissue activity. \par Distended urinary bladder with urinary radiotracer activity, obscuring pelvic bones, limiting evaluation. \par Impression: \par Several osseous metastases as described above. \par \par 11/11/21 \par Surgical Pathology Report  \par Final Diagnosis \par 1. Prostate, left anterior apex, biopsy \par -Benign prostate tissue. \par 2. Prostate, left anterior base, biopsy \par -Benign prostate tissue. \par 3. Prostate, right anterior apex, biopsy \par -Benign prostate tissue. \par 4. Prostate, right anterior base, biopsy \par -Benign prostate tissue. \par 5. Prostate, midline apex, biopsy \par -Benign prostate tissue. \par 6. Prostate, midline base, biopsy \par -Benign prostate tissue. \par 7. Prostate, left posterior apex, biopsy \par -Adenocarcinoma of the prostate, Prognostic Grade Group 4 ([place] score 4+4=8) involving 70% (7.5 mm in length) of 1 of 1 core(s). \par 8. Prostate, left posterior base, biopsy \par -Benign prostate tissue. \par 9. Prostate, right posterior apex, biopsy \par -Adenocarcinoma of the prostate, Prognostic Grade Group 1 (Maira score 3+3=6) involving less than 5% (less than 0.5 mm in length) of 1 of 1 core(s). \par 10. Prostate, right posterior base, biopsy \par -Benign prostate tissue. \par 11. Prostate, left lateral, biopsy \par -Benign prostate tissue. \par 12. Prostate, right lateral, biopsy \par -Benign prostate tissue. \par 13. Prostate, left mid PZPL, biopsy \par -Adenocarcinoma of the prostate, Prognostic Grade Group 4 (Maira score 4+4=8) involving 60% (5.5 mm in length) of 1 of 4 core(s). \par -Adenocarcinoma of the prostate, Prognostic Grade Group 1 ([place] score 3+3=6) involving 5% (0.5 mm in length) of 1 of 4 core(s). \par -A total of 2 of 4 cores involved by carcinoma. \par Note: Cribriform [place] pattern 4 is present. \par \par MLH1:   Intact nuclear expression\par MSH2:   Intact nuclear expression\par MSH6:   Intact nuclear expression\par PMS2:   Intact nuclear expression\par \par Interpretation:  No loss of nuclear expression of MMR proteins (MLH1, MSH2, MSH6, and PMS2).   These results show low probability of microsatellite\par instability-high (MSI-H). There is no evidence of DNA mismatch repair deficiency in the analyzed tissue, indicating there is a low probability for Barrera syndrome (a\par common cause hereditary non polyposis colorectal cancer or HNPCC).\par \par 12/9/21 s/p laser enucleation of the prostate with morcellator\par Surgical Pathology Report \par Final Diagnosis\par 1. Prostate chips:\par - Benign prostatic hyperplasia and foci of prostatic adenocarcinoma ([place]'s pattern 3+4 total score 7) grade group 2 involving less than 5% of the submitted tissue\par - Maira's pattern 4 is less than 5% of the tumor\par 2. Bladder stone:\par - Calculus, gross diagnosis.\par - Specimen submitted for chemical analysis and will be reported separately\par \par 12/15/21 CT ABDOMEN AND PELVIS IC & CT CHEST IC\par Diffusely thick-walled urinary bladder which could be due to cystitis or bladder outlet obstruction. Perivesical fat infiltration suggesting cystitis.\par Enlarged prostate. Large TURP defect. No retroperitoneal or pelvic adenopathy.\par Indeterminate 1.9 cm cortical lesion in the lower pole left kidney. Could represent complicated cyst or tumor. Findings should be correlated with targeted renal sonography and/or MR.\par No intrathoracic adenopathy.\par Bone lesions T7, T11, L3 and L5 vertebral bodies and right second rib consistent with metastases.\par Staging for prostatic carcinoma at present appears to be T2, N0, M1b.\par \par 1/7/22 FO NGS\par CDK12 K418fs*14 \par \par Microsatellite status - MS-Stable\par Tumor Mutational [place] - 3 Muts/Mb\par Genomic Findings\par For a complete list of the genes assayed, please refer to the Appendix.\par CDK12 K418fs*14\par FGFR3 amplification\par FGFR4 amplification - equivocal†\par MDM2 amplification\par MDM4 amplification - equivocal†\par PIK3CA amplification\par CIC loss\par IGF1R amplification\par IKBKE amplification - equivocal†\par IRS2 amplification - equivocal†\par MYCL1 amplification - equivocal†\par [id number] amplification - equivocal†\par \par 1/26/22 PSMA\par Multiple DCFPyL-avid osseous metastases. [de-identified] : Patient presents to clinic today for f/u. Patient states he is feeling well overall. He reports back of L leg pain which occurs with activity. Does not require pain medications. He denies any hematuria, abdominal pain, n/v/d/c, melena, chest pain, sob, leg swelling or joint pain.\par

## 2022-05-24 NOTE — ASSESSMENT
[FreeTextEntry1] : Mr. 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 Dubois. s/p ThuLEP on 12/9/21, path confirmed foci of prostatic adenocarcinoma (Maira's pattern 3+4 total score 7, grade group 2 ) He started Zytiga/prednisone on 12/16/21 Recieved lupron on 12/29/21   Here for f/u after PSMA scan on 1/26/22 showing multiple DCFPyL-avid osseous metastases. Here for f/u with Xgeva. \par \par PSA : 0.67 (2/10/22), 78.3 (12/23/21), 58.40 on 9/29/21, 20.66 7/6/21 \par \par 10/18/21 MRI at [place] \par -5.9 x 6.1 x 7.6 cm; volume 142 mL prostate with PIRADS 5 lesion measuring 1.6 cm Left posterolateral midgland peripheral zone. 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 \par \par 11/8/21 Bone scans  \par Findings: Focal radiotracer activity in the right clavicular head, probable right second rib, probable T6 vertebral body, and left sacrum, suspicious for osseous metastasis. Focal radiotracer activity in the L3 vertebral body, indeterminate. \par Symmetric bilateral renal radiotracer activity. No abnormal focal soft tissue activity. \par Distended urinary bladder with urinary radiotracer activity, obscuring pelvic bones, limiting evaluation. \par Impression: \par Several osseous metastases as described above. \par \par 11/11/21 \par Surgical Pathology Report  \par Final Diagnosis \par 1. Prostate, left anterior apex, biopsy \par -Benign prostate tissue. \par 2. Prostate, left anterior base, biopsy \par -Benign prostate tissue. \par 3. Prostate, right anterior apex, biopsy \par -Benign prostate tissue. \par 4. Prostate, right anterior base, biopsy \par -Benign prostate tissue. \par 5. Prostate, midline apex, biopsy \par -Benign prostate tissue. \par 6. Prostate, midline base, biopsy \par -Benign prostate tissue. \par 7. Prostate, left posterior apex, biopsy \par -Adenocarcinoma of the prostate, Prognostic Grade Group 4 (Maira score 4+4=8) involving 70% (7.5 mm in length) of 1 of 1 core(s). \par 8. Prostate, left posterior base, biopsy \par -Benign prostate tissue. \par 9. Prostate, right posterior apex, biopsy \par -Adenocarcinoma of the prostate, Prognostic Grade Group 1 ([place] score 3+3=6) involving less than 5% (less than 0.5 mm in length) of 1 of 1 core(s). \par 10. Prostate, right posterior base, biopsy \par -Benign prostate tissue. \par 11. Prostate, left lateral, biopsy \par -Benign prostate tissue. \par 12. Prostate, right lateral, biopsy \par -Benign prostate tissue. \par 13. Prostate, left mid PZPL, biopsy \par -Adenocarcinoma of the prostate, Prognostic Grade Group 4 (Maira score 4+4=8) involving 60% (5.5 mm in length) of 1 of 4 core(s). \par -Adenocarcinoma of the prostate, Prognostic Grade Group 1 (Maira score 3+3=6) involving 5% (0.5 mm in length) of 1 of 4 core(s). \par -A total of 2 of 4 cores involved by carcinoma. \par Note: Cribriform [place] pattern 4 is present. \par \par MLH1:   Intact nuclear expression\par MSH2:   Intact nuclear expression\par MSH6:   Intact nuclear expression\par PMS2:   Intact nuclear expression\par \par Interpretation:  No loss of nuclear expression of MMR proteins (MLH1, MSH2, MSH6, and PMS2).   These results show low probability of microsatellite instability-high (MSI-H). There is no evidence of DNA mismatch repair deficiency in the analyzed tissue, indicating there is a low probability for Barrera syndrome (a common cause hereditary non polyposis colorectal cancer or HNPCC).\par \par 12/9/21 s/p laser enucleation of the prostate with morcellator\par Surgical Pathology Report \par Final Diagnosis\par 1. Prostate chips:\par - Benign prostatic hyperplasia and foci of prostatic adenocarcinoma (Maira's pattern 3+4 total score 7) grade group 2 involving less than 5% of the submitted tissue\par - Maira's pattern 4 is less than 5% of the tumor\par 2. Bladder stone:\par - Calculus, gross diagnosis.\par - Specimen submitted for chemical analysis and will be reported separately\par \par 12/15/21 CT ABDOMEN AND PELVIS IC & CT CHEST IC\par Diffusely thick-walled urinary bladder which could be due to cystitis or bladder outlet obstruction. Perivesical fat infiltration suggesting cystitis.\par Enlarged prostate. Large TURP defect. No retroperitoneal or pelvic adenopathy.\par Indeterminate 1.9 cm cortical lesion in the lower pole left kidney. Could represent complicated cyst or tumor. Findings should be correlated with targeted renal sonography and/or MR.\par No intrathoracic adenopathy.\par Bone lesions T7, T11, L3 and L5 vertebral bodies and right second rib consistent with metastases.\par Staging for prostatic carcinoma at present appears to be T2, N0, M1b.\par \par 1/7/22 FO NGS\par CDK12 K418fs*14 \par Microsatellite status - MS-Stable\par Tumor Mutational [place] - 3 Muts/Mb\par Genomic Findings\par For a complete list of the genes assayed, please refer to the Appendix.\par CDK12 K418fs*14\par FGFR3 amplification\par FGFR4 amplification - equivocal†\par MDM2 amplification\par MDM4 amplification - equivocal†\par PIK3CA amplification\par CIC loss\par IGF1R amplification\par IKBKE amplification - equivocal†\par IRS2 amplification - equivocal†\par MYCL1 amplification - equivocal†\par XOF3Q0X amplification - equivocal†\par \par 1/26/22 PSMA\par Multiple DCFPyL-avid osseous metastases.\par \par Plan: MARK 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 Indeterminate 1.9 cm cortical lesion in the lower pole left kidney.Bone lesions T7, T11, L3 and L5 vertebral bodies and right second rib\par -PSA trending down 0.23 (3/23/22), 0.67 (2/10/22), 8.95 (1/13/22), 78.3(12/22), 220(12/8/21) \par -PSMA scan on 1/26/22 showing multiple DCFPyL-avid osseous metastases.\par -Received covid vaccine x 2 (August/2021),  s/p booster shot in Feb, 22, 2022. \par - Negative for INVITAE BRCA 1/2 \par -FO NGS with CDK12 K418fs*14 (Lynparza® (Olaparib), will get Invitae and refer for genetic counseling\par -Started zytiga/pred on 12/16. tolerating tx. \par -f/u with Dr. Dubois/Daryl for ADT, Received Lupron on 12/29/21 every 3 months. Recent one was 4/6/22. \par Next shot will be in July.  \par -Will scan in the last week of June\par -ct [place]/Vit D,  He got dental clearance. Will start Xgeva(120mg Q 3 months) today.\par \par RTC on 7/6/22.  \par \par Trial data\par The PEACE-1 trial had a 2 × 2 factorial design. Patients with de aishwarya metastatic castration-sensitive prostate cancer (n = 1,173) were randomly assigned 1:1:1:1 to receive the standard of care (n = 296); the standard of care plus abiraterone (n = 292); the standard of care plus radiotherapy (n = 293); or the standard of care plus abiraterone plus radiotherapy (n = 292). All patients had continuous androgen-deprivation therapy. Standard treatments were defined as androgen-deprivation therapy with or without docetaxel. Stratification factors included Eastern Cooperative Oncology Group performance status (0 or 1), metastatic sites (lymph node vs bone vs visceral), type of castration (orchiectomy vs androgen-deprivation therapy), and docetaxel (yes or no).\par \par Disease and demographic factors were well balanced at baseline. At baseline, in the population given androgen-deprivation therapy plus docetaxel (accounting for 710 patients), the median age was 66, and about 78% had a [place] score > 8. About 80% had bone-alone metastasis; 8% had lymph-node–alone metastasis; and about 12% had visceral metastasis. About 36% had low-volume disease, and 64% had high-volume disease.\par \par Co-primary endpoints were radiographic ­progression-free survival and overall survival. Radiographic progression-free survival was significantly improved with the addition of abiraterone acetate plus prednisone to androgen-deprivation therapy plus docetaxel (P < .0001). At a median follow-up of 4.5 years, in patients treated with abiraterone acetate plus prednisone and the standard of care, 139 events were reported; at a median follow-up of 2 years in the standard-of-care-alone arm, there were 211 events.\par \par Looking at radiographic progression-free survival in the population given androgen-deprivation therapy plus docetaxel (with or without radiotherapy), according to disease burden, the addition of abiraterone acetate plus prednisone benefited both groups, but the benefit tended to be greater in the group with high-volume disease. Median radiographic progression-free survival was 4.1 years when abiraterone acetate plus prednisone was added vs 1.6 years for those with high-volume disease who received the standard of care (< .0001), compared with not reached vs a median of 2.7 years in those with low-volume disease (P = .006).\par \par Overall Survival\par \par Follow-up for overall survival was 4.4 years in the overall population; 5.7 years in the control arm of androgen-deprivation therapy alone with or without radiotherapy; and 3.8 years in the control arm of androgen-deprivation therapy plus docetaxel with or without radiotherapy. Overall survival was improved by 25% with the addition of abiraterone acetate plus prednisone to androgen-deprivation therapy plus docetaxel vs the standard of care alone: not reached vs a median of 4.4 years, respectively (P = .017).\par \par A subgroup analysis of overall survival showed a benefit for abiraterone acetate plus prednisone added to androgen-deprivation therapy plus docetaxel in all subgroups, including those stratified by receipt of radiotherapy, performance status, type of castration, and metastatic burden. In patients with high-volume disease, overall survival was improved by 28% with the addition of abiraterone acetate plus prednisone to androgen-deprivation therapy plus docetaxel: median of 5.1 years vs 3.5 years with the standard of care (P = .019). In patients with low-volume disease, overall survival is immature at this point, and medians were not reached in either group.\par \par The overall survival benefit translates to a median lifetime gain of more than 1.5 years for men with high-volume metastases.\par

## 2022-05-24 NOTE — DISCUSSION/SUMMARY
[FreeTextEntry1] : Informed patient that he is scheduled for PSMA scan on 1/26/22 @ 230pm  [address]

## 2022-05-24 NOTE — REVIEW OF SYSTEMS
[Recent Change In Weight] : ~T recent weight change [Insomnia] : insomnia [Muscle Pain] : muscle pain [Negative] : Psychiatric [Dysphagia] : no dysphagia [Loss of Hearing] : no loss of hearing [Nosebleeds] : no nosebleeds [Hoarseness] : no hoarseness [Odynophagia] : no odynophagia [Mucosal Pain] : no mucosal pain [Constipation] : no constipation [Incontinence] : no incontinence [Anxiety] : no anxiety [Hot Flashes] : no hot flashes [FreeTextEntry2] : weight gain  [FreeTextEntry9] : back of L leg pain, denies pain in back

## 2022-05-25 LAB
PSA FREE FLD-MCNC: 47 %
PSA FREE SERPL-MCNC: 0.04 NG/ML
PSA SERPL-MCNC: 0.09 NG/ML

## 2022-05-31 LAB
TESTOST FREE SERPL-MCNC: 0.1 PG/ML
TESTOST SERPL-MCNC: <2.5 NG/DL

## 2022-06-30 ENCOUNTER — OUTPATIENT (OUTPATIENT)
Dept: OUTPATIENT SERVICES | Facility: HOSPITAL | Age: 77
LOS: 1 days | End: 2022-06-30
Payer: MEDICARE

## 2022-06-30 ENCOUNTER — APPOINTMENT (OUTPATIENT)
Dept: CT IMAGING | Facility: HOSPITAL | Age: 77
End: 2022-06-30

## 2022-06-30 ENCOUNTER — RESULT REVIEW (OUTPATIENT)
Age: 77
End: 2022-06-30

## 2022-06-30 LAB — POCT ISTAT CREATININE: 1.1 MG/DL — SIGNIFICANT CHANGE UP (ref 0.5–1.3)

## 2022-06-30 PROCEDURE — 74177 CT ABD & PELVIS W/CONTRAST: CPT | Mod: 26,MH

## 2022-06-30 PROCEDURE — 71260 CT THORAX DX C+: CPT | Mod: 26,MH

## 2022-06-30 PROCEDURE — 71260 CT THORAX DX C+: CPT

## 2022-06-30 PROCEDURE — 82565 ASSAY OF CREATININE: CPT

## 2022-06-30 PROCEDURE — 74177 CT ABD & PELVIS W/CONTRAST: CPT

## 2022-07-06 ENCOUNTER — APPOINTMENT (OUTPATIENT)
Dept: UROLOGY | Facility: CLINIC | Age: 77
End: 2022-07-06

## 2022-07-06 ENCOUNTER — APPOINTMENT (OUTPATIENT)
Dept: HEMATOLOGY ONCOLOGY | Facility: CLINIC | Age: 77
End: 2022-07-06

## 2022-07-06 VITALS
HEIGHT: 66 IN | DIASTOLIC BLOOD PRESSURE: 87 MMHG | HEART RATE: 82 BPM | BODY MASS INDEX: 25.71 KG/M2 | RESPIRATION RATE: 18 BRPM | WEIGHT: 160 LBS | OXYGEN SATURATION: 99 % | SYSTOLIC BLOOD PRESSURE: 144 MMHG | TEMPERATURE: 96.8 F

## 2022-07-06 VITALS
BODY MASS INDEX: 25.23 KG/M2 | WEIGHT: 157 LBS | DIASTOLIC BLOOD PRESSURE: 83 MMHG | RESPIRATION RATE: 18 BRPM | SYSTOLIC BLOOD PRESSURE: 144 MMHG | TEMPERATURE: 97.8 F | HEART RATE: 92 BPM | HEIGHT: 66 IN

## 2022-07-06 LAB
ALBUMIN SERPL ELPH-MCNC: 4.2 G/DL
ALP BLD-CCNC: 66 U/L
ALT SERPL-CCNC: 23 U/L
ANION GAP SERPL CALC-SCNC: 11 MMOL/L
AST SERPL-CCNC: 23 U/L
BASOPHILS # BLD AUTO: 0.03 K/UL
BASOPHILS NFR BLD AUTO: 0.4 %
BILIRUB SERPL-MCNC: 0.5 MG/DL
BILIRUB UR QL STRIP: NORMAL
BUN SERPL-MCNC: 18 MG/DL
CALCIUM SERPL-MCNC: 9.7 MG/DL
CHLORIDE SERPL-SCNC: 108 MMOL/L
CLARITY UR: CLEAR
CO2 SERPL-SCNC: 23 MMOL/L
COLLECTION METHOD: NORMAL
CREAT SERPL-MCNC: 1.01 MG/DL
EGFR: 77 ML/MIN/1.73M2
EOSINOPHIL # BLD AUTO: 0.02 K/UL
EOSINOPHIL NFR BLD AUTO: 0.2 %
GLUCOSE SERPL-MCNC: 101 MG/DL
GLUCOSE UR-MCNC: NORMAL
HCG UR QL: 0.2 EU/DL
HCT VFR BLD CALC: 40.4 %
HGB BLD-MCNC: 13.2 G/DL
HGB UR QL STRIP.AUTO: NORMAL
IMM GRANULOCYTES NFR BLD AUTO: 0.7 %
KETONES UR-MCNC: NORMAL
LEUKOCYTE ESTERASE UR QL STRIP: NORMAL
LYMPHOCYTES # BLD AUTO: 2.83 K/UL
LYMPHOCYTES NFR BLD AUTO: 33.1 %
MAN DIFF?: NORMAL
MCHC RBC-ENTMCNC: 31 PG
MCHC RBC-ENTMCNC: 32.7 GM/DL
MCV RBC AUTO: 94.8 FL
MONOCYTES # BLD AUTO: 0.6 K/UL
MONOCYTES NFR BLD AUTO: 7 %
NEUTROPHILS # BLD AUTO: 5.02 K/UL
NEUTROPHILS NFR BLD AUTO: 58.6 %
NITRITE UR QL STRIP: NORMAL
PH UR STRIP: 5.5
PLATELET # BLD AUTO: 215 K/UL
POTASSIUM SERPL-SCNC: 4.4 MMOL/L
PROT SERPL-MCNC: 7.3 G/DL
PROT UR STRIP-MCNC: NORMAL
RBC # BLD: 4.26 M/UL
RBC # FLD: 13.7 %
SODIUM SERPL-SCNC: 142 MMOL/L
SP GR UR STRIP: 1.03
WBC # FLD AUTO: 8.56 K/UL

## 2022-07-06 PROCEDURE — 99214 OFFICE O/P EST MOD 30 MIN: CPT | Mod: 25

## 2022-07-06 PROCEDURE — 99214 OFFICE O/P EST MOD 30 MIN: CPT

## 2022-07-06 PROCEDURE — 96402 CHEMO HORMON ANTINEOPL SQ/IM: CPT

## 2022-07-06 PROCEDURE — 81003 URINALYSIS AUTO W/O SCOPE: CPT | Mod: QW

## 2022-07-06 RX ORDER — AMLODIPINE BESYLATE 5 MG/1
5 TABLET ORAL
Qty: 90 | Refills: 0 | Status: DISCONTINUED | COMMUNITY
Start: 2021-09-23 | End: 2022-07-06

## 2022-07-06 RX ADMIN — LEUPROLIDE ACETATE 1 MG: KIT at 00:00

## 2022-07-06 NOTE — ASSESSMENT
[FreeTextEntry1] : 76 year old man with metastatic prostate cancer to bone with multiple sites, 140 cc prostate with chronic LUTS and recent urinary retention s/p ThuLEP 12/9/21. Passed void trial POD 1. Pathology with 91 grams of prostate tissue with [place] 3+4 prostate cancer in less than 5% of tissue. He is on abiraterone/prednisone and lupron every 3 months. He received lupron injection today. He is taking calcium/Vit D supplement. Doing well with strong stream, complete emptying, no stress leakage and no hematuria. Rare urge incontinence if he holds bladder for too long. Discussed medication treatment, however, he is not interested at this time. CT A/P 6/30 with stable osseous lesions, possibly sclerotic from treatment. Stable 1.9 cm left renal mass, hemorrhagic cyst vs solid mass. Surveillance is reasonable.\par  - F/U in 3 months for lupron injection\par  - F/U with Oncology for continued management of metastatic prostate cancer.

## 2022-07-06 NOTE — REVIEW OF SYSTEMS
[Recent Change In Weight] : ~T recent weight change [Negative] : Allergic/Immunologic [Hot Flashes] : hot flashes [Dysphagia] : no dysphagia [Loss of Hearing] : no loss of hearing [Nosebleeds] : no nosebleeds [Hoarseness] : no hoarseness [Odynophagia] : no odynophagia [Mucosal Pain] : no mucosal pain [Constipation] : no constipation [Incontinence] : no incontinence [Muscle Pain] : no muscle pain [Insomnia] : no insomnia [Anxiety] : no anxiety [FreeTextEntry2] : weight gain

## 2022-07-06 NOTE — HISTORY OF PRESENT ILLNESS
[de-identified] : Mr. 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 Dubois. s/p ThuLEP on 12/9/21, path confirmed foci of prostatic adenocarcinoma (Glady's pattern 3+4 total score 7, grade group 2 ) He started Zytiga/prednisone on 12/16/21  Here for f/u after PSMA scan on 1/26/22 showing multiple DCFPyL-avid osseous metastases. Started Xgeva(120mg Q 3ms) on 5/24/22. Today here for f/u after CT scans\par \par Colonoscopy in 2013, reportedly normal\par FMH- Sister with breast cancer, Half brother with prostate cancer\par \par ONC Hx\par Mr. Ferrer was being evaluated for 20 lb weight loss over 1 year with the inability to gain weight. He underwent CT scans which showed lower pole LEFT kidney 1.8 x 1.6 x 2.0 cm slightly exophytic solid enhancing mass, multiple bilateral simple cysts. markedly enlarged prostate gland with distended bladder and bladder diverticuli. PSA was 20.66 on 7/6/21. He had prostate biopsy with Dr. Melendrez  in 2011 which was negative. He was referred to Dr. Dubois on 9/28/21 for evaluation of kidney mass and elevated PSA. He notices weak stream, occasional staining to intimate stream, urinary frequency, and nocturia x 2.  He repeated PSA on 9/28/21 which was 58.40. He had MRI performed on 10/18/21 which showed 5.9 x 6.1 x 7.6 cm; volume 142 mL prostate with PIRADS 5 lesion measuring 1.6 cm Left posterolateral midgland peripheral zone. 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 Bone scans on 11/8/21 showed several osseous metastases. Dr. Tejada performed a targeted prostate biopsy with the UroNav MRI fusion on 11/11/20, pathology confirmed Adenocarcinoma of the prostate, Prognostic Grade Group 4 (Maira score 4+4=8). He is scheduled for ThuLEP with Dr. Brito on  12/9/21. \par \par 10/18/21 MRI at [place] \par -5.9 x 6.1 x 7.6 cm; volume 142 mL prostate with PIRADS 5 lesion measuring 1.6 cm Left posterolateral midgland peripheral zone. 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 \par \par 11/8/21 Bone scans  \par Findings: Focal radiotracer activity in the right clavicular head, probable right second rib, probable T6 vertebral body, and left sacrum, suspicious for osseous metastasis. Focal radiotracer activity in the L3 vertebral body, indeterminate. \par Symmetric bilateral renal radiotracer activity. No abnormal focal soft tissue activity. \par Distended urinary bladder with urinary radiotracer activity, obscuring pelvic bones, limiting evaluation. \par Impression: \par Several osseous metastases as described above. \par \par 11/11/21 \par Surgical Pathology Report  \par Final Diagnosis \par 1. Prostate, left anterior apex, biopsy \par -Benign prostate tissue. \par 2. Prostate, left anterior base, biopsy \par -Benign prostate tissue. \par 3. Prostate, right anterior apex, biopsy \par -Benign prostate tissue. \par 4. Prostate, right anterior base, biopsy \par -Benign prostate tissue. \par 5. Prostate, midline apex, biopsy \par -Benign prostate tissue. \par 6. Prostate, midline base, biopsy \par -Benign prostate tissue. \par 7. Prostate, left posterior apex, biopsy \par -Adenocarcinoma of the prostate, Prognostic Grade Group 4 ([place] score 4+4=8) involving 70% (7.5 mm in length) of 1 of 1 core(s). \par 8. Prostate, left posterior base, biopsy \par -Benign prostate tissue. \par 9. Prostate, right posterior apex, biopsy \par -Adenocarcinoma of the prostate, Prognostic Grade Group 1 (Gl[place] score 3+3=6) involving less than 5% (less than 0.5 mm in length) of 1 of 1 core(s). \par 10. Prostate, right posterior base, biopsy \par -Benign prostate tissue. \par 11. Prostate, left lateral, biopsy \par -Benign prostate tissue. \par 12. Prostate, right lateral, biopsy \par -Benign prostate tissue. \par 13. Prostate, left mid PZPL, biopsy \par -Adenocarcinoma of the prostate, Prognostic Grade Group 4 (Maira score 4+4=8) involving 60% (5.5 mm in length) of 1 of 4 core(s). \par -Adenocarcinoma of the prostate, Prognostic Grade Group 1 (Maira score 3+3=6) involving 5% (0.5 mm in length) of 1 of 4 core(s). \par -A total of 2 of 4 cores involved by carcinoma. \par Note: Cribriform Maira pattern 4 is present. \par \par MLH1:   Intact nuclear expression\par MSH2:   Intact nuclear expression\par MSH6:   Intact nuclear expression\par PMS2:   Intact nuclear expression\par \par Interpretation:  No loss of nuclear expression of MMR proteins (MLH1, MSH2, MSH6, and PMS2).   These results show low probability of microsatellite\par instability-high (MSI-H). There is no evidence of DNA mismatch repair deficiency in the analyzed tissue, indicating there is a low probability for Barrera syndrome (a\par common cause hereditary non polyposis colorectal cancer or HNPCC).\par \par 12/9/21 s/p laser enucleation of the prostate with morcellator\par Surgical Pathology Report \par Final Diagnosis\par 1. Prostate chips:\par - Benign prostatic hyperplasia and foci of prostatic adenocarcinoma (Glady's pattern 3+4 total score 7) grade group 2 involving less than 5% of the submitted tissue\par - Maira's pattern 4 is less than 5% of the tumor\par 2. Bladder stone:\par - Calculus, gross diagnosis.\par - Specimen submitted for chemical analysis and will be reported separately\par \par 12/15/21 CT ABDOMEN AND PELVIS IC & CT CHEST IC\par Diffusely thick-walled urinary bladder which could be due to cystitis or bladder outlet obstruction. Perivesical fat infiltration suggesting cystitis.\par Enlarged prostate. Large TURP defect. No retroperitoneal or pelvic adenopathy.\par Indeterminate 1.9 cm cortical lesion in the lower pole left kidney. Could represent complicated cyst or tumor. Findings should be correlated with targeted renal sonography and/or MR.\par No intrathoracic adenopathy.\par Bone lesions T7, T11, L3 and L5 vertebral bodies and right second rib consistent with metastases.\par Staging for prostatic carcinoma at present appears to be T2, N0, M1b.\par \par 1/7/22 FO NGS\par CDK12 K418fs*14 \par \par Microsatellite status - MS-Stable\par Tumor Mutational [place] - 3 Muts/Mb\par Genomic Findings\par For a complete list of the genes assayed, please refer to the Appendix.\par CDK12 K418fs*14\par FGFR3 amplification\par FGFR4 amplification - equivocal†\par MDM2 amplification\par MDM4 amplification - equivocal†\par PIK3CA amplification\par CIC loss\par IGF1R amplification\par IKBKE amplification - equivocal†\par IRS2 amplification - equivocal†\par MYCL1 amplification - equivocal†\par DQI7E2C amplification - equivocal†\par \par 1/26/22 PSMA\par Multiple DCFPyL-avid osseous metastases.\par \par 6/30/22 CT CAP\par 1.Since 12/15/2021, decreased prostate size.\par 2. No change in the extent of osseous metastatic disease, though several of the bone metastases have become more sclerotic, which could reflect posttreatment change.\par 3. No change in size of a 1.9 cm left renal mass which is again more dense than a simple cyst. It could represent a hemorrhagic cyst or solid mass. Either continued follow-up recommended or definitive characterization with dedicated imaging.\par  [de-identified] : Patient presents to clinic today for f/u. He is feeling well.  mild fatigue. Reports intermittent hot flashes. He denies any hematuria, abdominal pain, n/v/d/c, melena, chest pain, sob, leg swelling or joint pain.  No dental concerns\par

## 2022-07-06 NOTE — ASSESSMENT
[FreeTextEntry1] : Mr. 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 Dubois. s/p ThuLEP on 12/9/21, path confirmed foci of prostatic adenocarcinoma (Maira's pattern 3+4 total score 7, grade group 2 ) He started Zytiga/prednisone on 12/16/21 Recieved lupron on 12/29/21   Here for f/u after PSMA scan on 1/26/22 showing multiple DCFPyL-avid osseous metastases. Started Xgeva(120mg Q 3ms) on 5/24/22. Today here for f/u after CT scans\par \par 6/30/22 CT CAP\par 1.Since 12/15/2021, decreased prostate size.\par 2. No change in the extent of osseous metastatic disease, though several of the bone metastases have become more sclerotic, which could reflect posttreatment change.\par 3. No change in size of a 1.9 cm left renal mass which is again more dense than a simple cyst. It could represent a hemorrhagic cyst or solid mass. 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  Started zytiga/pred on 12/16/21. \par -- discussed CT scan results showing response to treatment\par --PSA pending today but as of last check 5/2022 was suppressed\par --he is tolerating abiraterone/prednisone + ADT with mild side effects (fatigue, hot flashes).  continue this therapy. \par --follows with urology for depot leuprolide\par \par # Bone metastases\par --receiving xgeva every 3 months - last 5/24/22. Next shot will be on 8/16/22. cleared for this, pending labs.\par \par return to clinic in November prior to xgeva with CBC, CMP, PSA\par anticipate next scans after a 6 month interval (1/2023) barring rise in PSA or new symptoms in the interim.

## 2022-07-06 NOTE — HISTORY OF PRESENT ILLNESS
[FreeTextEntry1] : 11/23/21: 76 year old man with BPH, history of urinary retention and newly diagnosed metastatic prostate cancer.\par \par Regarding prostate cancer, he had MRI at [facility] on 10/18/2021. 5.9 x 6.1 x 7.6 cm; volume 142 mL prostate with PIRADS 5 lesion measuring 1.6 cm Left posterolateral midgland peripheral zone. 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 The standard biopsy detected GG4 cancer which did overlap with the targeted biopsy. 2/12 cores. location(s). LPA GG4, RPA GG1\par \par \par Bone scan positive at multiple sites. He is scheduled to see Dr. Reed to discuss treatment for metastatic prostate cancer. He has not started ADT yet.\par \par He has long history of LUTS as well. He was taking saw palmetto for a long time. His symptoms are primarily weak stream, straining, urinary frequency, nocturia and need to defecate when urinating. He has elevated PVRs, up to 600 cc in the office. He went into urinary retention after prostate biopsy, but is now voiding. He started flomax and finasteride recently and has noticed improved urinary stream with those medications. No fevers, chills, dysuria, hematuria.\par \par 12/9/21: ThuLEP, procedure uncomplicated. Passed void trial POD 1.\par \par pathology: 91 grams, [place] 3+4 prostate cancer involving less than 5% of the tissue\par \par 12/29/21: Doing well. Voiding with strong stream, complete emptying, decreased frequency, urgency. No leakage. No hematuria. Started abiraterone/prednisone with Dr. Reed.\par \par IPSS 4, QOL 1, PVR 32\par \par 4/6/22: Doing well. Strong stream, complete emptying. No hematuria. Not wearing any pads. No stress incontinence. He does have occasional urge with small amount of leakage if he tries to hold for too long. This is infrequent. No hot flashes or bone pain. Remains on abiraterone/prednisone. Received lupron injection today.\par \par IPSS 7, QOL 2, PVR 12\par \par Uroflow: Voided volume 95.1, Qmax 24.5\par \par 7/6/22: Doing well. Symptoms unchanged from prior. Remains on lupron, zytiga and xgeva. CT A/P with no change in extent of osseous metastases, though lesions have become more sclerotic. There is a stable 1.9 cm left renal mass, which either represents a hemorrhagic cyst or solid mass.\par \par PSA 7/6/21: 20.66 ng/ml; 9/28/21--58.4; 12/8/21--220; 12/22/21--78.3; 1/12/22--8.95; 2/9/22--0.67; 3/23/22--0.23; 5/25/22--0.09\par \par \par \par

## 2022-07-07 ENCOUNTER — MED ADMIN CHARGE (OUTPATIENT)
Age: 77
End: 2022-07-07

## 2022-07-07 LAB
PSA FREE FLD-MCNC: 32 %
PSA FREE SERPL-MCNC: 0.02 NG/ML
PSA SERPL-MCNC: 0.06 NG/ML

## 2022-07-07 RX ORDER — LEUPROLIDE ACETATE 22.5 MG
22.5 KIT INTRAMUSCULAR
Qty: 1 | Refills: 0 | Status: COMPLETED | OUTPATIENT
Start: 2022-07-06

## 2022-07-11 LAB
TESTOST FREE SERPL-MCNC: 0.1 PG/ML
TESTOST SERPL-MCNC: <2.5 NG/DL

## 2022-07-18 ENCOUNTER — APPOINTMENT (OUTPATIENT)
Dept: HEART AND VASCULAR | Facility: CLINIC | Age: 77
End: 2022-07-18

## 2022-07-18 VITALS
HEIGHT: 66 IN | HEART RATE: 90 BPM | WEIGHT: 159 LBS | OXYGEN SATURATION: 95 % | SYSTOLIC BLOOD PRESSURE: 140 MMHG | BODY MASS INDEX: 25.55 KG/M2 | RESPIRATION RATE: 16 BRPM | TEMPERATURE: 96 F | DIASTOLIC BLOOD PRESSURE: 70 MMHG

## 2022-07-18 DIAGNOSIS — Z00.00 ENCOUNTER FOR GENERAL ADULT MEDICAL EXAMINATION W/OUT ABNORMAL FINDINGS: ICD-10-CM

## 2022-07-18 PROCEDURE — 99203 OFFICE O/P NEW LOW 30 MIN: CPT

## 2022-07-18 PROCEDURE — 36415 COLL VENOUS BLD VENIPUNCTURE: CPT

## 2022-07-18 PROCEDURE — 93000 ELECTROCARDIOGRAM COMPLETE: CPT

## 2022-07-18 NOTE — DISCUSSION/SUMMARY
[FreeTextEntry1] : stable exam, labs in office\par HTN- stable on meds\par Lipids- stable on statin [EKG obtained to assist in diagnosis and management of assessed problem(s)] : EKG obtained to assist in diagnosis and management of assessed problem(s)

## 2022-07-19 LAB
ALBUMIN SERPL ELPH-MCNC: 4.7 G/DL
ALP BLD-CCNC: 72 U/L
ALT SERPL-CCNC: 22 U/L
ANION GAP SERPL CALC-SCNC: 13 MMOL/L
AST SERPL-CCNC: 21 U/L
BASOPHILS # BLD AUTO: 0.05 K/UL
BASOPHILS NFR BLD AUTO: 0.5 %
BILIRUB SERPL-MCNC: 0.7 MG/DL
BUN SERPL-MCNC: 20 MG/DL
CALCIUM SERPL-MCNC: 10.4 MG/DL
CHLORIDE SERPL-SCNC: 103 MMOL/L
CHOLEST SERPL-MCNC: 221 MG/DL
CO2 SERPL-SCNC: 26 MMOL/L
CREAT SERPL-MCNC: 1.23 MG/DL
EGFR: 61 ML/MIN/1.73M2
EOSINOPHIL # BLD AUTO: 0.03 K/UL
EOSINOPHIL NFR BLD AUTO: 0.3 %
ESTIMATED AVERAGE GLUCOSE: 140 MG/DL
GLUCOSE SERPL-MCNC: 91 MG/DL
HBA1C MFR BLD HPLC: 6.5 %
HCT VFR BLD CALC: 43.7 %
HDLC SERPL-MCNC: 94 MG/DL
HGB BLD-MCNC: 13.5 G/DL
IMM GRANULOCYTES NFR BLD AUTO: 0.8 %
LDLC SERPL CALC-MCNC: 114 MG/DL
LYMPHOCYTES # BLD AUTO: 4.51 K/UL
LYMPHOCYTES NFR BLD AUTO: 45.9 %
MAN DIFF?: NORMAL
MCHC RBC-ENTMCNC: 30.4 PG
MCHC RBC-ENTMCNC: 30.9 GM/DL
MCV RBC AUTO: 98.4 FL
MONOCYTES # BLD AUTO: 0.72 K/UL
MONOCYTES NFR BLD AUTO: 7.3 %
NEUTROPHILS # BLD AUTO: 4.43 K/UL
NEUTROPHILS NFR BLD AUTO: 45.2 %
NONHDLC SERPL-MCNC: 127 MG/DL
PLATELET # BLD AUTO: 210 K/UL
POTASSIUM SERPL-SCNC: 4.5 MMOL/L
PROT SERPL-MCNC: 7.2 G/DL
RBC # BLD: 4.44 M/UL
RBC # FLD: 14.2 %
SODIUM SERPL-SCNC: 142 MMOL/L
TRIGL SERPL-MCNC: 65 MG/DL
TSH SERPL-ACNC: 0.76 UIU/ML
WBC # FLD AUTO: 9.82 K/UL

## 2022-08-03 ENCOUNTER — APPOINTMENT (OUTPATIENT)
Dept: INFUSION THERAPY | Facility: CLINIC | Age: 77
End: 2022-08-03

## 2022-08-16 ENCOUNTER — OUTPATIENT (OUTPATIENT)
Dept: OUTPATIENT SERVICES | Facility: HOSPITAL | Age: 77
LOS: 1 days | End: 2022-08-16
Payer: MEDICARE

## 2022-08-16 ENCOUNTER — APPOINTMENT (OUTPATIENT)
Dept: INFUSION THERAPY | Facility: CLINIC | Age: 77
End: 2022-08-16

## 2022-08-16 VITALS
DIASTOLIC BLOOD PRESSURE: 78 MMHG | RESPIRATION RATE: 16 BRPM | WEIGHT: 160.06 LBS | HEART RATE: 78 BPM | SYSTOLIC BLOOD PRESSURE: 156 MMHG | OXYGEN SATURATION: 100 % | HEIGHT: 66 IN | TEMPERATURE: 98 F

## 2022-08-16 DIAGNOSIS — C61 MALIGNANT NEOPLASM OF PROSTATE: ICD-10-CM

## 2022-08-16 PROCEDURE — 96372 THER/PROPH/DIAG INJ SC/IM: CPT

## 2022-08-16 RX ORDER — DENOSUMAB 60 MG/ML
120 INJECTION SUBCUTANEOUS ONCE
Refills: 0 | Status: COMPLETED | OUTPATIENT
Start: 2022-08-16 | End: 2022-08-16

## 2022-08-16 RX ADMIN — DENOSUMAB 120 MILLIGRAM(S): 60 INJECTION SUBCUTANEOUS at 14:46

## 2022-10-05 ENCOUNTER — APPOINTMENT (OUTPATIENT)
Dept: UROLOGY | Facility: CLINIC | Age: 77
End: 2022-10-05

## 2022-10-05 VITALS
HEIGHT: 66 IN | BODY MASS INDEX: 25.71 KG/M2 | HEART RATE: 109 BPM | SYSTOLIC BLOOD PRESSURE: 146 MMHG | DIASTOLIC BLOOD PRESSURE: 87 MMHG | WEIGHT: 160 LBS | TEMPERATURE: 98 F

## 2022-10-05 LAB
BILIRUB UR QL STRIP: NORMAL
CLARITY UR: CLEAR
COLLECTION METHOD: NORMAL
GLUCOSE UR-MCNC: NORMAL
HCG UR QL: 0.2 EU/DL
HGB UR QL STRIP.AUTO: NORMAL
KETONES UR-MCNC: NORMAL
LEUKOCYTE ESTERASE UR QL STRIP: NORMAL
NITRITE UR QL STRIP: NORMAL
PH UR STRIP: 5.5
PROT UR STRIP-MCNC: NORMAL
SP GR UR STRIP: 1.01

## 2022-10-05 PROCEDURE — 99214 OFFICE O/P EST MOD 30 MIN: CPT | Mod: 25

## 2022-10-05 PROCEDURE — 96402 CHEMO HORMON ANTINEOPL SQ/IM: CPT

## 2022-10-05 RX ORDER — LEUPROLIDE ACETATE 22.5 MG
22.5 KIT INTRAMUSCULAR
Qty: 1 | Refills: 0 | Status: COMPLETED | OUTPATIENT
Start: 2022-10-05

## 2022-10-05 RX ADMIN — LEUPROLIDE ACETATE 1 MG: KIT at 00:00

## 2022-10-05 NOTE — ASSESSMENT
[FreeTextEntry1] : 76 year old man with metastatic prostate cancer to bone with multiple sites, 140 cc prostate with chronic LUTS and recent urinary retention s/p ThuLEP 12/9/21. Passed void trial POD 1. Pathology with 91 grams of prostate tissue with [place] 3+4 prostate cancer in less than 5% of tissue. He is on abiraterone/prednisone and lupron every 3 months. He received lupron injection today. He is taking calcium/Vit D supplement. Doing well with strong stream, complete emptying, no stress leakage and no hematuria. CT A/P 6/30 with stable osseous lesions, possibly sclerotic from treatment. Stable 1.9 cm left renal mass, hemorrhagic cyst vs solid mass. Surveillance is reasonable.\par  - F/U in 3 months for lupron injection\par  - F/U with Oncology for continued management of metastatic prostate cancer.

## 2022-10-05 NOTE — HISTORY OF PRESENT ILLNESS
[FreeTextEntry1] : 11/23/21: 76 year old man with BPH, history of urinary retention and newly diagnosed metastatic prostate cancer.\par \par Regarding prostate cancer, he had MRI at [facility] on 10/18/2021. 5.9 x 6.1 x 7.6 cm; volume 142 mL prostate with PIRADS 5 lesion measuring 1.6 cm Left posterolateral midgland peripheral zone. 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 The standard biopsy detected GG4 cancer which did overlap with the targeted biopsy. 2/12 cores. location(s). LPA GG4, RPA GG1\par \par \par Bone scan positive at multiple sites. He is scheduled to see Dr. Reed to discuss treatment for metastatic prostate cancer. He has not started ADT yet.\par \par He has long history of LUTS as well. He was taking saw palmetto for a long time. His symptoms are primarily weak stream, straining, urinary frequency, nocturia and need to defecate when urinating. He has elevated PVRs, up to 600 cc in the office. He went into urinary retention after prostate biopsy, but is now voiding. He started flomax and finasteride recently and has noticed improved urinary stream with those medications. No fevers, chills, dysuria, hematuria.\par \par 12/9/21: ThuLEP, procedure uncomplicated. Passed void trial POD 1.\par \par pathology: 91 grams, [place] 3+4 prostate cancer involving less than 5% of the tissue\par \par 12/29/21: Doing well. Voiding with strong stream, complete emptying, decreased frequency, urgency. No leakage. No hematuria. Started abiraterone/prednisone with Dr. Reed.\par \par IPSS 4, QOL 1, PVR 32\par \par 4/6/22: Doing well. Strong stream, complete emptying. No hematuria. Not wearing any pads. No stress incontinence. He does have occasional urge with small amount of leakage if he tries to hold for too long. This is infrequent. No hot flashes or bone pain. Remains on abiraterone/prednisone. Received lupron injection today.\par \par IPSS 7, QOL 2, PVR 12\par \par Uroflow: Voided volume 95.1, Qmax 24.5\par \par 7/6/22: Doing well. Symptoms unchanged from prior. Remains on lupron, zytiga and xgeva. CT A/P with no change in extent of osseous metastases, though lesions have become more sclerotic. There is a stable 1.9 cm left renal mass, which either represents a hemorrhagic cyst or solid mass.\par \par \par 10/5/22: Doing well. Urinary symptoms stable. Remains on lupron, zytiga and xgeva. No new complaints. Voiding with strong stream, complete emptying, no leakage.\par \par IPSS 5, QOL 2, PVR 24 cc\par \par PSA 7/6/21: 20.66 ng/ml; 9/28/21--58.4; 12/8/21--220; 12/22/21--78.3; 1/12/22--8.95; 2/9/22--0.67; 3/23/22--0.23; 5/25/22--0.09; 7/6/22--0.06\par \par \par \par \par

## 2022-11-08 ENCOUNTER — APPOINTMENT (OUTPATIENT)
Dept: INFUSION THERAPY | Facility: CLINIC | Age: 77
End: 2022-11-08

## 2022-11-08 ENCOUNTER — OUTPATIENT (OUTPATIENT)
Dept: OUTPATIENT SERVICES | Facility: HOSPITAL | Age: 77
LOS: 1 days | End: 2022-11-08
Payer: MEDICARE

## 2022-11-08 ENCOUNTER — LABORATORY RESULT (OUTPATIENT)
Age: 77
End: 2022-11-08

## 2022-11-08 ENCOUNTER — APPOINTMENT (OUTPATIENT)
Dept: HEMATOLOGY ONCOLOGY | Facility: CLINIC | Age: 77
End: 2022-11-08

## 2022-11-08 VITALS
WEIGHT: 165 LBS | DIASTOLIC BLOOD PRESSURE: 87 MMHG | HEART RATE: 87 BPM | SYSTOLIC BLOOD PRESSURE: 155 MMHG | OXYGEN SATURATION: 100 % | BODY MASS INDEX: 26.52 KG/M2 | HEIGHT: 66 IN | TEMPERATURE: 97.6 F | RESPIRATION RATE: 18 BRPM

## 2022-11-08 VITALS
DIASTOLIC BLOOD PRESSURE: 87 MMHG | WEIGHT: 164.91 LBS | RESPIRATION RATE: 18 BRPM | TEMPERATURE: 98 F | HEIGHT: 66 IN | HEART RATE: 87 BPM | SYSTOLIC BLOOD PRESSURE: 155 MMHG | OXYGEN SATURATION: 100 %

## 2022-11-08 DIAGNOSIS — E55.9 VITAMIN D DEFICIENCY, UNSPECIFIED: ICD-10-CM

## 2022-11-08 DIAGNOSIS — C61 MALIGNANT NEOPLASM OF PROSTATE: ICD-10-CM

## 2022-11-08 LAB
ALBUMIN SERPL ELPH-MCNC: 3.4 G/DL
ALP BLD-CCNC: 61 U/L
ALT SERPL-CCNC: 30 U/L
ANION GAP SERPL CALC-SCNC: 13 MMOL/L
AST SERPL-CCNC: 25 U/L
BILIRUB SERPL-MCNC: 1 MG/DL
BUN SERPL-MCNC: 19 MG/DL
CALCIUM SERPL-MCNC: 10 MG/DL
CHLORIDE SERPL-SCNC: 107 MMOL/L
CO2 SERPL-SCNC: 28 MMOL/L
CREAT SERPL-MCNC: 1.2 MG/DL
EGFR: 62 ML/MIN/1.73M2
GLUCOSE SERPL-MCNC: 81 MG/DL
POTASSIUM SERPL-SCNC: 3.6 MMOL/L
PROT SERPL-MCNC: 7.3 G/DL
SODIUM SERPL-SCNC: 148 MMOL/L

## 2022-11-08 PROCEDURE — 36415 COLL VENOUS BLD VENIPUNCTURE: CPT

## 2022-11-08 PROCEDURE — 99214 OFFICE O/P EST MOD 30 MIN: CPT | Mod: 25

## 2022-11-08 PROCEDURE — 96372 THER/PROPH/DIAG INJ SC/IM: CPT

## 2022-11-08 RX ORDER — DENOSUMAB 60 MG/ML
120 INJECTION SUBCUTANEOUS ONCE
Refills: 0 | Status: COMPLETED | OUTPATIENT
Start: 2022-11-08 | End: 2022-11-08

## 2022-11-08 RX ADMIN — DENOSUMAB 120 MILLIGRAM(S): 60 INJECTION SUBCUTANEOUS at 14:00

## 2022-11-08 NOTE — REVIEW OF SYSTEMS
[Negative] : Allergic/Immunologic [Joint Pain] : joint pain [Fever] : no fever [Fatigue] : no fatigue [Recent Change In Weight] : ~T no recent weight change [Dysphagia] : no dysphagia [Loss of Hearing] : no loss of hearing [Nosebleeds] : no nosebleeds [Hoarseness] : no hoarseness [Odynophagia] : no odynophagia [Mucosal Pain] : no mucosal pain [Constipation] : no constipation [Incontinence] : no incontinence [Muscle Pain] : no muscle pain [Insomnia] : no insomnia [Anxiety] : no anxiety [Hot Flashes] : no hot flashes [FreeTextEntry9] : mild back pain

## 2022-11-08 NOTE — ASSESSMENT
[FreeTextEntry1] : Mr. Ferrer is a 77 year old male with history of BPH, HTN, and HLD who presents to clinic today for evaluation of newly diagnosed met MARK prostate cancer Grade Grp 4 ([place] score 4+4=8) with bone mets, PSA 58 9/28/21 referred by Dr. Dubois. s/p ThuLEP on 12/9/21, path confirmed foci of prostatic adenocarcinoma (Maira's pattern 3+4 total score 7, grade group 2 ) He started Zytiga/prednisone on 12/16/21 Recieved lupron on 12/29/21   Here for f/u after PSMA scan on 1/26/22 showing multiple avid osseous metastases. Started Xgeva (120mg Q 3 months) on 5/24/22. Today here for f/u. 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 Started zytiga/pred on 12/16/21. \par -- PSA pending today but as of last check 8/2022 was suppressed\par -- he is tolerating abiraterone/prednisone + ADT with mild side effects (fatigue, hot flashes). Continue this therapy.  indefinite treatment planned in the setting of metastatic disease, as long as disease is controlled and treatment is tolerable.\par --follows with urology for depot leuprolide.  next due 1/2023\par -- Will repeat CT CAP in Feb/2023. ordered\par \par # Bone metastases\par --receiving Xgeva every 3 months - last 8/16/22. Next shot will be on today 11/8/22.\par -- Advised to start Vitamin D supplementation. \par --vit d level pending\par -- follow up w/ dentist for routine care\par \par RTC in 3 months \par with CT c/a/p\par PSA, T, CBC, CMP\par and xgeva injection same day\par

## 2022-11-08 NOTE — HISTORY OF PRESENT ILLNESS
[de-identified] : Mr. Ferrer is a 77 year old male with history of BPH, HTN, HLD who presents to clinic today for evaluation of metastatic MARK prostate cancer Grade Grp 4 (Dem[place] score 4+4=8) with bone mets, PSA 58 9/28/21 referred by Dr. Dubois. s/p ThuLEP on 12/9/21, path confirmed foci of prostatic adenocarcinoma (Demotte's pattern 3+4 total score 7, grade group 2 ) He started Zytiga/prednisone on 12/16/21  PSMA scan on 1/26/22 showed multiple avid osseous metastases. Started Xgeva(120mg Q 3 months) on 5/24/22. Today here for f/u. \par \par Colonoscopy in 2013, reportedly normal\par FMH- Sister with breast cancer, Half brother with prostate cancer\par \par ONC Hx\par Mr. Ferrer was being evaluated for 20 lb weight loss over 1 year with the inability to gain weight. He underwent CT scans which showed lower pole LEFT kidney 1.8 x 1.6 x 2.0 cm slightly exophytic solid enhancing mass, multiple bilateral simple cysts. markedly enlarged prostate gland with distended bladder and bladder diverticuli. PSA was 20.66 on 7/6/21. He had prostate biopsy with Dr. Melendrez  in 2011 which was negative. He was referred to Dr. Dubois on 9/28/21 for evaluation of kidney mass and elevated PSA. He notices weak stream, occasional staining to intimate stream, urinary frequency, and nocturia x 2.  He repeated PSA on 9/28/21 which was 58.40. He had MRI performed on 10/18/21 which showed 5.9 x 6.1 x 7.6 cm; volume 142 mL prostate with PIRADS 5 lesion measuring 1.6 cm Left posterolateral midgland peripheral zone. 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 Bone scans on 11/8/21 showed several osseous metastases. Dr. Tejada performed a targeted prostate biopsy with the UroNav MRI fusion on 11/11/20, pathology confirmed Adenocarcinoma of the prostate, Prognostic Grade Group 4 ([place] score 4+4=8). He is scheduled for ThuLEP with Dr. Brito on  12/9/21. \par \par 10/18/21 MRI at [facility] \par -5.9 x 6.1 x 7.6 cm; volume 142 mL prostate with PIRADS 5 lesion measuring 1.6 cm Left posterolateral midgland peripheral zone. 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 \par \par 11/8/21 Bone scans  \par Findings: Focal radiotracer activity in the right clavicular head, probable right second rib, probable T6 vertebral body, and left sacrum, suspicious for osseous metastasis. Focal radiotracer activity in the L3 vertebral body, indeterminate. \par Symmetric bilateral renal radiotracer activity. No abnormal focal soft tissue activity. \par Distended urinary bladder with urinary radiotracer activity, obscuring pelvic bones, limiting evaluation. \par Impression: \par Several osseous metastases as described above. \par \par 11/11/21 \par Surgical Pathology Report  \par Final Diagnosis \par 1. Prostate, left anterior apex, biopsy \par -Benign prostate tissue. \par 2. Prostate, left anterior base, biopsy \par -Benign prostate tissue. \par 3. Prostate, right anterior apex, biopsy \par -Benign prostate tissue. \par 4. Prostate, right anterior base, biopsy \par -Benign prostate tissue. \par 5. Prostate, midline apex, biopsy \par -Benign prostate tissue. \par 6. Prostate, midline base, biopsy \par -Benign prostate tissue. \par 7. Prostate, left posterior apex, biopsy \par -Adenocarcinoma of the prostate, Prognostic Grade Group 4 (Maira score 4+4=8) involving 70% (7.5 mm in length) of 1 of 1 core(s). \par 8. Prostate, left posterior base, biopsy \par -Benign prostate tissue. \par 9. Prostate, right posterior apex, biopsy \par -Adenocarcinoma of the prostate, Prognostic Grade Group 1 (Maira score 3+3=6) involving less than 5% (less than 0.5 mm in length) of 1 of 1 core(s). \par 10. Prostate, right posterior base, biopsy \par -Benign prostate tissue. \par 11. Prostate, left lateral, biopsy \par -Benign prostate tissue. \par 12. Prostate, right lateral, biopsy \par -Benign prostate tissue. \par 13. Prostate, left mid PZPL, biopsy \par -Adenocarcinoma of the prostate, Prognostic Grade Group 4 (Maira score 4+4=8) involving 60% (5.5 mm in length) of 1 of 4 core(s). \par -Adenocarcinoma of the prostate, Prognostic Grade Group 1 (Maira score 3+3=6) involving 5% (0.5 mm in length) of 1 of 4 core(s). \par -A total of 2 of 4 cores involved by carcinoma. \par Note: Cribriform Maira pattern 4 is present. \par \par MLH1:   Intact nuclear expression\par MSH2:   Intact nuclear expression\par MSH6:   Intact nuclear expression\par PMS2:   Intact nuclear expression\par \par Interpretation:  No loss of nuclear expression of MMR proteins (MLH1, MSH2, MSH6, and PMS2).   These results show low probability of microsatellite\par instability-high (MSI-H). There is no evidence of DNA mismatch repair deficiency in the analyzed tissue, indicating there is a low probability for Barrera syndrome (a\par common cause hereditary non polyposis colorectal cancer or HNPCC).\par \par 12/9/21 s/p laser enucleation of the prostate with morcellator\par Surgical Pathology Report \par Final Diagnosis\par 1. Prostate chips:\par - Benign prostatic hyperplasia and foci of prostatic adenocarcinoma (Maira's pattern 3+4 total score 7) grade group 2 involving less than 5% of the submitted tissue\par - Demotte's pattern 4 is less than 5% of the tumor\par 2. Bladder stone:\par - Calculus, gross diagnosis.\par - Specimen submitted for chemical analysis and will be reported separately\par \par 12/15/21 CT ABDOMEN AND PELVIS IC & CT CHEST IC\par Diffusely thick-walled urinary bladder which could be due to cystitis or bladder outlet obstruction. Perivesical fat infiltration suggesting cystitis.\par Enlarged prostate. Large TURP defect. No retroperitoneal or pelvic adenopathy.\par Indeterminate 1.9 cm cortical lesion in the lower pole left kidney. Could represent complicated cyst or tumor. Findings should be correlated with targeted renal sonography and/or MR.\par No intrathoracic adenopathy.\par Bone lesions T7, T11, L3 and L5 vertebral bodies and right second rib consistent with metastases.\par Staging for prostatic carcinoma at present appears to be T2, N0, M1b.\par \par 1/7/22 FO NGS\par CDK12 K418fs*14 \par \par Microsatellite status - MS-Stable\par Tumor Mutational [place] - 3 Muts/Mb\par Genomic Findings\par For a complete list of the genes assayed, please refer to the Appendix.\par CDK12 K418fs*14\par FGFR3 amplification\par FGFR4 amplification - equivocal†\par MDM2 amplification\par MDM4 amplification - equivocal†\par PIK3CA amplification\par CIC loss\par IGF1R amplification\par IKBKE amplification - equivocal†\par IRS2 amplification - equivocal†\par MYCL1 amplification - equivocal†\par CNS4O4D amplification - equivocal†\par \par 1/26/22 PSMA\par Multiple DCFPyL-avid osseous metastases.\par \par 6/30/22 CT CAP\par 1.Since 12/15/2021, decreased prostate size.\par 2. No change in the extent of osseous metastatic disease, though several of the bone metastases have become more sclerotic, which could reflect posttreatment change.\par 3. No change in size of a 1.9 cm left renal mass which is again more dense than a simple cyst. It could represent a hemorrhagic cyst or solid mass. Either continued follow-up recommended or definitive characterization with dedicated imaging.\par  [de-identified] : Patient presents to clinic today for f/u. He is feeling well. Reports intermittent mild back pain. He denies any hematuria, abdominal pain, n/v/d/c, chest pain, sob, leg swelling or joint pain.  No dental concerns. BP elevated today. \par

## 2022-11-08 NOTE — END OF VISIT
[] : Fellow [FreeTextEntry3] : \par I saw and evaluated the pt with the hematology/oncology fellow, Dr Miranda.  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  Tolerating treatment.  also receiving xgeva for prevention of skeletal related events.  BP mildly elevated at this visit which pt attributes to stress;  faithful with antihypertensive therapy.  No other concerns.  plan of care as above.

## 2022-11-09 PROBLEM — E55.9 VITAMIN D INSUFFICIENCY: Status: ACTIVE | Noted: 2022-11-09

## 2022-11-09 LAB
25(OH)D3 SERPL-MCNC: 29.5 NG/ML
PSA FREE FLD-MCNC: NORMAL %
PSA FREE SERPL-MCNC: <0.01 NG/ML
PSA SERPL-MCNC: 0.03 NG/ML
TESTOST SERPL-MCNC: <2.5 NG/DL

## 2022-11-18 ENCOUNTER — APPOINTMENT (OUTPATIENT)
Dept: HEART AND VASCULAR | Facility: CLINIC | Age: 77
End: 2022-11-18

## 2022-11-18 VITALS
TEMPERATURE: 98 F | DIASTOLIC BLOOD PRESSURE: 90 MMHG | RESPIRATION RATE: 18 BRPM | BODY MASS INDEX: 26.52 KG/M2 | HEART RATE: 84 BPM | WEIGHT: 165 LBS | SYSTOLIC BLOOD PRESSURE: 154 MMHG | OXYGEN SATURATION: 100 % | HEIGHT: 66 IN

## 2022-11-18 PROCEDURE — 93000 ELECTROCARDIOGRAM COMPLETE: CPT

## 2022-11-18 PROCEDURE — 99214 OFFICE O/P EST MOD 30 MIN: CPT

## 2022-11-18 NOTE — DISCUSSION/SUMMARY
[FreeTextEntry1] : stable exam\par HTn- stable, elevated, discussed lifestyle modification, BP log\par Lipids stable on statin [EKG obtained to assist in diagnosis and management of assessed problem(s)] : EKG obtained to assist in diagnosis and management of assessed problem(s)

## 2022-11-18 NOTE — PHYSICAL EXAM
[Well Developed] : well developed [Well Nourished] : well nourished [No Acute Distress] : no acute distress [Normal Conjunctiva] : normal conjunctiva [No Carotid Bruit] : no carotid bruit [Normal Venous Pressure] : normal venous pressure [Normal S1, S2] : normal S1, S2 [No Murmur] : no murmur [No Rub] : no rub [No Gallop] : no gallop [Clear Lung Fields] : clear lung fields [Good Air Entry] : good air entry [No Respiratory Distress] : no respiratory distress  [Soft] : abdomen soft [Non Tender] : non-tender [No Masses/organomegaly] : no masses/organomegaly [Normal Bowel Sounds] : normal bowel sounds [Normal Gait] : normal gait [No Edema] : no edema [No Cyanosis] : no cyanosis [No Clubbing] : no clubbing [No Rash] : no rash [Moves all extremities] : moves all extremities [No Focal Deficits] : no focal deficits [Normal Speech] : normal speech [Alert and Oriented] : alert and oriented [Normal memory] : normal memory

## 2023-01-04 ENCOUNTER — APPOINTMENT (OUTPATIENT)
Dept: UROLOGY | Facility: CLINIC | Age: 78
End: 2023-01-04
Payer: MEDICARE

## 2023-01-04 VITALS
TEMPERATURE: 97.3 F | DIASTOLIC BLOOD PRESSURE: 90 MMHG | WEIGHT: 165 LBS | HEART RATE: 92 BPM | HEIGHT: 66 IN | SYSTOLIC BLOOD PRESSURE: 133 MMHG | OXYGEN SATURATION: 100 % | BODY MASS INDEX: 26.52 KG/M2

## 2023-01-04 LAB
BILIRUB UR QL STRIP: NORMAL
CLARITY UR: CLEAR
COLLECTION METHOD: NORMAL
GLUCOSE UR-MCNC: NORMAL
HCG UR QL: 0.2 EU/DL
HGB UR QL STRIP.AUTO: NORMAL
KETONES UR-MCNC: NORMAL
LEUKOCYTE ESTERASE UR QL STRIP: NORMAL
NITRITE UR QL STRIP: NORMAL
PH UR STRIP: 5
PROT UR STRIP-MCNC: NORMAL
SP GR UR STRIP: 1

## 2023-01-04 PROCEDURE — 96402 CHEMO HORMON ANTINEOPL SQ/IM: CPT

## 2023-01-04 PROCEDURE — 99214 OFFICE O/P EST MOD 30 MIN: CPT | Mod: 25

## 2023-01-04 PROCEDURE — 51798 US URINE CAPACITY MEASURE: CPT

## 2023-01-04 RX ORDER — LEUPROLIDE ACETATE 22.5 MG
22.5 KIT INTRAMUSCULAR
Qty: 1 | Refills: 0 | Status: COMPLETED | OUTPATIENT
Start: 2023-01-04

## 2023-01-04 RX ADMIN — LEUPROLIDE ACETATE 1 MG: KIT at 00:00

## 2023-01-04 NOTE — HISTORY OF PRESENT ILLNESS
[FreeTextEntry1] : 11/23/21: 76 year old man with BPH, history of urinary retention and newly diagnosed metastatic prostate cancer.\par \par Regarding prostate cancer, he had MRI at [facility] on 10/18/2021. 5.9 x 6.1 x 7.6 cm; volume 142 mL prostate with PIRADS 5 lesion measuring 1.6 cm Left posterolateral midgland peripheral zone. 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 The standard biopsy detected GG4 cancer which did overlap with the targeted biopsy. 2/12 cores. location(s). LPA GG4, RPA GG1\par \par \par Bone scan positive at multiple sites. He is scheduled to see Dr. Reed to discuss treatment for metastatic prostate cancer. He has not started ADT yet.\par \par He has long history of LUTS as well. He was taking saw palmetto for a long time. His symptoms are primarily weak stream, straining, urinary frequency, nocturia and need to defecate when urinating. He has elevated PVRs, up to 600 cc in the office. He went into urinary retention after prostate biopsy, but is now voiding. He started flomax and finasteride recently and has noticed improved urinary stream with those medications. No fevers, chills, dysuria, hematuria.\par \par 12/9/21: ThuLEP, procedure uncomplicated. Passed void trial POD 1.\par \par pathology: 91 grams, [place] 3+4 prostate cancer involving less than 5% of the tissue\par \par 12/29/21: Doing well. Voiding with strong stream, complete emptying, decreased frequency, urgency. No leakage. No hematuria. Started abiraterone/prednisone with Dr. Reed.\par \par IPSS 4, QOL 1, PVR 32\par \par 4/6/22: Doing well. Strong stream, complete emptying. No hematuria. Not wearing any pads. No stress incontinence. He does have occasional urge with small amount of leakage if he tries to hold for too long. This is infrequent. No hot flashes or bone pain. Remains on abiraterone/prednisone. Received lupron injection today.\par \par IPSS 7, QOL 2, PVR 12\par \par Uroflow: Voided volume 95.1, Qmax 24.5\par \par 7/6/22: Doing well. Symptoms unchanged from prior. Remains on lupron, zytiga and xgeva. CT A/P with no change in extent of osseous metastases, though lesions have become more sclerotic. There is a stable 1.9 cm left renal mass, which either represents a hemorrhagic cyst or solid mass.\par \par \par 10/5/22: Doing well. Urinary symptoms stable. Remains on lupron, zytiga and xgeva. No new complaints. Voiding with strong stream, complete emptying, no leakage.\par \par IPSS 5, QOL 2, PVR 24 cc\par \par 1/4/23: Doing well. Stable urinary symptoms. No leakage. \par \par IPSS: 3\par QOL; 2\par PVR: 17 cc\par \par PSA 7/6/21: 20.66 ng/ml; 9/28/21--58.4; 12/8/21--220; 12/22/21--78.3; 1/12/22--8.95; 2/9/22--0.67; 3/23/22--0.23; 5/25/22--0.09; 7/6/22--0.06; 11/8/22--0.03\par \par \par \par \par

## 2023-01-04 NOTE — ASSESSMENT
[FreeTextEntry1] : 76 year old man with metastatic prostate cancer to bone with multiple sites, 140 cc prostate with chronic LUTS and recent urinary retention s/p ThuLEP 12/9/21. Passed void trial POD 1. Pathology with 91 grams of prostate tissue with [place] 3+4 prostate cancer in less than 5% of tissue. He is on abiraterone/prednisone and lupron every 3 months. He received lupron injection today. He is taking calcium/Vit D supplement. Doing well with strong stream, complete emptying, no stress leakage and no hematuria. CT A/P 6/30 with stable osseous lesions, possibly sclerotic from treatment. Stable 1.9 cm left renal mass, hemorrhagic cyst vs solid mass. Surveillance is reasonable.\par  - F/U in 3 months for lupron injection\par  - F/U with Oncology for continued management of metastatic prostate cancer.

## 2023-01-04 NOTE — PHYSICAL EXAM
[General Appearance - Well Developed] : well developed [General Appearance - Well Nourished] : well nourished [Normal Appearance] : normal appearance [Well Groomed] : well groomed [General Appearance - In No Acute Distress] : no acute distress [Oriented To Time, Place, And Person] : oriented to person, place, and time [Affect] : the affect was normal [Mood] : the mood was normal [Not Anxious] : not anxious [] : no respiratory distress [Respiration, Rhythm And Depth] : normal respiratory rhythm and effort [Exaggerated Use Of Accessory Muscles For Inspiration] : no accessory muscle use [Normal Station and Gait] : the gait and station were normal for the patient's age

## 2023-02-01 ENCOUNTER — OUTPATIENT (OUTPATIENT)
Dept: OUTPATIENT SERVICES | Facility: HOSPITAL | Age: 78
LOS: 1 days | End: 2023-02-01
Payer: MEDICARE

## 2023-02-01 ENCOUNTER — RESULT REVIEW (OUTPATIENT)
Age: 78
End: 2023-02-01

## 2023-02-01 ENCOUNTER — APPOINTMENT (OUTPATIENT)
Dept: CT IMAGING | Facility: HOSPITAL | Age: 78
End: 2023-02-01
Payer: MEDICARE

## 2023-02-01 LAB — POCT ISTAT CREATININE: 1 MG/DL — SIGNIFICANT CHANGE UP (ref 0.5–1.3)

## 2023-02-01 PROCEDURE — 74177 CT ABD & PELVIS W/CONTRAST: CPT

## 2023-02-01 PROCEDURE — 74177 CT ABD & PELVIS W/CONTRAST: CPT | Mod: 26,MH

## 2023-02-01 PROCEDURE — 71260 CT THORAX DX C+: CPT | Mod: 26,MH

## 2023-02-01 PROCEDURE — 71260 CT THORAX DX C+: CPT

## 2023-02-01 PROCEDURE — 82565 ASSAY OF CREATININE: CPT

## 2023-02-06 RX ORDER — DENOSUMAB 60 MG/ML
120 INJECTION SUBCUTANEOUS ONCE
Refills: 0 | Status: COMPLETED | OUTPATIENT
Start: 2023-10-25 | End: 2023-10-25

## 2023-02-06 RX ORDER — DENOSUMAB 60 MG/ML
120 INJECTION SUBCUTANEOUS ONCE
Refills: 0 | Status: COMPLETED | OUTPATIENT
Start: 2023-05-02 | End: 2023-05-02

## 2023-02-07 ENCOUNTER — APPOINTMENT (OUTPATIENT)
Dept: HEMATOLOGY ONCOLOGY | Facility: CLINIC | Age: 78
End: 2023-02-07
Payer: MEDICARE

## 2023-02-07 ENCOUNTER — OUTPATIENT (OUTPATIENT)
Dept: OUTPATIENT SERVICES | Facility: HOSPITAL | Age: 78
LOS: 1 days | End: 2023-02-07
Payer: MEDICARE

## 2023-02-07 ENCOUNTER — LABORATORY RESULT (OUTPATIENT)
Age: 78
End: 2023-02-07

## 2023-02-07 ENCOUNTER — APPOINTMENT (OUTPATIENT)
Dept: INFUSION THERAPY | Facility: CLINIC | Age: 78
End: 2023-02-07

## 2023-02-07 VITALS
BODY MASS INDEX: 27.32 KG/M2 | SYSTOLIC BLOOD PRESSURE: 136 MMHG | DIASTOLIC BLOOD PRESSURE: 87 MMHG | OXYGEN SATURATION: 96 % | TEMPERATURE: 97.4 F | RESPIRATION RATE: 18 BRPM | WEIGHT: 170 LBS | HEIGHT: 66 IN | HEART RATE: 109 BPM

## 2023-02-07 VITALS
HEIGHT: 66 IN | SYSTOLIC BLOOD PRESSURE: 136 MMHG | RESPIRATION RATE: 20 BRPM | DIASTOLIC BLOOD PRESSURE: 87 MMHG | WEIGHT: 170.42 LBS | HEART RATE: 109 BPM | TEMPERATURE: 97 F | OXYGEN SATURATION: 96 %

## 2023-02-07 DIAGNOSIS — C61 MALIGNANT NEOPLASM OF PROSTATE: ICD-10-CM

## 2023-02-07 LAB
ALBUMIN SERPL ELPH-MCNC: 3.4 G/DL
ALP BLD-CCNC: 46 U/L
ALT SERPL-CCNC: 26 U/L
ANION GAP SERPL CALC-SCNC: 13 MMOL/L
AST SERPL-CCNC: 25 U/L
BILIRUB SERPL-MCNC: 1 MG/DL
BUN SERPL-MCNC: 17 MG/DL
CALCIUM SERPL-MCNC: 9.7 MG/DL
CHLORIDE SERPL-SCNC: 103 MMOL/L
CO2 SERPL-SCNC: 26 MMOL/L
CREAT SERPL-MCNC: 1.2 MG/DL
EGFR: 62 ML/MIN/1.73M2
GLUCOSE SERPL-MCNC: 138 MG/DL
POTASSIUM SERPL-SCNC: 4.1 MMOL/L
PROT SERPL-MCNC: 7.3 G/DL
SODIUM SERPL-SCNC: 142 MMOL/L

## 2023-02-07 PROCEDURE — 36415 COLL VENOUS BLD VENIPUNCTURE: CPT

## 2023-02-07 PROCEDURE — 99214 OFFICE O/P EST MOD 30 MIN: CPT | Mod: 25

## 2023-02-07 PROCEDURE — 96372 THER/PROPH/DIAG INJ SC/IM: CPT

## 2023-02-07 RX ORDER — DENOSUMAB 60 MG/ML
120 INJECTION SUBCUTANEOUS ONCE
Refills: 0 | Status: COMPLETED | OUTPATIENT
Start: 2023-02-07 | End: 2023-02-07

## 2023-02-07 RX ORDER — PREDNISONE 5 MG/1
5 TABLET ORAL
Qty: 60 | Refills: 6 | Status: DISCONTINUED | COMMUNITY
Start: 2021-12-08 | End: 2023-02-07

## 2023-02-07 RX ORDER — ABIRATERONE ACETATE 500 MG/1
500 TABLET, FILM COATED ORAL DAILY
Qty: 60 | Refills: 6 | Status: DISCONTINUED | COMMUNITY
Start: 2021-12-08 | End: 2023-02-07

## 2023-02-07 RX ADMIN — DENOSUMAB 120 MILLIGRAM(S): 60 INJECTION SUBCUTANEOUS at 13:11

## 2023-02-07 NOTE — ASSESSMENT
[FreeTextEntry1] : Mr. 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 Dubois. s/p ThuLEP on 12/9/21, path confirmed foci of prostatic adenocarcinoma (Maira's pattern 3+4 total score 7, grade group 2 ) He started Zytiga/prednisone on 12/16/21 Recieved lupron on 12/29/21   Here for f/u after PSMA scan on 1/26/22 showing multiple avid osseous metastases. Started Xgeva (120mg Q 3 months) on 5/24/22. Today here for f/u with Xgeva. 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 Started zytiga/pred on 12/16/21. \par -- PSA trending down, 0.03 on 11/8/22. pending today\par -- he is tolerating abiraterone/prednisone + ADT with mild side effects (fatigue, hot flashes). Continue this therapy.  indefinite treatment planned in the setting of metastatic disease, as long as disease is controlled and treatment is tolerable.\par --swiching to [facility] due to drug cost.  can take prednisone 5 mg po daily instead of BID.\par --follows with urology for depot leuprolide.  next due 4/5/23\par --Repeated CT CAP on 2/1/23 with stable disease \par \par # Bone metastases\par --receiving Xgeva every 3 months - last 11/8/22. labs reviewed and will give shot today.  \par -- Advised to start Vitamin D supplementation. \par --vit D 29.5 on 11/8/22, did not start taking vit D 69447. Advised him to stop daily vitamin and start vit D 15625 once weekly for 2 months. \par --follow up w/ dentist for routine care\par \par RTC  with Xgeva on 5/2/23\par labs- PSA, T, CBC, CMP\par \par

## 2023-02-07 NOTE — REVIEW OF SYSTEMS
[Negative] : Allergic/Immunologic [Fatigue] : fatigue [Recent Change In Weight] : ~T recent weight change [Joint Pain] : joint pain [Hot Flashes] : hot flashes [Fever] : no fever [Chills] : no chills [Night Sweats] : no night sweats [Dysphagia] : no dysphagia [Loss of Hearing] : no loss of hearing [Nosebleeds] : no nosebleeds [Hoarseness] : no hoarseness [Odynophagia] : no odynophagia [Mucosal Pain] : no mucosal pain [Constipation] : no constipation [Incontinence] : no incontinence [Joint Stiffness] : no joint stiffness [Muscle Pain] : no muscle pain [Insomnia] : no insomnia [Anxiety] : no anxiety [Proptosis] : no proptosis [Muscle Weakness] : no muscle weakness [Deepening Of The Voice] : no deepening of the voice [FreeTextEntry2] : weight gain

## 2023-02-07 NOTE — HISTORY OF PRESENT ILLNESS
[de-identified] : Mr. 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 Dubois. s/p ThuLEP on 12/9/21, path confirmed foci of prostatic adenocarcinoma (Maira's pattern 3+4 total score 7, grade group 2 ) He started Zytiga/prednisone on 12/16/21  PSMA scan on 1/26/22 showed multiple avid osseous metastases. Started Xgeva(120mg Q 3 months) on 5/24/22. Today here for f/u and xgeva.\par \par Colonoscopy in 2013, reportedly normal\par FMH- Sister with breast cancer, Half brother with prostate cancer\par \par ONC Hx\par Mr. Ferrer was being evaluated for 20 lb weight loss over 1 year with the inability to gain weight. He underwent CT scans which showed lower pole LEFT kidney 1.8 x 1.6 x 2.0 cm slightly exophytic solid enhancing mass, multiple bilateral simple cysts. markedly enlarged prostate gland with distended bladder and bladder diverticuli. PSA was 20.66 on 7/6/21. He had prostate biopsy with Dr. Melendrez  in 2011 which was negative. He was referred to Dr. Dubois on 9/28/21 for evaluation of kidney mass and elevated PSA. He notices weak stream, occasional staining to intimate stream, urinary frequency, and nocturia x 2.  He repeated PSA on 9/28/21 which was 58.40. He had MRI performed on 10/18/21 which showed 5.9 x 6.1 x 7.6 cm; volume 142 mL prostate with PIRADS 5 lesion measuring 1.6 cm Left posterolateral midgland peripheral zone. 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 Bone scans on 11/8/21 showed several osseous metastases. Dr. Tejada performed a targeted prostate biopsy with the UroNav MRI fusion on 11/11/20, pathology confirmed Adenocarcinoma of the prostate, Prognostic Grade Group 4 (Al[place] score 4+4=8). He is scheduled for ThuLEP with Dr. Brito on  12/9/21. \par \par 10/18/21 MRI at [place] \par -5.9 x 6.1 x 7.6 cm; volume 142 mL prostate with PIRADS 5 lesion measuring 1.6 cm Left posterolateral midgland peripheral zone. 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 \par \par 11/8/21 Bone scans  \par Findings: Focal radiotracer activity in the right clavicular head, probable right second rib, probable T6 vertebral body, and left sacrum, suspicious for osseous metastasis. Focal radiotracer activity in the L3 vertebral body, indeterminate. \par Symmetric bilateral renal radiotracer activity. No abnormal focal soft tissue activity. \par Distended urinary bladder with urinary radiotracer activity, obscuring pelvic bones, limiting evaluation. \par Impression: \par Several osseous metastases as described above. \par \par 11/11/21 \par Surgical Pathology Report  \par Final Diagnosis \par 1. Prostate, left anterior apex, biopsy \par -Benign prostate tissue. \par 2. Prostate, left anterior base, biopsy \par -Benign prostate tissue. \par 3. Prostate, right anterior apex, biopsy \par -Benign prostate tissue. \par 4. Prostate, right anterior base, biopsy \par -Benign prostate tissue. \par 5. Prostate, midline apex, biopsy \par -Benign prostate tissue. \par 6. Prostate, midline base, biopsy \par -Benign prostate tissue. \par 7. Prostate, left posterior apex, biopsy \par -Adenocarcinoma of the prostate, Prognostic Grade Group 4 (Alb[place] score 4+4=8) involving 70% (7.5 mm in length) of 1 of 1 core(s). \par 8. Prostate, left posterior base, biopsy \par -Benign prostate tissue. \par 9. Prostate, right posterior apex, biopsy \par -Adenocarcinoma of the prostate, Prognostic Grade Group 1 (Albany score 3+3=6) involving less than 5% (less than 0.5 mm in length) of 1 of 1 core(s). \par 10. Prostate, right posterior base, biopsy \par -Benign prostate tissue. \par 11. Prostate, left lateral, biopsy \par -Benign prostate tissue. \par 12. Prostate, right lateral, biopsy \par -Benign prostate tissue. \par 13. Prostate, left mid PZPL, biopsy \par -Adenocarcinoma of the prostate, Prognostic Grade Group 4 (Maira score 4+4=8) involving 60% (5.5 mm in length) of 1 of 4 core(s). \par -Adenocarcinoma of the prostate, Prognostic Grade Group 1 ([place] score 3+3=6) involving 5% (0.5 mm in length) of 1 of 4 core(s). \par -A total of 2 of 4 cores involved by carcinoma. \par Note: Cribriform Maira pattern 4 is present. \par \par MLH1:   Intact nuclear expression\par MSH2:   Intact nuclear expression\par MSH6:   Intact nuclear expression\par PMS2:   Intact nuclear expression\par \par Interpretation:  No loss of nuclear expression of MMR proteins (MLH1, MSH2, MSH6, and PMS2).   These results show low probability of microsatellite\par instability-high (MSI-H). There is no evidence of DNA mismatch repair deficiency in the analyzed tissue, indicating there is a low probability for Barrera syndrome (a\par common cause hereditary non polyposis colorectal cancer or HNPCC).\par \par 12/9/21 s/p laser enucleation of the prostate with morcellator\par Surgical Pathology Report \par Final Diagnosis\par 1. Prostate chips:\par - Benign prostatic hyperplasia and foci of prostatic adenocarcinoma (Albany's pattern 3+4 total score 7) grade group 2 involving less than 5% of the submitted tissue\par - Maira's pattern 4 is less than 5% of the tumor\par 2. Bladder stone:\par - Calculus, gross diagnosis.\par - Specimen submitted for chemical analysis and will be reported separately\par \par 12/15/21 CT ABDOMEN AND PELVIS IC & CT CHEST IC\par Diffusely thick-walled urinary bladder which could be due to cystitis or bladder outlet obstruction. Perivesical fat infiltration suggesting cystitis.\par Enlarged prostate. Large TURP defect. No retroperitoneal or pelvic adenopathy.\par Indeterminate 1.9 cm cortical lesion in the lower pole left kidney. Could represent complicated cyst or tumor. Findings should be correlated with targeted renal sonography and/or MR.\par No intrathoracic adenopathy.\par Bone lesions T7, T11, L3 and L5 vertebral bodies and right second rib consistent with metastases.\par Staging for prostatic carcinoma at present appears to be T2, N0, M1b.\par \par 1/7/22 FO NGS\par CDK12 K418fs*14 \par \par Microsatellite status - MS-Stable\par Tumor Mutational [place] - 3 Muts/Mb\par Genomic Findings\par For a complete list of the genes assayed, please refer to the Appendix.\par CDK12 K418fs*14\par FGFR3 amplification\par FGFR4 amplification - equivocal†\par MDM2 amplification\par MDM4 amplification - equivocal†\par PIK3CA amplification\par CIC loss\par IGF1R amplification\par IKBKE amplification - equivocal†\par IRS2 amplification - equivocal†\par MYCL1 amplification - equivocal†\par [id number] amplification - equivocal†\par \par germline genetic testing negative\par \par 1/26/22 PSMA\par Multiple DCFPyL-avid osseous metastases.\par \par 6/30/22 CT CAP\par 1.Since 12/15/2021, decreased prostate size.\par 2. No change in the extent of osseous metastatic disease, though several of the bone metastases have become more sclerotic, which could reflect posttreatment change.\par 3. No change in size of a 1.9 cm left renal mass which is again more dense than a simple cyst. It could represent a hemorrhagic cyst or solid mass. Either continued follow-up recommended or definitive characterization with dedicated imaging.\par \par 2/1/23 CT CAP\par 1. Since June 13, 2022, unchanged osseous metastases.\par 2. No new suspicious finding. [de-identified] : Patient presents to clinic today for f/u. He gained a few pounds since last visit and states his appetite is good. He denies any new problems including hematuria, abdominal pain, n/v/d/c, chest pain, sob, leg swelling or joint pain.  No dental concerns. He feels great.  \par

## 2023-02-08 ENCOUNTER — NON-APPOINTMENT (OUTPATIENT)
Age: 78
End: 2023-02-08

## 2023-02-08 LAB
PSA SERPL-MCNC: 0.02 NG/ML
TESTOST SERPL-MCNC: <2.5 NG/DL

## 2023-03-15 ENCOUNTER — APPOINTMENT (OUTPATIENT)
Dept: HEART AND VASCULAR | Facility: CLINIC | Age: 78
End: 2023-03-15
Payer: MEDICARE

## 2023-03-15 ENCOUNTER — NON-APPOINTMENT (OUTPATIENT)
Age: 78
End: 2023-03-15

## 2023-03-15 VITALS
DIASTOLIC BLOOD PRESSURE: 90 MMHG | OXYGEN SATURATION: 97 % | WEIGHT: 161 LBS | HEIGHT: 66 IN | TEMPERATURE: 98.9 F | BODY MASS INDEX: 25.88 KG/M2 | SYSTOLIC BLOOD PRESSURE: 140 MMHG | HEART RATE: 97 BPM

## 2023-03-15 VITALS — SYSTOLIC BLOOD PRESSURE: 138 MMHG | DIASTOLIC BLOOD PRESSURE: 80 MMHG

## 2023-03-15 DIAGNOSIS — R01.1 CARDIAC MURMUR, UNSPECIFIED: ICD-10-CM

## 2023-03-15 PROCEDURE — 93306 TTE W/DOPPLER COMPLETE: CPT

## 2023-03-15 PROCEDURE — 93000 ELECTROCARDIOGRAM COMPLETE: CPT

## 2023-03-15 PROCEDURE — 99214 OFFICE O/P EST MOD 30 MIN: CPT

## 2023-03-15 NOTE — PHYSICAL EXAM
[Well Developed] : well developed [Well Nourished] : well nourished [No Acute Distress] : no acute distress [Normal Conjunctiva] : normal conjunctiva [Normal Venous Pressure] : normal venous pressure [No Carotid Bruit] : no carotid bruit [Normal S1, S2] : normal S1, S2 [No Rub] : no rub [No Gallop] : no gallop [Murmur] : murmur [Clear Lung Fields] : clear lung fields [Good Air Entry] : good air entry [No Respiratory Distress] : no respiratory distress  [Soft] : abdomen soft [Non Tender] : non-tender [No Masses/organomegaly] : no masses/organomegaly [Normal Bowel Sounds] : normal bowel sounds [Normal Gait] : normal gait [No Edema] : no edema [No Cyanosis] : no cyanosis [No Clubbing] : no clubbing [No Rash] : no rash [Moves all extremities] : moves all extremities [No Focal Deficits] : no focal deficits [Normal Speech] : normal speech [Alert and Oriented] : alert and oriented [Normal memory] : normal memory

## 2023-03-15 NOTE — DISCUSSION/SUMMARY
[FreeTextEntry1] : stable exam\par echo results discussed/ reassurance\par HTN stable on meds\par Lipids stable [EKG obtained to assist in diagnosis and management of assessed problem(s)] : EKG obtained to assist in diagnosis and management of assessed problem(s)

## 2023-04-05 ENCOUNTER — APPOINTMENT (OUTPATIENT)
Dept: UROLOGY | Facility: CLINIC | Age: 78
End: 2023-04-05
Payer: MEDICARE

## 2023-04-05 VITALS
OXYGEN SATURATION: 97 % | BODY MASS INDEX: 27 KG/M2 | HEIGHT: 66 IN | WEIGHT: 168 LBS | HEART RATE: 85 BPM | DIASTOLIC BLOOD PRESSURE: 87 MMHG | SYSTOLIC BLOOD PRESSURE: 147 MMHG | TEMPERATURE: 98.3 F

## 2023-04-05 DIAGNOSIS — R33.9 RETENTION OF URINE, UNSPECIFIED: ICD-10-CM

## 2023-04-05 DIAGNOSIS — N28.89 OTHER SPECIFIED DISORDERS OF KIDNEY AND URETER: ICD-10-CM

## 2023-04-05 PROCEDURE — 96402 CHEMO HORMON ANTINEOPL SQ/IM: CPT

## 2023-04-05 PROCEDURE — 99214 OFFICE O/P EST MOD 30 MIN: CPT | Mod: 25

## 2023-04-05 RX ORDER — LEUPROLIDE ACETATE 22.5 MG
22.5 KIT INTRAMUSCULAR
Qty: 1 | Refills: 0 | Status: COMPLETED | OUTPATIENT
Start: 2023-04-05

## 2023-04-05 RX ADMIN — LEUPROLIDE ACETATE 1 MG: KIT at 00:00

## 2023-04-05 NOTE — HISTORY OF PRESENT ILLNESS
[FreeTextEntry1] : 11/23/21: 76 year old man with BPH, history of urinary retention and newly diagnosed metastatic prostate cancer.\par \par Regarding prostate cancer, he had MRI at [place] on 10/18/2021. 5.9 x 6.1 x 7.6 cm; volume 142 mL prostate with PIRADS 5 lesion measuring 1.6 cm Left posterolateral midgland peripheral zone. 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 The standard biopsy detected GG4 cancer which did overlap with the targeted biopsy. 2/12 cores. location(s). LPA GG4, RPA GG1\par \par \par Bone scan positive at multiple sites. He is scheduled to see Dr. Reed to discuss treatment for metastatic prostate cancer. He has not started ADT yet.\par \par He has long history of LUTS as well. He was taking saw palmetto for a long time. His symptoms are primarily weak stream, straining, urinary frequency, nocturia and need to defecate when urinating. He has elevated PVRs, up to 600 cc in the office. He went into urinary retention after prostate biopsy, but is now voiding. He started flomax and finasteride recently and has noticed improved urinary stream with those medications. No fevers, chills, dysuria, hematuria.\par \par 12/9/21: ThuLEP, procedure uncomplicated. Passed void trial POD 1.\par \par pathology: 91 grams, Bru[place] 3+4 prostate cancer involving less than 5% of the tissue\par \par 12/29/21: Doing well. Voiding with strong stream, complete emptying, decreased frequency, urgency. No leakage. No hematuria. Started abiraterone/prednisone with Dr. Reed.\par \par IPSS 4, QOL 1, PVR 32\par \par 4/6/22: Doing well. Strong stream, complete emptying. No hematuria. Not wearing any pads. No stress incontinence. He does have occasional urge with small amount of leakage if he tries to hold for too long. This is infrequent. No hot flashes or bone pain. Remains on abiraterone/prednisone. Received lupron injection today.\par \par IPSS 7, QOL 2, PVR 12\par \par Uroflow: Voided volume 95.1, Qmax 24.5\par \par 7/6/22: Doing well. Symptoms unchanged from prior. Remains on lupron, zytiga and xgeva. CT A/P with no change in extent of osseous metastases, though lesions have become more sclerotic. There is a stable 1.9 cm left renal mass, which either represents a hemorrhagic cyst or solid mass.\par \par \par 10/5/22: Doing well. Urinary symptoms stable. Remains on lupron, zytiga and xgeva. No new complaints. Voiding with strong stream, complete emptying, no leakage.\par \par IPSS 5, QOL 2, PVR 24 cc\par \par 1/4/23: Doing well. Stable urinary symptoms. No leakage. \par \par IPSS: 3\par QOL; 2\par PVR: 17 cc\par \par 4/5/23: Doing well. Less nocturia since stopping drinking tea at night. No leakage. CT 2/2023 with no new metastases, left renal mass is 2 cm and stable in appearance.\par \par IPSS 2, QOL 2\par \par PSA 7/6/21: 20.66 ng/ml; 9/28/21--58.4; 12/8/21--220; 12/22/21--78.3; 1/12/22--8.95; 2/9/22--0.67; 3/23/22--0.23; 5/25/22--0.09; 7/6/22--0.06; 11/8/22--0.03; 2/8/23--0.02\par \par \par \par \par \par

## 2023-04-05 NOTE — ASSESSMENT
[FreeTextEntry1] : 76 year old man with metastatic prostate cancer to bone with multiple sites, 140 cc prostate with chronic LUTS and recent urinary retention s/p ThuLEP 12/9/21. Passed void trial POD 1. Pathology with 91 grams of prostate tissue with [place] 3+4 prostate cancer in less than 5% of tissue. He is on abiraterone/prednisone and lupron every 3 months. He received lupron injection today. He is taking calcium/Vit D supplement. Doing well with strong stream, complete emptying, no stress leakage and no hematuria. CT A/P 6/30 with stable osseous lesions, possibly sclerotic from treatment. Stable 1.9 cm left renal mass, hemorrhagic cyst vs solid mass. CT 2/2023 with no new metastases and stable 2 cm renal mass. Surveillance is reasonable. \par  - F/U in 3 months for lupron injection\par  - F/U with Oncology for continued management of metastatic prostate cancer. \par

## 2023-04-06 LAB
BILIRUB UR QL STRIP: NORMAL
CLARITY UR: CLEAR
COLLECTION METHOD: NORMAL
GLUCOSE UR-MCNC: NORMAL
HCG UR QL: 0.2 EU/DL
HGB UR QL STRIP.AUTO: NORMAL
KETONES UR-MCNC: NORMAL
LEUKOCYTE ESTERASE UR QL STRIP: NORMAL
NITRITE UR QL STRIP: NORMAL
PH UR STRIP: 5
PROT UR STRIP-MCNC: NORMAL
SP GR UR STRIP: 1.02

## 2023-04-10 ENCOUNTER — RX RENEWAL (OUTPATIENT)
Age: 78
End: 2023-04-10

## 2023-04-11 ENCOUNTER — APPOINTMENT (OUTPATIENT)
Dept: HEMATOLOGY ONCOLOGY | Facility: CLINIC | Age: 78
End: 2023-04-11
Payer: MEDICARE

## 2023-04-11 VITALS
OXYGEN SATURATION: 97 % | HEART RATE: 82 BPM | BODY MASS INDEX: 27 KG/M2 | TEMPERATURE: 98.6 F | HEIGHT: 66 IN | DIASTOLIC BLOOD PRESSURE: 82 MMHG | WEIGHT: 168 LBS | SYSTOLIC BLOOD PRESSURE: 161 MMHG

## 2023-04-11 PROCEDURE — 99213 OFFICE O/P EST LOW 20 MIN: CPT

## 2023-04-11 NOTE — REVIEW OF SYSTEMS
Patient up to bathroom with assist x 2. Voided at this time. Patient transferred to mother/baby room 363 per wheelchair in stable condition with baby and personal belongings. Accompanied by significant other and staff.   Report given to [facility] [Fatigue] : fatigue [Joint Pain] : joint pain [Hot Flashes] : hot flashes [Negative] : Allergic/Immunologic [Fever] : no fever [Chills] : no chills [Night Sweats] : no night sweats [Recent Change In Weight] : ~T no recent weight change [Dysphagia] : no dysphagia [Loss of Hearing] : no loss of hearing [Nosebleeds] : no nosebleeds [Hoarseness] : no hoarseness [Odynophagia] : no odynophagia [Mucosal Pain] : no mucosal pain [Constipation] : no constipation [Incontinence] : no incontinence [Joint Stiffness] : no joint stiffness [Muscle Pain] : no muscle pain [Insomnia] : no insomnia [Anxiety] : no anxiety [Proptosis] : no proptosis [Muscle Weakness] : no muscle weakness [Deepening Of The Voice] : no deepening of the voice

## 2023-04-11 NOTE — HISTORY OF PRESENT ILLNESS
[de-identified] : Mr. 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 Dubois. s/p ThuLEP on 12/9/21, path confirmed foci of prostatic adenocarcinoma (Maira's pattern 3+4 total score 7, grade group 2 ) He started Zytiga/prednisone on 12/16/21  PSMA scan on 1/26/22 showed multiple avid osseous metastases. Started Xgeva(120mg Q 3 months) on 5/24/22. Today here for f/u. \par \par Colonoscopy in 2013, reportedly normal\par FMH- Sister with breast cancer, Half brother with prostate cancer\par \par ONC Hx\par Mr. Ferrer was being evaluated for 20 lb weight loss over 1 year with the inability to gain weight. He underwent CT scans which showed lower pole LEFT kidney 1.8 x 1.6 x 2.0 cm slightly exophytic solid enhancing mass, multiple bilateral simple cysts. markedly enlarged prostate gland with distended bladder and bladder diverticuli. PSA was 20.66 on 7/6/21. He had prostate biopsy with Dr. Melendrez  in 2011 which was negative. He was referred to Dr. Dubois on 9/28/21 for evaluation of kidney mass and elevated PSA. He notices weak stream, occasional staining to intimate stream, urinary frequency, and nocturia x 2.  He repeated PSA on 9/28/21 which was 58.40. He had MRI performed on 10/18/21 which showed 5.9 x 6.1 x 7.6 cm; volume 142 mL prostate with PIRADS 5 lesion measuring 1.6 cm Left posterolateral midgland peripheral zone. 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 Bone scans on 11/8/21 showed several osseous metastases. Dr. Tejada performed a targeted prostate biopsy with the UroNav MRI fusion on 11/11/20, pathology confirmed Adenocarcinoma of the prostate, Prognostic Grade Group 4 (Boerne score 4+4=8). He is scheduled for ThuLEP with Dr. Brito on  12/9/21. \par \par 10/18/21 MRI at [place] \par -5.9 x 6.1 x 7.6 cm; volume 142 mL prostate with PIRADS 5 lesion measuring 1.6 cm Left posterolateral midgland peripheral zone. 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 \par \par 11/8/21 Bone scans  \par Findings: Focal radiotracer activity in the right clavicular head, probable right second rib, probable T6 vertebral body, and left sacrum, suspicious for osseous metastasis. Focal radiotracer activity in the L3 vertebral body, indeterminate. \par Symmetric bilateral renal radiotracer activity. No abnormal focal soft tissue activity. \par Distended urinary bladder with urinary radiotracer activity, obscuring pelvic bones, limiting evaluation. \par Impression: \par Several osseous metastases as described above. \par \par 11/11/21 \par Surgical Pathology Report  \par Final Diagnosis \par 1. Prostate, left anterior apex, biopsy \par -Benign prostate tissue. \par 2. Prostate, left anterior base, biopsy \par -Benign prostate tissue. \par 3. Prostate, right anterior apex, biopsy \par -Benign prostate tissue. \par 4. Prostate, right anterior base, biopsy \par -Benign prostate tissue. \par 5. Prostate, midline apex, biopsy \par -Benign prostate tissue. \par 6. Prostate, midline base, biopsy \par -Benign prostate tissue. \par 7. Prostate, left posterior apex, biopsy \par -Adenocarcinoma of the prostate, Prognostic Grade Group 4 (Boerne score 4+4=8) involving 70% (7.5 mm in length) of 1 of 1 core(s). \par 8. Prostate, left posterior base, biopsy \par -Benign prostate tissue. \par 9. Prostate, right posterior apex, biopsy \par -Adenocarcinoma of the prostate, Prognostic Grade Group 1 (Boerne score 3+3=6) involving less than 5% (less than 0.5 mm in length) of 1 of 1 core(s). \par 10. Prostate, right posterior base, biopsy \par -Benign prostate tissue. \par 11. Prostate, left lateral, biopsy \par -Benign prostate tissue. \par 12. Prostate, right lateral, biopsy \par -Benign prostate tissue. \par 13. Prostate, left mid PZPL, biopsy \par -Adenocarcinoma of the prostate, Prognostic Grade Group 4 (Maira score 4+4=8) involving 60% (5.5 mm in length) of 1 of 4 core(s). \par -Adenocarcinoma of the prostate, Prognostic Grade Group 1 (Maira score 3+3=6) involving 5% (0.5 mm in length) of 1 of 4 core(s). \par -A total of 2 of 4 cores involved by carcinoma. \par Note: Cribriform Boerne pattern 4 is present. \par \par MLH1:   Intact nuclear expression\par MSH2:   Intact nuclear expression\par MSH6:   Intact nuclear expression\par PMS2:   Intact nuclear expression\par \par Interpretation:  No loss of nuclear expression of MMR proteins (MLH1, MSH2, MSH6, and PMS2).   These results show low probability of microsatellite\par instability-high (MSI-H). There is no evidence of DNA mismatch repair deficiency in the analyzed tissue, indicating there is a low probability for Barrera syndrome (a\par common cause hereditary non polyposis colorectal cancer or HNPCC).\par \par 12/9/21 s/p laser enucleation of the prostate with morcellator\par Surgical Pathology Report \par Final Diagnosis\par 1. Prostate chips:\par - Benign prostatic hyperplasia and foci of prostatic adenocarcinoma (Maira's pattern 3+4 total score 7) grade group 2 involving less than 5% of the submitted tissue\par - Boerne's pattern 4 is less than 5% of the tumor\par 2. Bladder stone:\par - Calculus, gross diagnosis.\par - Specimen submitted for chemical analysis and will be reported separately\par \par 12/15/21 CT ABDOMEN AND PELVIS IC & CT CHEST IC\par Diffusely thick-walled urinary bladder which could be due to cystitis or bladder outlet obstruction. Perivesical fat infiltration suggesting cystitis.\par Enlarged prostate. Large TURP defect. No retroperitoneal or pelvic adenopathy.\par Indeterminate 1.9 cm cortical lesion in the lower pole left kidney. Could represent complicated cyst or tumor. Findings should be correlated with targeted renal sonography and/or MR.\par No intrathoracic adenopathy.\par Bone lesions T7, T11, L3 and L5 vertebral bodies and right second rib consistent with metastases.\par Staging for prostatic carcinoma at present appears to be T2, N0, M1b.\par \par 1/7/22 FO NGS\par CDK12 K418fs*14 \par \par Microsatellite status - MS-Stable\par Tumor Mutational [place] - 3 Muts/Mb\par Genomic Findings\par For a complete list of the genes assayed, please refer to the Appendix.\par CDK12 K418fs*14\par FGFR3 amplification\par FGFR4 amplification - equivocal†\par MDM2 amplification\par MDM4 amplification - equivocal†\par PIK3CA amplification\par CIC loss\par IGF1R amplification\par IKBKE amplification - equivocal†\par IRS2 amplification - equivocal†\par MYCL1 amplification - equivocal†\par [id number] amplification - equivocal†\par \par germline genetic testing negative\par \par 1/26/22 PSMA\par Multiple DCFPyL-avid osseous metastases.\par \par 6/30/22 CT CAP\par 1.Since 12/15/2021, decreased prostate size.\par 2. No change in the extent of osseous metastatic disease, though several of the bone metastases have become more sclerotic, which could reflect posttreatment change.\par 3. No change in size of a 1.9 cm left renal mass which is again more dense than a simple cyst. It could represent a hemorrhagic cyst or solid mass. Either continued follow-up recommended or definitive characterization with dedicated imaging.\par \par 2/1/23 CT CAP\par 1. Since June 13, 2022, unchanged osseous metastases.\par 2. No new suspicious finding. [de-identified] : Patient presents to clinic today to discuss treatment options as  [facility] has been on backorder and not sure when it will be available. He feels fine and denies any new problems including hematuria, abdominal pain, n/v/d/c, chest pain, sob, leg swelling or joint pain.   \par

## 2023-04-11 NOTE — ASSESSMENT
[FreeTextEntry1] : Mr. Ferrer is a 77 year old male with history of BPH, HTN, and HLD who presents to clinic today for evaluation of newly diagnosed met MARK prostate cancer Grade Grp 4 ([place] score 4+4=8) with bone mets, PSA 58 9/28/21 referred by Dr. Dubois. s/p ThuLEP on 12/9/21, path confirmed foci of prostatic adenocarcinoma (Maira's pattern 3+4 total score 7, grade group 2 ) He started Zytiga/prednisone on 12/16/21 Recieved lupron on 12/29/21   Here for f/u after PSMA scan on 1/26/22 showing multiple avid osseous metastases. Started Xgeva (120mg Q 3 months) on 5/24/22. 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 Started zytiga/pred on 12/16/21. \par -- PSA trending down, 0.02 on 2/8/23\par -- he tolerated abiraterone/prednisone + ADT with mild side effects (fatigue, hot flashes). \par --indefinite treatment planned in the setting of metastatic disease, as long as disease is controlled and treatment is tolerable.\par --switched to Yonsa due to drug cost. However, [facility]a has been on backorder but he was told that drug could be available on 4/17, but not sure at this time. He has only 3 days of pills left.  Drug cost has been the biggest issue for him.  Copay for abiraterone was unacceptably high when put through insurance;  other options are to obtain abiraterone from an online pharmacy out of pocket (eg [facility]) vs switch to Xtandi if [facility] is not available and abiraterone is cost prohibitive.  \par -- we discussed risks and side effects of xtandi in detail today and provided literature about the medication for him to read at home.  \par --unless [facility]a becomes available next week, he will call us for further plan. \par --follows with urology for depot leuprolide.  next due 7/11/23\par --Repeated CT CAP on 2/1/23 with stable disease \par \par # Bone metastases\par --receiving Xgeva every 3 months - last 2/7/23.  \par -- Advised to start Vitamin D supplementation. \par --vit D 29.5 on 11/8/22, completed vit D 50000x 2 months. Will repeat vit D at next visit.\par --follow up w/ dentist for routine care\par \par RTC  with Xgeva on 5/2/23\par labs- PSA, T, CBC, CMP, vit D\par \par

## 2023-04-11 NOTE — PHYSICAL EXAM
[Fully active, able to carry on all pre-disease performance without restriction] : Status 0 - Fully active, able to carry on all pre-disease performance without restriction [Thin] : thin [Normal] : affect appropriate [de-identified] : supple [de-identified] : normal RR, breathing pattern [de-identified] : normal HR [de-identified] : not distended.

## 2023-04-17 ENCOUNTER — NON-APPOINTMENT (OUTPATIENT)
Age: 78
End: 2023-04-17

## 2023-04-17 RX ORDER — ABIRATERONE ACETATE 125 MG/1
125 TABLET ORAL
Qty: 120 | Refills: 6 | Status: DISCONTINUED | COMMUNITY
Start: 2022-12-06 | End: 2023-04-17

## 2023-05-02 ENCOUNTER — APPOINTMENT (OUTPATIENT)
Dept: HEMATOLOGY ONCOLOGY | Facility: CLINIC | Age: 78
End: 2023-05-02
Payer: MEDICARE

## 2023-05-02 ENCOUNTER — LABORATORY RESULT (OUTPATIENT)
Age: 78
End: 2023-05-02

## 2023-05-02 ENCOUNTER — OUTPATIENT (OUTPATIENT)
Dept: OUTPATIENT SERVICES | Facility: HOSPITAL | Age: 78
LOS: 1 days | End: 2023-05-02
Payer: MEDICARE

## 2023-05-02 ENCOUNTER — APPOINTMENT (OUTPATIENT)
Dept: INFUSION THERAPY | Facility: CLINIC | Age: 78
End: 2023-05-02

## 2023-05-02 VITALS
SYSTOLIC BLOOD PRESSURE: 153 MMHG | WEIGHT: 168 LBS | HEIGHT: 66 IN | OXYGEN SATURATION: 97 % | BODY MASS INDEX: 27 KG/M2 | DIASTOLIC BLOOD PRESSURE: 88 MMHG | HEART RATE: 89 BPM

## 2023-05-02 VITALS
DIASTOLIC BLOOD PRESSURE: 76 MMHG | HEART RATE: 84 BPM | OXYGEN SATURATION: 98 % | TEMPERATURE: 98 F | SYSTOLIC BLOOD PRESSURE: 139 MMHG | RESPIRATION RATE: 18 BRPM

## 2023-05-02 DIAGNOSIS — C61 MALIGNANT NEOPLASM OF PROSTATE: ICD-10-CM

## 2023-05-02 PROCEDURE — 99213 OFFICE O/P EST LOW 20 MIN: CPT

## 2023-05-02 PROCEDURE — 96372 THER/PROPH/DIAG INJ SC/IM: CPT

## 2023-05-02 RX ADMIN — DENOSUMAB 120 MILLIGRAM(S): 60 INJECTION SUBCUTANEOUS at 14:55

## 2023-05-02 NOTE — HISTORY OF PRESENT ILLNESS
[de-identified] : Mr. 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 Dubois. s/p ThuLEP on 12/9/21, path confirmed foci of prostatic adenocarcinoma (Maira's pattern 3+4 total score 7, grade group 2 ) He started Zytiga/prednisone on 12/16/21  PSMA scan on 1/26/22 showed multiple avid osseous metastases. Started Xgeva(120mg Q 3 months) on 5/24/22. Today here for f/u and Xgeva. \par \par Colonoscopy in 2013, reportedly normal\par FMH- Sister with breast cancer, Half brother with prostate cancer\par \par ONC Hx\par Mr. Ferrer was being evaluated for 20 lb weight loss over 1 year with the inability to gain weight. He underwent CT scans which showed lower pole LEFT kidney 1.8 x 1.6 x 2.0 cm slightly exophytic solid enhancing mass, multiple bilateral simple cysts. markedly enlarged prostate gland with distended bladder and bladder diverticuli. PSA was 20.66 on 7/6/21. He had prostate biopsy with Dr. Melendrez  in 2011 which was negative. He was referred to Dr. Dubois on 9/28/21 for evaluation of kidney mass and elevated PSA. He notices weak stream, occasional staining to intimate stream, urinary frequency, and nocturia x 2.  He repeated PSA on 9/28/21 which was 58.40. He had MRI performed on 10/18/21 which showed 5.9 x 6.1 x 7.6 cm; volume 142 mL prostate with PIRADS 5 lesion measuring 1.6 cm Left posterolateral midgland peripheral zone. 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 Bone scans on 11/8/21 showed several osseous metastases. Dr. Tejada performed a targeted prostate biopsy with the UroNav MRI fusion on 11/11/20, pathology confirmed Adenocarcinoma of the prostate, Prognostic Grade Group 4 (Maira score 4+4=8). He is scheduled for ThuLEP with Dr. Brito on  12/9/21. \par \par 10/18/21 MRI at [place] \par -5.9 x 6.1 x 7.6 cm; volume 142 mL prostate with PIRADS 5 lesion measuring 1.6 cm Left posterolateral midgland peripheral zone. 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 \par \par 11/8/21 Bone scans  \par Findings: Focal radiotracer activity in the right clavicular head, probable right second rib, probable T6 vertebral body, and left sacrum, suspicious for osseous metastasis. Focal radiotracer activity in the L3 vertebral body, indeterminate. \par Symmetric bilateral renal radiotracer activity. No abnormal focal soft tissue activity. \par Distended urinary bladder with urinary radiotracer activity, obscuring pelvic bones, limiting evaluation. \par Impression: \par Several osseous metastases as described above. \par \par 11/11/21 \par Surgical Pathology Report  \par Final Diagnosis \par 1. Prostate, left anterior apex, biopsy \par -Benign prostate tissue. \par 2. Prostate, left anterior base, biopsy \par -Benign prostate tissue. \par 3. Prostate, right anterior apex, biopsy \par -Benign prostate tissue. \par 4. Prostate, right anterior base, biopsy \par -Benign prostate tissue. \par 5. Prostate, midline apex, biopsy \par -Benign prostate tissue. \par 6. Prostate, midline base, biopsy \par -Benign prostate tissue. \par 7. Prostate, left posterior apex, biopsy \par -Adenocarcinoma of the prostate, Prognostic Grade Group 4 (Maira score 4+4=8) involving 70% (7.5 mm in length) of 1 of 1 core(s). \par 8. Prostate, left posterior base, biopsy \par -Benign prostate tissue. \par 9. Prostate, right posterior apex, biopsy \par -Adenocarcinoma of the prostate, Prognostic Grade Group 1 (Maira score 3+3=6) involving less than 5% (less than 0.5 mm in length) of 1 of 1 core(s). \par 10. Prostate, right posterior base, biopsy \par -Benign prostate tissue. \par 11. Prostate, left lateral, biopsy \par -Benign prostate tissue. \par 12. Prostate, right lateral, biopsy \par -Benign prostate tissue. \par 13. Prostate, left mid PZPL, biopsy \par -Adenocarcinoma of the prostate, Prognostic Grade Group 4 (Maira score 4+4=8) involving 60% (5.5 mm in length) of 1 of 4 core(s). \par -Adenocarcinoma of the prostate, Prognostic Grade Group 1 (Maira score 3+3=6) involving 5% (0.5 mm in length) of 1 of 4 core(s). \par -A total of 2 of 4 cores involved by carcinoma. \par Note: Cribriform Maira pattern 4 is present. \par \par MLH1:   Intact nuclear expression\par MSH2:   Intact nuclear expression\par MSH6:   Intact nuclear expression\par PMS2:   Intact nuclear expression\par \par Interpretation:  No loss of nuclear expression of MMR proteins (MLH1, MSH2, MSH6, and PMS2).   These results show low probability of microsatellite\par instability-high (MSI-H). There is no evidence of DNA mismatch repair deficiency in the analyzed tissue, indicating there is a low probability for Barrera syndrome (a\par common cause hereditary non polyposis colorectal cancer or HNPCC).\par \par 12/9/21 s/p laser enucleation of the prostate with morcellator\par Surgical Pathology Report \par Final Diagnosis\par 1. Prostate chips:\par - Benign prostatic hyperplasia and foci of prostatic adenocarcinoma (Maira's pattern 3+4 total score 7) grade group 2 involving less than 5% of the submitted tissue\par - Glennie's pattern 4 is less than 5% of the tumor\par 2. Bladder stone:\par - Calculus, gross diagnosis.\par - Specimen submitted for chemical analysis and will be reported separately\par \par 12/15/21 CT ABDOMEN AND PELVIS IC & CT CHEST IC\par Diffusely thick-walled urinary bladder which could be due to cystitis or bladder outlet obstruction. Perivesical fat infiltration suggesting cystitis.\par Enlarged prostate. Large TURP defect. No retroperitoneal or pelvic adenopathy.\par Indeterminate 1.9 cm cortical lesion in the lower pole left kidney. Could represent complicated cyst or tumor. Findings should be correlated with targeted renal sonography and/or MR.\par No intrathoracic adenopathy.\par Bone lesions T7, T11, L3 and L5 vertebral bodies and right second rib consistent with metastases.\par Staging for prostatic carcinoma at present appears to be T2, N0, M1b.\par \par 1/7/22 FO NGS\par CDK12 K418fs*14 \par \par Microsatellite status - MS-Stable\par Tumor Mutational [address] - 3 Muts/Mb\par Genomic Findings\par For a complete list of the genes assayed, please refer to the Appendix.\par CDK12 K418fs*14\par FGFR3 amplification\par FGFR4 amplification - equivocal†\par MDM2 amplification\par MDM4 amplification - equivocal†\par PIK3CA amplification\par CIC loss\par IGF1R amplification\par IKBKE amplification - equivocal†\par IRS2 amplification - equivocal†\par MYCL1 amplification - equivocal†\par [id number] amplification - equivocal†\par \par germline genetic testing negative\par \par 1/26/22 PSMA\par Multiple DCFPyL-avid osseous metastases.\par \par 6/30/22 CT CAP\par 1.Since 12/15/2021, decreased prostate size.\par 2. No change in the extent of osseous metastatic disease, though several of the bone metastases have become more sclerotic, which could reflect posttreatment change.\par 3. No change in size of a 1.9 cm left renal mass which is again more dense than a simple cyst. It could represent a hemorrhagic cyst or solid mass. Either continued follow-up recommended or definitive characterization with dedicated imaging.\par \par 2/1/23 CT CAP\par 1. Since June 13, 2022, unchanged osseous metastases.\par 2. No new suspicious finding. [de-identified] : Patient presents to clinic today for follow up with Xgeva.  He has been taking abiraterone/ [facility]nisone from [facility] due to the shortage of [facility].  He reports occasional headache. /88. He denies  hematuria, abdominal pain, n/v/d/c, chest pain, sob, leg swelling or joint pain.   \par

## 2023-05-02 NOTE — ASSESSMENT
[FreeTextEntry1] : Mr. Ferrer is a 77 year old male with history of BPH, HTN, and HLD who presents to clinic today for evaluation of newly diagnosed met MARK prostate cancer Grade Grp 4 ([place] score 4+4=8) with bone mets, PSA 58 9/28/21 referred by Dr. Dubois. s/p ThuLEP on 12/9/21, path confirmed foci of prostatic adenocarcinoma (Maira's pattern 3+4 total score 7, grade group 2 ) He started Zytiga/prednisone on 12/16/21 Recieved lupron on 12/29/21   Here for f/u after PSMA scan on 1/26/22 showing multiple avid osseous metastases. Started Xgeva (120mg Q 3 months) on 5/24/22. 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 Started zytiga/pred on 12/16/21. \par -- PSA trending down, 0.02 on 2/8/23, pending today \par -- he tolerated abiraterone/prednisone + ADT with mild side effects (fatigue, hot flashes). \par --indefinite treatment planned in the setting of metastatic disease, as long as disease is controlled and treatment is tolerable.\par --switched to [facility]a due to drug cost. However, Y[facility]a has been on backorder. Drug cost has been the biggest issue for him.  Copay for abiraterone was unacceptably high when put through insurance;  other options are to obtain abiraterone from an online pharmacy out of pocket (eg [facility]) vs switch to Xtandi if [facility] is not available and abiraterone is cost prohibitive. He started abiraterone 1000mg/prednisone 5mg from [facility]. \par --follows with urology for depot leuprolide.  next due 7/11/23\par --repeated CT CAP on 2/1/23 with stable disease \par --continue on abiraterone/prednisone/ADT \par \par # Bone metastases\par --receiving Xgeva every 3 months - will give today. \par -- Advised to start Vitamin D supplementation. \par --vit D 29.5 on 11/8/22, completed vit D 50000x 2 months. will repeat today\par --follow up w/ dentist for routine care\par \par # elevated creatinine \par --Cr 1.5, GFR 48\par --ok for Xgeva and monitor for now\par \par RTC with Xgeva on 7/26/23\par labs- PSA, T, CBC, CMP\par \par

## 2023-05-02 NOTE — PHYSICAL EXAM
[Fully active, able to carry on all pre-disease performance without restriction] : Status 0 - Fully active, able to carry on all pre-disease performance without restriction [Thin] : thin [Normal] : normal appearance, no rash, nodules, vesicles, ulcers, erythema [de-identified] : not distended.

## 2023-05-02 NOTE — PHYSICAL EXAM
[Fully active, able to carry on all pre-disease performance without restriction] : Status 0 - Fully active, able to carry on all pre-disease performance without restriction [Thin] : thin [Normal] : normal appearance, no rash, nodules, vesicles, ulcers, erythema [de-identified] : not distended.

## 2023-05-02 NOTE — ASSESSMENT
[FreeTextEntry1] : Mr. Ferrer is a 77 year old male with history of BPH, HTN, and HLD who presents to clinic today for evaluation of newly diagnosed met MARK prostate cancer Grade Grp 4 ([place] score 4+4=8) with bone mets, PSA 58 9/28/21 referred by Dr. Dubois. s/p ThuLEP on 12/9/21, path confirmed foci of prostatic adenocarcinoma (Maira's pattern 3+4 total score 7, grade group 2 ) He started Zytiga/prednisone on 12/16/21 Recieved lupron on 12/29/21   Here for f/u after PSMA scan on 1/26/22 showing multiple avid osseous metastases. Started Xgeva (120mg Q 3 months) on 5/24/22. 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 Started zytiga/pred on 12/16/21. \par -- PSA trending down, 0.02 on 2/8/23, pending today \par -- he tolerated abiraterone/prednisone + ADT with mild side effects (fatigue, hot flashes). \par --indefinite treatment planned in the setting of metastatic disease, as long as disease is controlled and treatment is tolerable.\par --switched to [facility] due to drug cost. However, [facility] has been on backorder. Drug cost has been the biggest issue for him.  Copay for abiraterone was unacceptably high when put through insurance;  other options are to obtain abiraterone from an online pharmacy out of pocket (eg [facility]) vs switch to Xtandi if [facility]a is not available and abiraterone is cost prohibitive. He started abiraterone 1000mg/prednisone 5mg from [facility]. \par --follows with urology for depot leuprolide.  next due 7/11/23\par --repeated CT CAP on 2/1/23 with stable disease \par --continue on abiraterone/prednisone/ADT \par \par # Bone metastases\par --receiving Xgeva every 3 months - will give today. \par -- Advised to start Vitamin D supplementation. \par --vit D 29.5 on 11/8/22, completed vit D 50000x 2 months. will repeat today\par --follow up w/ dentist for routine care\par \par # elevated creatinine \par --Cr 1.5, GFR 48\par --ok for Xgeva and monitor for now\par \par RTC with Xgeva on 7/26/23\par labs- PSA, T, CBC, CMP\par \par

## 2023-05-02 NOTE — REVIEW OF SYSTEMS
[Negative] : Allergic/Immunologic [Confused] : no confusion [Dizziness] : no dizziness [Fainting] : no fainting [Difficulty Walking] : no difficulty walking [de-identified] : occasional headache

## 2023-05-02 NOTE — REVIEW OF SYSTEMS
[Negative] : Allergic/Immunologic [Confused] : no confusion [Dizziness] : no dizziness [Fainting] : no fainting [Difficulty Walking] : no difficulty walking [de-identified] : occasional headache

## 2023-05-02 NOTE — HISTORY OF PRESENT ILLNESS
[de-identified] : Mr. 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 Dubois. s/p ThuLEP on 12/9/21, path confirmed foci of prostatic adenocarcinoma (Maira's pattern 3+4 total score 7, grade group 2 ) He started Zytiga/prednisone on 12/16/21  PSMA scan on 1/26/22 showed multiple avid osseous metastases. Started Xgeva(120mg Q 3 months) on 5/24/22. Today here for f/u and Xgeva. \par \par Colonoscopy in 2013, reportedly normal\par FMH- Sister with breast cancer, Half brother with prostate cancer\par \par ONC Hx\par Mr. Ferrer was being evaluated for 20 lb weight loss over 1 year with the inability to gain weight. He underwent CT scans which showed lower pole LEFT kidney 1.8 x 1.6 x 2.0 cm slightly exophytic solid enhancing mass, multiple bilateral simple cysts. markedly enlarged prostate gland with distended bladder and bladder diverticuli. PSA was 20.66 on 7/6/21. He had prostate biopsy with Dr. Melendrez  in 2011 which was negative. He was referred to Dr. Dubois on 9/28/21 for evaluation of kidney mass and elevated PSA. He notices weak stream, occasional staining to intimate stream, urinary frequency, and nocturia x 2.  He repeated PSA on 9/28/21 which was 58.40. He had MRI performed on 10/18/21 which showed 5.9 x 6.1 x 7.6 cm; volume 142 mL prostate with PIRADS 5 lesion measuring 1.6 cm Left posterolateral midgland peripheral zone. 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 Bone scans on 11/8/21 showed several osseous metastases. Dr. Tejada performed a targeted prostate biopsy with the UroNav MRI fusion on 11/11/20, pathology confirmed Adenocarcinoma of the prostate, Prognostic Grade Group 4 (Maira score 4+4=8). He is scheduled for ThuLEP with Dr. Brito on  12/9/21. \par \par 10/18/21 MRI at [facility] \par -5.9 x 6.1 x 7.6 cm; volume 142 mL prostate with PIRADS 5 lesion measuring 1.6 cm Left posterolateral midgland peripheral zone. 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 \par \par 11/8/21 Bone scans  \par Findings: Focal radiotracer activity in the right clavicular head, probable right second rib, probable T6 vertebral body, and left sacrum, suspicious for osseous metastasis. Focal radiotracer activity in the L3 vertebral body, indeterminate. \par Symmetric bilateral renal radiotracer activity. No abnormal focal soft tissue activity. \par Distended urinary bladder with urinary radiotracer activity, obscuring pelvic bones, limiting evaluation. \par Impression: \par Several osseous metastases as described above. \par \par 11/11/21 \par Surgical Pathology Report  \par Final Diagnosis \par 1. Prostate, left anterior apex, biopsy \par -Benign prostate tissue. \par 2. Prostate, left anterior base, biopsy \par -Benign prostate tissue. \par 3. Prostate, right anterior apex, biopsy \par -Benign prostate tissue. \par 4. Prostate, right anterior base, biopsy \par -Benign prostate tissue. \par 5. Prostate, midline apex, biopsy \par -Benign prostate tissue. \par 6. Prostate, midline base, biopsy \par -Benign prostate tissue. \par 7. Prostate, left posterior apex, biopsy \par -Adenocarcinoma of the prostate, Prognostic Grade Group 4 (Maira score 4+4=8) involving 70% (7.5 mm in length) of 1 of 1 core(s). \par 8. Prostate, left posterior base, biopsy \par -Benign prostate tissue. \par 9. Prostate, right posterior apex, biopsy \par -Adenocarcinoma of the prostate, Prognostic Grade Group 1 (Maira score 3+3=6) involving less than 5% (less than 0.5 mm in length) of 1 of 1 core(s). \par 10. Prostate, right posterior base, biopsy \par -Benign prostate tissue. \par 11. Prostate, left lateral, biopsy \par -Benign prostate tissue. \par 12. Prostate, right lateral, biopsy \par -Benign prostate tissue. \par 13. Prostate, left mid PZPL, biopsy \par -Adenocarcinoma of the prostate, Prognostic Grade Group 4 (Maira score 4+4=8) involving 60% (5.5 mm in length) of 1 of 4 core(s). \par -Adenocarcinoma of the prostate, Prognostic Grade Group 1 (Maira score 3+3=6) involving 5% (0.5 mm in length) of 1 of 4 core(s). \par -A total of 2 of 4 cores involved by carcinoma. \par Note: Cribriform Maira pattern 4 is present. \par \par MLH1:   Intact nuclear expression\par MSH2:   Intact nuclear expression\par MSH6:   Intact nuclear expression\par PMS2:   Intact nuclear expression\par \par Interpretation:  No loss of nuclear expression of MMR proteins (MLH1, MSH2, MSH6, and PMS2).   These results show low probability of microsatellite\par instability-high (MSI-H). There is no evidence of DNA mismatch repair deficiency in the analyzed tissue, indicating there is a low probability for Barrera syndrome (a\par common cause hereditary non polyposis colorectal cancer or HNPCC).\par \par 12/9/21 s/p laser enucleation of the prostate with morcellator\par Surgical Pathology Report \par Final Diagnosis\par 1. Prostate chips:\par - Benign prostatic hyperplasia and foci of prostatic adenocarcinoma (Maira's pattern 3+4 total score 7) grade group 2 involving less than 5% of the submitted tissue\par - [place]'s pattern 4 is less than 5% of the tumor\par 2. Bladder stone:\par - Calculus, gross diagnosis.\par - Specimen submitted for chemical analysis and will be reported separately\par \par 12/15/21 CT ABDOMEN AND PELVIS IC & CT CHEST IC\par Diffusely thick-walled urinary bladder which could be due to cystitis or bladder outlet obstruction. Perivesical fat infiltration suggesting cystitis.\par Enlarged prostate. Large TURP defect. No retroperitoneal or pelvic adenopathy.\par Indeterminate 1.9 cm cortical lesion in the lower pole left kidney. Could represent complicated cyst or tumor. Findings should be correlated with targeted renal sonography and/or MR.\par No intrathoracic adenopathy.\par Bone lesions T7, T11, L3 and L5 vertebral bodies and right second rib consistent with metastases.\par Staging for prostatic carcinoma at present appears to be T2, N0, M1b.\par \par 1/7/22 FO NGS\par CDK12 K418fs*14 \par \par Microsatellite status - MS-Stable\par Tumor Mutational [place] - 3 Muts/Mb\par Genomic Findings\par For a complete list of the genes assayed, please refer to the Appendix.\par CDK12 K418fs*14\par FGFR3 amplification\par FGFR4 amplification - equivocal†\par MDM2 amplification\par MDM4 amplification - equivocal†\par PIK3CA amplification\par CIC loss\par IGF1R amplification\par IKBKE amplification - equivocal†\par IRS2 amplification - equivocal†\par MYCL1 amplification - equivocal†\par [id number] amplification - equivocal†\par \par germline genetic testing negative\par \par 1/26/22 PSMA\par Multiple DCFPyL-avid osseous metastases.\par \par 6/30/22 CT CAP\par 1.Since 12/15/2021, decreased prostate size.\par 2. No change in the extent of osseous metastatic disease, though several of the bone metastases have become more sclerotic, which could reflect posttreatment change.\par 3. No change in size of a 1.9 cm left renal mass which is again more dense than a simple cyst. It could represent a hemorrhagic cyst or solid mass. Either continued follow-up recommended or definitive characterization with dedicated imaging.\par \par 2/1/23 CT CAP\par 1. Since June 13, 2022, unchanged osseous metastases.\par 2. No new suspicious finding. [de-identified] : Patient presents to clinic today for follow up with Xgeva.  He has been taking abiraterone/ prednisone from [facility] due to the shortage of Yonsa.  He reports occasional headache. /88. He denies  hematuria, abdominal pain, n/v/d/c, chest pain, sob, leg swelling or joint pain.   \par

## 2023-05-03 ENCOUNTER — NON-APPOINTMENT (OUTPATIENT)
Age: 78
End: 2023-05-03

## 2023-05-03 LAB
25(OH)D3 SERPL-MCNC: 40.6 NG/ML
PSA SERPL-MCNC: <0.01 NG/ML
TESTOST SERPL-MCNC: <2.5 NG/DL

## 2023-05-03 RX ORDER — ERGOCALCIFEROL 1.25 MG/1
1.25 MG CAPSULE ORAL
Qty: 4 | Refills: 1 | Status: DISCONTINUED | COMMUNITY
Start: 2022-11-09 | End: 2023-05-03

## 2023-07-09 ENCOUNTER — RX RENEWAL (OUTPATIENT)
Age: 78
End: 2023-07-09

## 2023-07-11 ENCOUNTER — APPOINTMENT (OUTPATIENT)
Dept: UROLOGY | Facility: CLINIC | Age: 78
End: 2023-07-11
Payer: MEDICARE

## 2023-07-11 VITALS
OXYGEN SATURATION: 99 % | DIASTOLIC BLOOD PRESSURE: 84 MMHG | SYSTOLIC BLOOD PRESSURE: 158 MMHG | TEMPERATURE: 97.2 F | HEIGHT: 66 IN | HEART RATE: 74 BPM | BODY MASS INDEX: 27 KG/M2 | WEIGHT: 168 LBS

## 2023-07-11 PROCEDURE — 96402 CHEMO HORMON ANTINEOPL SQ/IM: CPT

## 2023-07-11 PROCEDURE — 99213 OFFICE O/P EST LOW 20 MIN: CPT | Mod: 25

## 2023-07-11 RX ADMIN — LEUPROLIDE ACETATE 1 MG: KIT at 00:00

## 2023-07-11 NOTE — HISTORY OF PRESENT ILLNESS
[FreeTextEntry1] : 11/23/21: 76 year old man with BPH, history of urinary retention and newly diagnosed metastatic prostate cancer.\par \par Regarding prostate cancer, he had MRI at [facility] on 10/18/2021. 5.9 x 6.1 x 7.6 cm; volume 142 mL prostate with PIRADS 5 lesion measuring 1.6 cm Left posterolateral midgland peripheral zone. 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 The standard biopsy detected GG4 cancer which did overlap with the targeted biopsy. 2/12 cores. location(s). LPA GG4, RPA GG1\par \par \par Bone scan positive at multiple sites. He is scheduled to see Dr. Reed to discuss treatment for metastatic prostate cancer. He has not started ADT yet.\par \par He has long history of LUTS as well. He was taking saw palmetto for a long time. His symptoms are primarily weak stream, straining, urinary frequency, nocturia and need to defecate when urinating. He has elevated PVRs, up to 600 cc in the office. He went into urinary retention after prostate biopsy, but is now voiding. He started flomax and finasteride recently and has noticed improved urinary stream with those medications. No fevers, chills, dysuria, hematuria.\par \par 12/9/21: ThuLEP, procedure uncomplicated. Passed void trial POD 1.\par \par pathology: 91 grams, [place] 3+4 prostate cancer involving less than 5% of the tissue\par \par 12/29/21: Doing well. Voiding with strong stream, complete emptying, decreased frequency, urgency. No leakage. No hematuria. Started abiraterone/prednisone with Dr. Reed.\par \par IPSS 4, QOL 1, PVR 32\par \par 4/6/22: Doing well. Strong stream, complete emptying. No hematuria. Not wearing any pads. No stress incontinence. He does have occasional urge with small amount of leakage if he tries to hold for too long. This is infrequent. No hot flashes or bone pain. Remains on abiraterone/prednisone. Received lupron injection today.\par \par IPSS 7, QOL 2, PVR 12\par \par Uroflow: Voided volume 95.1, Qmax 24.5\par \par 7/6/22: Doing well. Symptoms unchanged from prior. Remains on lupron, zytiga and xgeva. CT A/P with no change in extent of osseous metastases, though lesions have become more sclerotic. There is a stable 1.9 cm left renal mass, which either represents a hemorrhagic cyst or solid mass.\par \par \par 10/5/22: Doing well. Urinary symptoms stable. Remains on lupron, zytiga and xgeva. No new complaints. Voiding with strong stream, complete emptying, no leakage.\par \par IPSS 5, QOL 2, PVR 24 cc\par \par 1/4/23: Doing well. Stable urinary symptoms. No leakage. \par \par IPSS: 3\par QOL; 2\par PVR: 17 cc\par \par 4/5/23: Doing well. Less nocturia since stopping drinking tea at night. No leakage. CT 2/2023 with no new metastases, left renal mass is 2 cm and stable in appearance.\par \par IPSS 2, QOL 2\par \par 7/11/23: Doing well. Improvement in nocturia and frequency. Improving urgency as well. No fevers, chills, dysuria, hematuria. \par \par PSA 7/6/21: 20.66 ng/ml; 9/28/21--58.4; 12/8/21--220; 12/22/21--78.3; 1/12/22--8.95; 2/9/22--0.67; 3/23/22--0.23; 5/25/22--0.09; 7/6/22--0.06; 11/8/22--0.03; 2/8/23--0.02; 5/2/23--<0.01\par \par \par \par \par \par

## 2023-07-11 NOTE — ASSESSMENT
[FreeTextEntry1] : 76 year old man with metastatic prostate cancer to bone with multiple sites, 140 cc prostate with chronic LUTS and recent urinary retention s/p ThuLEP 12/9/21. Passed void trial POD 1. Pathology with 91 grams of prostate tissue with [place] 3+4 prostate cancer in less than 5% of tissue. He is on abiraterone/prednisone and lupron every 3 months. He received lupron injection today. PSA now undetectable.  He is taking calcium/Vit D supplement. Doing well with strong stream, complete emptying, no stress leakage and no hematuria. CT A/P 6/30 with stable osseous lesions, possibly sclerotic from treatment. Stable 1.9 cm left renal mass, hemorrhagic cyst vs solid mass. CT 2/2023 with no new metastases and stable 2 cm renal mass. Surveillance is reasonable. \par  - F/U in 3 months for lupron injection\par  - F/U with Oncology for continued management of metastatic prostate cancer. \par

## 2023-07-12 ENCOUNTER — MED ADMIN CHARGE (OUTPATIENT)
Age: 78
End: 2023-07-12

## 2023-07-12 RX ORDER — LEUPROLIDE ACETATE 22.5 MG
22.5 KIT INTRAMUSCULAR
Qty: 1 | Refills: 0 | Status: COMPLETED | OUTPATIENT
Start: 2023-07-11

## 2023-07-26 ENCOUNTER — LABORATORY RESULT (OUTPATIENT)
Age: 78
End: 2023-07-26

## 2023-07-26 ENCOUNTER — APPOINTMENT (OUTPATIENT)
Dept: INFUSION THERAPY | Facility: CLINIC | Age: 78
End: 2023-07-26

## 2023-07-26 ENCOUNTER — APPOINTMENT (OUTPATIENT)
Dept: HEMATOLOGY ONCOLOGY | Facility: CLINIC | Age: 78
End: 2023-07-26
Payer: MEDICARE

## 2023-07-26 ENCOUNTER — OUTPATIENT (OUTPATIENT)
Dept: OUTPATIENT SERVICES | Facility: HOSPITAL | Age: 78
LOS: 1 days | End: 2023-07-26
Payer: MEDICARE

## 2023-07-26 VITALS
SYSTOLIC BLOOD PRESSURE: 145 MMHG | RESPIRATION RATE: 18 BRPM | WEIGHT: 169.09 LBS | HEART RATE: 83 BPM | HEIGHT: 66 IN | OXYGEN SATURATION: 98 % | DIASTOLIC BLOOD PRESSURE: 78 MMHG | TEMPERATURE: 97 F

## 2023-07-26 VITALS
RESPIRATION RATE: 18 BRPM | HEIGHT: 66 IN | DIASTOLIC BLOOD PRESSURE: 78 MMHG | OXYGEN SATURATION: 98 % | BODY MASS INDEX: 27.16 KG/M2 | SYSTOLIC BLOOD PRESSURE: 145 MMHG | HEART RATE: 83 BPM | WEIGHT: 169 LBS | TEMPERATURE: 97.3 F

## 2023-07-26 DIAGNOSIS — C61 MALIGNANT NEOPLASM OF PROSTATE: ICD-10-CM

## 2023-07-26 PROCEDURE — 96372 THER/PROPH/DIAG INJ SC/IM: CPT

## 2023-07-26 PROCEDURE — 36415 COLL VENOUS BLD VENIPUNCTURE: CPT

## 2023-07-26 PROCEDURE — 99213 OFFICE O/P EST LOW 20 MIN: CPT | Mod: 25

## 2023-07-26 RX ORDER — DENOSUMAB 60 MG/ML
120 INJECTION SUBCUTANEOUS ONCE
Refills: 0 | Status: COMPLETED | OUTPATIENT
Start: 2023-07-26 | End: 2023-07-26

## 2023-07-26 RX ADMIN — DENOSUMAB 120 MILLIGRAM(S): 60 INJECTION SUBCUTANEOUS at 13:57

## 2023-07-26 NOTE — HISTORY OF PRESENT ILLNESS
[de-identified] : Mr. 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 Dubois. s/p ThuLEP on 12/9/21, path confirmed foci of prostatic adenocarcinoma (Maira's pattern 3+4 total score 7, grade group 2 ) He started Zytiga/prednisone on 12/16/21  PSMA scan on 1/26/22 showed multiple avid osseous metastases. Started Xgeva(120mg Q 3 months) on 5/24/22. Today here for f/u and Xgeva. \par \par Colonoscopy in 2013, reportedly normal\par FMH- Sister with breast cancer, Half brother with prostate cancer\par \par ONC Hx\par Mr. Ferrer was being evaluated for 20 lb weight loss over 1 year with the inability to gain weight. He underwent CT scans which showed lower pole LEFT kidney 1.8 x 1.6 x 2.0 cm slightly exophytic solid enhancing mass, multiple bilateral simple cysts. markedly enlarged prostate gland with distended bladder and bladder diverticuli. PSA was 20.66 on 7/6/21. He had prostate biopsy with Dr. Melendrez  in 2011 which was negative. He was referred to Dr. Dubois on 9/28/21 for evaluation of kidney mass and elevated PSA. He notices weak stream, occasional staining to intimate stream, urinary frequency, and nocturia x 2.  He repeated PSA on 9/28/21 which was 58.40. He had MRI performed on 10/18/21 which showed 5.9 x 6.1 x 7.6 cm; volume 142 mL prostate with PIRADS 5 lesion measuring 1.6 cm Left posterolateral midgland peripheral zone. 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 Bone scans on 11/8/21 showed several osseous metastases. Dr. Tejada performed a targeted prostate biopsy with the UroNav MRI fusion on 11/11/20, pathology confirmed Adenocarcinoma of the prostate, Prognostic Grade Group 4 (Maira score 4+4=8). He is scheduled for ThuLEP with Dr. Brito on  12/9/21. \par \par 10/18/21 MRI at [place] \par -5.9 x 6.1 x 7.6 cm; volume 142 mL prostate with PIRADS 5 lesion measuring 1.6 cm Left posterolateral midgland peripheral zone. 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 \par \par 11/8/21 Bone scans  \par Findings: Focal radiotracer activity in the right clavicular head, probable right second rib, probable T6 vertebral body, and left sacrum, suspicious for osseous metastasis. Focal radiotracer activity in the L3 vertebral body, indeterminate. \par Symmetric bilateral renal radiotracer activity. No abnormal focal soft tissue activity. \par Distended urinary bladder with urinary radiotracer activity, obscuring pelvic bones, limiting evaluation. \par Impression: \par Several osseous metastases as described above. \par \par 11/11/21 \par Surgical Pathology Report  \par Final Diagnosis \par 1. Prostate, left anterior apex, biopsy \par -Benign prostate tissue. \par 2. Prostate, left anterior base, biopsy \par -Benign prostate tissue. \par 3. Prostate, right anterior apex, biopsy \par -Benign prostate tissue. \par 4. Prostate, right anterior base, biopsy \par -Benign prostate tissue. \par 5. Prostate, midline apex, biopsy \par -Benign prostate tissue. \par 6. Prostate, midline base, biopsy \par -Benign prostate tissue. \par 7. Prostate, left posterior apex, biopsy \par -Adenocarcinoma of the prostate, Prognostic Grade Group 4 (Maira score 4+4=8) involving 70% (7.5 mm in length) of 1 of 1 core(s). \par 8. Prostate, left posterior base, biopsy \par -Benign prostate tissue. \par 9. Prostate, right posterior apex, biopsy \par -Adenocarcinoma of the prostate, Prognostic Grade Group 1 (Maira score 3+3=6) involving less than 5% (less than 0.5 mm in length) of 1 of 1 core(s). \par 10. Prostate, right posterior base, biopsy \par -Benign prostate tissue. \par 11. Prostate, left lateral, biopsy \par -Benign prostate tissue. \par 12. Prostate, right lateral, biopsy \par -Benign prostate tissue. \par 13. Prostate, left mid PZPL, biopsy \par -Adenocarcinoma of the prostate, Prognostic Grade Group 4 (Maira score 4+4=8) involving 60% (5.5 mm in length) of 1 of 4 core(s). \par -Adenocarcinoma of the prostate, Prognostic Grade Group 1 (Maira score 3+3=6) involving 5% (0.5 mm in length) of 1 of 4 core(s). \par -A total of 2 of 4 cores involved by carcinoma. \par Note: Cribriform Maira pattern 4 is present. \par \par MLH1:   Intact nuclear expression\par MSH2:   Intact nuclear expression\par MSH6:   Intact nuclear expression\par PMS2:   Intact nuclear expression\par \par Interpretation:  No loss of nuclear expression of MMR proteins (MLH1, MSH2, MSH6, and PMS2).   These results show low probability of microsatellite\par instability-high (MSI-H). There is no evidence of DNA mismatch repair deficiency in the analyzed tissue, indicating there is a low probability for Barrera syndrome (a\par common cause hereditary non polyposis colorectal cancer or HNPCC).\par \par 12/9/21 s/p laser enucleation of the prostate with morcellator\par Surgical Pathology Report \par Final Diagnosis\par 1. Prostate chips:\par - Benign prostatic hyperplasia and foci of prostatic adenocarcinoma (Maira's pattern 3+4 total score 7) grade group 2 involving less than 5% of the submitted tissue\par - [place]'s pattern 4 is less than 5% of the tumor\par 2. Bladder stone:\par - Calculus, gross diagnosis.\par - Specimen submitted for chemical analysis and will be reported separately\par \par 12/15/21 CT ABDOMEN AND PELVIS IC & CT CHEST IC\par Diffusely thick-walled urinary bladder which could be due to cystitis or bladder outlet obstruction. Perivesical fat infiltration suggesting cystitis.\par Enlarged prostate. Large TURP defect. No retroperitoneal or pelvic adenopathy.\par Indeterminate 1.9 cm cortical lesion in the lower pole left kidney. Could represent complicated cyst or tumor. Findings should be correlated with targeted renal sonography and/or MR.\par No intrathoracic adenopathy.\par Bone lesions T7, T11, L3 and L5 vertebral bodies and right second rib consistent with metastases.\par Staging for prostatic carcinoma at present appears to be T2, N0, M1b.\par \par 1/7/22 FO NGS\par CDK12 K418fs*14 \par \par Microsatellite status - MS-Stable\par Tumor Mutational [place] - 3 Muts/Mb\par Genomic Findings\par For a complete list of the genes assayed, please refer to the Appendix.\par CDK12 K418fs*14\par FGFR3 amplification\par FGFR4 amplification - equivocal†\par MDM2 amplification\par MDM4 amplification - equivocal†\par PIK3CA amplification\par CIC loss\par IGF1R amplification\par IKBKE amplification - equivocal†\par IRS2 amplification - equivocal†\par MYCL1 amplification - equivocal†\par DCA2S3U amplification - equivocal†\par \par germline genetic testing negative\par \par 1/26/22 PSMA\par Multiple DCFPyL-avid osseous metastases.\par \par 6/30/22 CT CAP\par 1.Since 12/15/2021, decreased prostate size.\par 2. No change in the extent of osseous metastatic disease, though several of the bone metastases have become more sclerotic, which could reflect posttreatment change.\par 3. No change in size of a 1.9 cm left renal mass which is again more dense than a simple cyst. It could represent a hemorrhagic cyst or solid mass. Either continued follow-up recommended or definitive characterization with dedicated imaging.\par \par 2/1/23 CT CAP\par 1. Since June 13, 2022, unchanged osseous metastases.\par 2. No new suspicious finding. [de-identified] : Patient presents to clinic today for follow up with Xgeva.  He has been taking abiraterone/ prednis[facility] due to the shortage of [facility]. No issues with getting refills [facility]. Overall he feels good , weight stable but wants to lose weight. /78. He denies  hematuria, abdominal pain, n/v/d/c, chest pain, SOB, bone pain, or leg swelling or joint pain.   \par

## 2023-07-26 NOTE — REVIEW OF SYSTEMS
[Negative] : Allergic/Immunologic [Confused] : no confusion [Dizziness] : no dizziness [Fainting] : no fainting [Difficulty Walking] : no difficulty walking [Proptosis] : no proptosis [Hot Flashes] : hot flashes [Muscle Weakness] : no muscle weakness [Deepening Of The Voice] : no deepening of the voice [de-identified] : occasional headache

## 2023-07-26 NOTE — ASSESSMENT
[FreeTextEntry1] : Mr. Fererr is a 77 year old male with history of BPH, HTN, and HLD who presents to clinic today for evaluation of newly diagnosed met MARK prostate cancer Grade Grp 4 ([place] score 4+4=8) with bone mets, PSA 58 9/28/21 referred by Dr. Dubois. s/p ThuLEP on 12/9/21, path confirmed foci of prostatic adenocarcinoma (Maira's pattern 3+4 total score 7, grade group 2 ) He started Zytiga/prednisone on 12/16/21 Recieved lupron on 12/29/21   Here for f/u after PSMA scan on 1/26/22 showing multiple avid osseous metastases. Started Xgeva (120mg Q 3 months) on 5/24/22. 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 Started zytiga/pred on 12/16/21. \par -- PSA < 0.01 on 5/2/23, initially was 220 on 12/2021, pending today \par -- he is tolerating abiraterone/prednisone + ADT with mild side effects (fatigue, hot flashes). \par --indefinite treatment planned in the setting of metastatic disease, as long as disease is controlled and treatment is tolerable.\par --switched to Yonsa due to drug cost. However, Yonsa has been on backorder. Drug cost has been the biggest issue for him.  Copay for abiraterone was unacceptably high when put through insurance;  other options are to obtain abiraterone from an online pharmacy out of pocket (eg [facility]) vs switch to Xtandi if Yonsa is not available and abiraterone is cost prohibitive. He started abiraterone 1000mg/prednisone 5mg from [facility]. \par --follows with urology for depot leuprolide. received on 7/11/23, Due for 10/3/23\par --repeated CT CAP on 2/1/23 with stable disease, Will repeat CT scans if rising PSA or any worsening symptoms. \par --he is clinically doing well, continue on abiraterone/prednisone/ADT \par --CBC/CMP reviewed, looks good \par \par # Bone metastases\par --receiving Xgeva 120mg every 3 months - will give today. \par --continue daily Vitamin D \par --vit D 40.6 on 5/2/23\par --follow up w/ dentist for routine care\par \par RTC with Xgeva on 10/25/23\par labs- PSA, T, CBC, CMP\par \par

## 2023-07-27 LAB
ALBUMIN SERPL ELPH-MCNC: 3.3 G/DL
ALP BLD-CCNC: 41 U/L
ALT SERPL-CCNC: 14 U/L
ANION GAP SERPL CALC-SCNC: 2 MMOL/L
AST SERPL-CCNC: 18 U/L
BILIRUB SERPL-MCNC: 0.9 MG/DL
BUN SERPL-MCNC: 15 MG/DL
CALCIUM SERPL-MCNC: 9.9 MG/DL
CHLORIDE SERPL-SCNC: 110 MMOL/L
CO2 SERPL-SCNC: 28 MMOL/L
CREAT SERPL-MCNC: 0.9 MG/DL
EGFR: 88 ML/MIN/1.73M2
GLUCOSE SERPL-MCNC: 154 MG/DL
POTASSIUM SERPL-SCNC: 3.8 MMOL/L
PROT SERPL-MCNC: 7 G/DL
SODIUM SERPL-SCNC: 140 MMOL/L

## 2023-07-28 LAB
PSA SERPL-MCNC: <0.01 NG/ML
TESTOST SERPL-MCNC: <2.5 NG/DL

## 2023-08-24 ENCOUNTER — NON-APPOINTMENT (OUTPATIENT)
Age: 78
End: 2023-08-24

## 2023-09-15 ENCOUNTER — NON-APPOINTMENT (OUTPATIENT)
Age: 78
End: 2023-09-15

## 2023-09-15 ENCOUNTER — APPOINTMENT (OUTPATIENT)
Dept: HEART AND VASCULAR | Facility: CLINIC | Age: 78
End: 2023-09-15
Payer: MEDICARE

## 2023-09-15 VITALS
TEMPERATURE: 98.5 F | DIASTOLIC BLOOD PRESSURE: 88 MMHG | WEIGHT: 168.98 LBS | OXYGEN SATURATION: 98 % | BODY MASS INDEX: 27.16 KG/M2 | SYSTOLIC BLOOD PRESSURE: 144 MMHG | HEART RATE: 78 BPM | HEIGHT: 66 IN

## 2023-09-15 PROCEDURE — 99214 OFFICE O/P EST MOD 30 MIN: CPT

## 2023-09-15 PROCEDURE — 93000 ELECTROCARDIOGRAM COMPLETE: CPT

## 2023-09-15 RX ORDER — ATORVASTATIN CALCIUM 20 MG/1
20 TABLET, FILM COATED ORAL
Qty: 90 | Refills: 3 | Status: ACTIVE | COMMUNITY
Start: 2021-09-23 | End: 1900-01-01

## 2023-09-15 RX ORDER — AMLODIPINE BESYLATE 10 MG/1
10 TABLET ORAL
Qty: 90 | Refills: 3 | Status: ACTIVE | COMMUNITY
Start: 2022-06-19 | End: 1900-01-01

## 2023-10-03 ENCOUNTER — APPOINTMENT (OUTPATIENT)
Dept: UROLOGY | Facility: CLINIC | Age: 78
End: 2023-10-03
Payer: MEDICARE

## 2023-10-03 VITALS — SYSTOLIC BLOOD PRESSURE: 157 MMHG | HEART RATE: 81 BPM | DIASTOLIC BLOOD PRESSURE: 83 MMHG | OXYGEN SATURATION: 97 %

## 2023-10-03 PROCEDURE — 99213 OFFICE O/P EST LOW 20 MIN: CPT | Mod: 25

## 2023-10-03 PROCEDURE — 96402 CHEMO HORMON ANTINEOPL SQ/IM: CPT

## 2023-10-03 RX ADMIN — LEUPROLIDE ACETATE 1 MG: KIT at 00:00

## 2023-10-25 ENCOUNTER — OUTPATIENT (OUTPATIENT)
Dept: OUTPATIENT SERVICES | Facility: HOSPITAL | Age: 78
LOS: 1 days | End: 2023-10-25
Payer: MEDICARE

## 2023-10-25 ENCOUNTER — APPOINTMENT (OUTPATIENT)
Dept: INFUSION THERAPY | Facility: CLINIC | Age: 78
End: 2023-10-25

## 2023-10-25 ENCOUNTER — APPOINTMENT (OUTPATIENT)
Dept: HEMATOLOGY ONCOLOGY | Facility: CLINIC | Age: 78
End: 2023-10-25
Payer: MEDICARE

## 2023-10-25 VITALS
SYSTOLIC BLOOD PRESSURE: 157 MMHG | DIASTOLIC BLOOD PRESSURE: 89 MMHG | HEART RATE: 83 BPM | HEIGHT: 66 IN | WEIGHT: 173.94 LBS | TEMPERATURE: 98 F | OXYGEN SATURATION: 97 % | RESPIRATION RATE: 16 BRPM

## 2023-10-25 VITALS
HEIGHT: 66 IN | BODY MASS INDEX: 27.97 KG/M2 | TEMPERATURE: 98.5 F | WEIGHT: 174 LBS | HEART RATE: 83 BPM | SYSTOLIC BLOOD PRESSURE: 157 MMHG | OXYGEN SATURATION: 97 % | DIASTOLIC BLOOD PRESSURE: 89 MMHG | RESPIRATION RATE: 18 BRPM

## 2023-10-25 DIAGNOSIS — C61 MALIGNANT NEOPLASM OF PROSTATE: ICD-10-CM

## 2023-10-25 PROCEDURE — 99213 OFFICE O/P EST LOW 20 MIN: CPT | Mod: 25

## 2023-10-25 PROCEDURE — 96401 CHEMO ANTI-NEOPL SQ/IM: CPT

## 2023-10-25 PROCEDURE — 36415 COLL VENOUS BLD VENIPUNCTURE: CPT

## 2023-10-25 RX ADMIN — DENOSUMAB 120 MILLIGRAM(S): 60 INJECTION SUBCUTANEOUS at 14:16

## 2023-10-25 NOTE — DISCHARGE INSTRUCTIONS: GENERAL THERAPY - NSAPPTSTXINJECTIONDUEFTBOX_HEME_A_AM
Message from [employer]:  Nathan King would like a refill of the following medications:     amphetamine-dextroamphetamine (ADDERALL XR) 30 MG 24 hr capsule [Christiano Cardozo MD]    Preferred pharmacy: [facility] [address] [place] [address]  Delivery method: Pickup  Preferred pick-up date and time: 1/26/2018 9:00 AM    This message is being sent by Birdie King on behalf of Nathan King   12 weeks

## 2023-10-26 LAB
ALBUMIN SERPL ELPH-MCNC: 4.1 G/DL
ALP BLD-CCNC: 52 U/L
ALT SERPL-CCNC: 11 U/L
ANION GAP SERPL CALC-SCNC: 10 MMOL/L
AST SERPL-CCNC: 14 U/L
BASOPHILS # BLD AUTO: 0.05 K/UL
BASOPHILS NFR BLD AUTO: 0.6 %
BILIRUB SERPL-MCNC: 0.5 MG/DL
BUN SERPL-MCNC: 18 MG/DL
CALCIUM SERPL-MCNC: 9.6 MG/DL
CHLORIDE SERPL-SCNC: 110 MMOL/L
CO2 SERPL-SCNC: 24 MMOL/L
CREAT SERPL-MCNC: 1.19 MG/DL
EGFR: 63 ML/MIN/1.73M2
EOSINOPHIL # BLD AUTO: 0.11 K/UL
EOSINOPHIL NFR BLD AUTO: 1.3 %
GLUCOSE SERPL-MCNC: 107 MG/DL
HCT VFR BLD CALC: 39.8 %
HGB BLD-MCNC: 12.9 G/DL
IMM GRANULOCYTES NFR BLD AUTO: 0.7 %
LYMPHOCYTES # BLD AUTO: 4.21 K/UL
LYMPHOCYTES NFR BLD AUTO: 48.1 %
MAN DIFF?: NORMAL
MCHC RBC-ENTMCNC: 29.3 PG
MCHC RBC-ENTMCNC: 32.4 GM/DL
MCV RBC AUTO: 90.5 FL
MONOCYTES # BLD AUTO: 0.81 K/UL
MONOCYTES NFR BLD AUTO: 9.2 %
NEUTROPHILS # BLD AUTO: 3.52 K/UL
NEUTROPHILS NFR BLD AUTO: 40.1 %
PLATELET # BLD AUTO: 242 K/UL
POTASSIUM SERPL-SCNC: 4 MMOL/L
PROT SERPL-MCNC: 6.7 G/DL
PSA SERPL-MCNC: <0.01 NG/ML
RBC # BLD: 4.4 M/UL
RBC # FLD: 14 %
SODIUM SERPL-SCNC: 144 MMOL/L
WBC # FLD AUTO: 8.76 K/UL

## 2023-10-26 RX ORDER — CHOLECALCIFEROL (VITAMIN D3) 25 MCG
TABLET ORAL
Refills: 0 | Status: ACTIVE | COMMUNITY

## 2023-10-27 ENCOUNTER — NON-APPOINTMENT (OUTPATIENT)
Age: 78
End: 2023-10-27

## 2023-11-01 LAB
TESTOST FREE SERPL-MCNC: 0.1 PG/ML
TESTOST SERPL-MCNC: <2.5 NG/DL

## 2024-01-16 NOTE — REVIEW OF SYSTEMS
[Fever] : no fever [Chills] : no chills [Night Sweats] : no night sweats [Fatigue] : fatigue [Recent Change In Weight] : ~T recent weight change [Joint Pain] : no joint pain [Joint Stiffness] : no joint stiffness [Muscle Pain] : muscle pain [Confused] : no confusion [Dizziness] : no dizziness [Fainting] : no fainting [Difficulty Walking] : no difficulty walking [Proptosis] : no proptosis [Hot Flashes] : hot flashes [Muscle Weakness] : no muscle weakness [Deepening Of The Voice] : no deepening of the voice [Negative] : Allergic/Immunologic [FreeTextEntry9] : occasional back pain

## 2024-01-17 ENCOUNTER — APPOINTMENT (OUTPATIENT)
Dept: INFUSION THERAPY | Facility: CLINIC | Age: 79
End: 2024-01-17

## 2024-01-17 ENCOUNTER — APPOINTMENT (OUTPATIENT)
Dept: UROLOGY | Facility: CLINIC | Age: 79
End: 2024-01-17
Payer: MEDICARE

## 2024-01-17 ENCOUNTER — MED ADMIN CHARGE (OUTPATIENT)
Age: 79
End: 2024-01-17

## 2024-01-17 ENCOUNTER — OUTPATIENT (OUTPATIENT)
Dept: OUTPATIENT SERVICES | Facility: HOSPITAL | Age: 79
LOS: 1 days | End: 2024-01-17
Payer: MEDICARE

## 2024-01-17 ENCOUNTER — LABORATORY RESULT (OUTPATIENT)
Age: 79
End: 2024-01-17

## 2024-01-17 ENCOUNTER — APPOINTMENT (OUTPATIENT)
Dept: HEMATOLOGY ONCOLOGY | Facility: CLINIC | Age: 79
End: 2024-01-17
Payer: MEDICARE

## 2024-01-17 VITALS
HEIGHT: 66 IN | DIASTOLIC BLOOD PRESSURE: 88 MMHG | WEIGHT: 178 LBS | BODY MASS INDEX: 28.61 KG/M2 | HEART RATE: 102 BPM | TEMPERATURE: 97.9 F | SYSTOLIC BLOOD PRESSURE: 145 MMHG

## 2024-01-17 VITALS
OXYGEN SATURATION: 98 % | WEIGHT: 171.96 LBS | TEMPERATURE: 98 F | HEIGHT: 66 IN | HEART RATE: 86 BPM | SYSTOLIC BLOOD PRESSURE: 144 MMHG | DIASTOLIC BLOOD PRESSURE: 84 MMHG | RESPIRATION RATE: 17 BRPM

## 2024-01-17 VITALS
WEIGHT: 172 LBS | HEIGHT: 66 IN | BODY MASS INDEX: 27.64 KG/M2 | OXYGEN SATURATION: 97 % | HEART RATE: 89 BPM | TEMPERATURE: 97.4 F | DIASTOLIC BLOOD PRESSURE: 84 MMHG | RESPIRATION RATE: 18 BRPM | SYSTOLIC BLOOD PRESSURE: 144 MMHG

## 2024-01-17 DIAGNOSIS — Z79.890 HORMONE REPLACEMENT THERAPY: ICD-10-CM

## 2024-01-17 DIAGNOSIS — M54.9 DORSALGIA, UNSPECIFIED: ICD-10-CM

## 2024-01-17 DIAGNOSIS — G89.29 DORSALGIA, UNSPECIFIED: ICD-10-CM

## 2024-01-17 DIAGNOSIS — C61 MALIGNANT NEOPLASM OF PROSTATE: ICD-10-CM

## 2024-01-17 LAB
ALBUMIN SERPL ELPH-MCNC: 3.4 G/DL
ALP BLD-CCNC: 43 U/L
ALT SERPL-CCNC: 16 U/L
ANION GAP SERPL CALC-SCNC: 7 MMOL/L
AST SERPL-CCNC: 22 U/L
BILIRUB SERPL-MCNC: 0.7 MG/DL
BUN SERPL-MCNC: 16 MG/DL
CALCIUM SERPL-MCNC: 9.7 MG/DL
CHLORIDE SERPL-SCNC: 110 MMOL/L
CO2 SERPL-SCNC: 25 MMOL/L
CREAT SERPL-MCNC: 1 MG/DL
EGFR: 77 ML/MIN/1.73M2
GLUCOSE SERPL-MCNC: 109 MG/DL
POTASSIUM SERPL-SCNC: 4.1 MMOL/L
PROT SERPL-MCNC: 7.1 G/DL
SODIUM SERPL-SCNC: 142 MMOL/L

## 2024-01-17 PROCEDURE — 99213 OFFICE O/P EST LOW 20 MIN: CPT | Mod: 25

## 2024-01-17 PROCEDURE — 96372 THER/PROPH/DIAG INJ SC/IM: CPT

## 2024-01-17 PROCEDURE — 36415 COLL VENOUS BLD VENIPUNCTURE: CPT

## 2024-01-17 RX ORDER — LEUPROLIDE ACETATE 22.5 MG
22.5 KIT INTRAMUSCULAR
Qty: 1 | Refills: 0 | Status: COMPLETED | OUTPATIENT
Start: 2024-01-17

## 2024-01-17 RX ORDER — DENOSUMAB 60 MG/ML
120 INJECTION SUBCUTANEOUS ONCE
Refills: 0 | Status: COMPLETED | OUTPATIENT
Start: 2024-01-17 | End: 2024-01-17

## 2024-01-17 RX ADMIN — DENOSUMAB 120 MILLIGRAM(S): 60 INJECTION SUBCUTANEOUS at 14:08

## 2024-01-17 RX ADMIN — LEUPROLIDE ACETATE 0 MG: KIT at 00:00

## 2024-01-18 PROBLEM — Z79.890 H/O ONGOING TREATMENT WITH HORMONAL THERAPY: Status: ACTIVE | Noted: 2022-02-09

## 2024-01-18 PROBLEM — M54.9 BACK PAIN, CHRONIC: Status: ACTIVE | Noted: 2024-01-18

## 2024-01-18 LAB
25(OH)D3 SERPL-MCNC: 44.4 NG/ML
PSA SERPL-MCNC: <0.01 NG/ML
TESTOST FREE SERPL-MCNC: 0 PG/ML
TESTOST SERPL-MCNC: <2.5 NG/DL

## 2024-01-18 NOTE — ASSESSMENT
[FreeTextEntry1] : Mr. 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 Dubois. s/p ThuLEP on 12/9/21, path confirmed foci of prostatic adenocarcinoma (Maira's pattern 3+4 total score 7, grade group 2 ) He started Zytiga/prednisone on 12/16/21 Recieved lupron on 12/29/21 Here for f/u after PSMA scan on 1/26/22 showing multiple avid osseous metastases. Started Xgeva (120mg Q 3 months) on 5/24/22. 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 Started zytiga/pred on 12/16/21. -- PSA < 0.01 since 5/2023,  initially was 220 on 12/2021,  -- he is tolerating abiraterone/prednisone + ADT with mild side effects (fatigue, hot flashes). --indefinite treatment planned in the setting of metastatic disease, as long as disease is controlled and treatment is tolerable. --switched to Yonsa due to drug cost. However, Y[facility]a has been on backorder. Drug cost has been the biggest issue for him. Copay for abiraterone was unacceptably high when put through insurance; other options are to obtain abiraterone from an online pharmacy out of pocket (eg [facility]) vs switch to Xtandi if Y[facility]a is not available and abiraterone is cost prohibitive. He started abiraterone 1000mg/prednisone 5mg from [facility]. No issues so far.  --follows with urology for depot leuprolide. received today, due 4/17/24 --repeated CT CAP on 2/1/23 with stable disease, Will repeat CT scans if rising PSA or any worsening symptoms. --he is clinically doing well, no concerns at this visit, continue on abiraterone/prednisone/ADT --CBC/CMP reviewed, looks good, PSA undetectable   # bone metastases --receiving Xgeva 120mg every 3 months - will give today. --continue daily Vitamin D --vit D 44.4 today --follow up w/ dentist for routine care, no jaw pain   # back pain  --mild, Tylenol or NSAIDs as prrn  RTC with Xgeva on 4/17/24 labs- PSA, T, CBC, CMP

## 2024-01-18 NOTE — HISTORY OF PRESENT ILLNESS
[de-identified] : Mr. Ferrer is a 78 year old male with history of BPH, HTN, HLD who presents to clinic today for evaluation of metastatic MARK prostate cancer Grade Group 4 (Maira score 4+4=8) with bone mets, PSA 58 9/28/21 referred by Dr. Dubois. s/p ThuLEP on 12/9/21, path confirmed foci of prostatic adenocarcinoma (Maira's pattern 3+4 total score 7, grade group 2 ) He started Zytiga/prednisone on 12/16/21  PSMA scan on 1/26/22 showed multiple avid osseous metastases. Started Xgeva(120mg Q 3 months) on 5/24/22. Today here for f/u and Xgeva.   Colonoscopy in 2013, reportedly normal [facility]- Sister with breast cancer, Half brother with prostate cancer  ONC Hx Mr. Ferrer was being evaluated for 20 lb weight loss over 1 year with the inability to gain weight. He underwent CT scans which showed lower pole LEFT kidney 1.8 x 1.6 x 2.0 cm slightly exophytic solid enhancing mass, multiple bilateral simple cysts. markedly enlarged prostate gland with distended bladder and bladder diverticuli. PSA was 20.66 on 7/6/21. He had prostate biopsy with Dr. Melendrez  in 2011 which was negative. He was referred to Dr. Dubois on 9/28/21 for evaluation of kidney mass and elevated PSA. He notices weak stream, occasional staining to intimate stream, urinary frequency, and nocturia x 2.  He repeated PSA on 9/28/21 which was 58.40. He had MRI performed on 10/18/21 which showed 5.9 x 6.1 x 7.6 cm; volume 142 mL prostate with PIRADS 5 lesion measuring 1.6 cm Left posterolateral midgland peripheral zone. 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 Bone scans on 11/8/21 showed several osseous metastases. Dr. Tejaad performed a targeted prostate biopsy with the UroNav MRI fusion on 11/11/20, pathology confirmed Adenocarcinoma of the prostate, Prognostic Grade Group 4 (Maira score 4+4=8). He is scheduled for ThuLEP with Dr. Brito on  12/9/21.   10/18/21 MRI at [place]  -5.9 x 6.1 x 7.6 cm; volume 142 mL prostate with PIRADS 5 lesion measuring 1.6 cm Left posterolateral midgland peripheral zone. 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   11/8/21 Bone scans   Findings: Focal radiotracer activity in the right clavicular head, probable right second rib, probable T6 vertebral body, and left sacrum, suspicious for osseous metastasis. Focal radiotracer activity in the L3 vertebral body, indeterminate.  Symmetric bilateral renal radiotracer activity. No abnormal focal soft tissue activity.  Distended urinary bladder with urinary radiotracer activity, obscuring pelvic bones, limiting evaluation.  Impression:  Several osseous metastases as described above.   11/11/21  Surgical Pathology Report   Final Diagnosis  1. Prostate, left anterior apex, biopsy  -Benign prostate tissue.  2. Prostate, left anterior base, biopsy  -Benign prostate tissue.  3. Prostate, right anterior apex, biopsy  -Benign prostate tissue.  4. Prostate, right anterior base, biopsy  -Benign prostate tissue.  5. Prostate, midline apex, biopsy  -Benign prostate tissue.  6. Prostate, midline base, biopsy  -Benign prostate tissue.  7. Prostate, left posterior apex, biopsy  -Adenocarcinoma of the prostate, Prognostic Grade Group 4 (Maira score 4+4=8) involving 70% (7.5 mm in length) of 1 of 1 core(s).  8. Prostate, left posterior base, biopsy  -Benign prostate tissue.  9. Prostate, right posterior apex, biopsy  -Adenocarcinoma of the prostate, Prognostic Grade Group 1 ([place] score 3+3=6) involving less than 5% (less than 0.5 mm in length) of 1 of 1 core(s).  10. Prostate, right posterior base, biopsy  -Benign prostate tissue.  11. Prostate, left lateral, biopsy  -Benign prostate tissue.  12. Prostate, right lateral, biopsy  -Benign prostate tissue.  13. Prostate, left mid PZPL, biopsy  -Adenocarcinoma of the prostate, Prognostic Grade Group 4 ([place] score 4+4=8) involving 60% (5.5 mm in length) of 1 of 4 core(s).  -Adenocarcinoma of the prostate, Prognostic Grade Group 1 ([place] score 3+3=6) involving 5% (0.5 mm in length) of 1 of 4 core(s).  -A total of 2 of 4 cores involved by carcinoma.  Note: Cribriform Maira pattern 4 is present.   MLH1:   Intact nuclear expression MSH2:   Intact nuclear expression MSH6:   Intact nuclear expression PMS2:   Intact nuclear expression  Interpretation:  No loss of nuclear expression of MMR proteins (MLH1, MSH2, MSH6, and PMS2).   These results show low probability of microsatellite instability-high (MSI-H). There is no evidence of DNA mismatch repair deficiency in the analyzed tissue, indicating there is a low probability for Barrera syndrome (a common cause hereditary non polyposis colorectal cancer or HNPCC).  12/9/21 s/p laser enucleation of the prostate with morcellator Surgical Pathology Report  Final Diagnosis 1. Prostate chips: - Benign prostatic hyperplasia and foci of prostatic adenocarcinoma ([place]ow[place]'s pattern 3+4 total score 7) grade group 2 involving less than 5% of the submitted tissue - Maira's pattern 4 is less than 5% of the tumor 2. Bladder stone: - Calculus, gross diagnosis. - Specimen submitted for chemical analysis and will be reported separately  12/15/21 CT ABDOMEN AND PELVIS IC & CT CHEST IC Diffusely thick-walled urinary bladder which could be due to cystitis or bladder outlet obstruction. Perivesical fat infiltration suggesting cystitis. Enlarged prostate. Large TURP defect. No retroperitoneal or pelvic adenopathy. Indeterminate 1.9 cm cortical lesion in the lower pole left kidney. Could represent complicated cyst or tumor. Findings should be correlated with targeted renal sonography and/or MR. No intrathoracic adenopathy. Bone lesions T7, T11, L3 and L5 vertebral bodies and right second rib consistent with metastases. Staging for prostatic carcinoma at present appears to be T2, N0, M1b.  1/7/22 FO NGS CDK12 K418fs*14   Microsatellite status - MS-Stable Tumor Mutational [place] - 3 Muts/Mb Genomic Findings For a complete list of the genes assayed, please refer to the Appendix. CDK12 K418fs*14 FGFR3 amplification FGFR4 amplification - equivocalA? MDM2 amplification MDM4 amplification - equivocalA? PIK3CA amplification CIC loss IGF1R amplification IKBKE amplification - equivocalA? IRS2 amplification - equivocalA? MYCL1 amplification - equivocalA? [id number] amplification - equivocalA?  germline genetic testing negative  1/26/22 PSMA Multiple DCFPyL-avid osseous metastases.  6/30/22 CT CAP 1.Since 12/15/2021, decreased prostate size. 2. No change in the extent of osseous metastatic disease, though several of the bone metastases have become more sclerotic, which could reflect posttreatment change. 3. No change in size of a 1.9 cm left renal mass which is again more dense than a simple cyst. It could represent a hemorrhagic cyst or solid mass. Either continued follow-up recommended or definitive characterization with dedicated imaging.  2/1/23 CT CAP 1. Since June 13, 2022, unchanged osseous metastases. 2. No new suspicious finding. [de-identified] : Patient presents to clinic today for follow up with Samantha. He has been compliant with abiraterone/prednisone, [facility] due to the shortage of [facility].  He reports mild left sided lower back pain x 3 weeks, started after stretching, does not need to take any pain pill.  Otherwise he feels good. eating well denies any urinary symptoms or dental issues, BM good.  + mild fatigue and hot flashes

## 2024-01-25 ENCOUNTER — APPOINTMENT (OUTPATIENT)
Dept: HEART AND VASCULAR | Facility: CLINIC | Age: 79
End: 2024-01-25
Payer: MEDICARE

## 2024-01-25 VITALS
SYSTOLIC BLOOD PRESSURE: 150 MMHG | BODY MASS INDEX: 27.64 KG/M2 | OXYGEN SATURATION: 96 % | HEIGHT: 66 IN | WEIGHT: 172 LBS | HEART RATE: 77 BPM | DIASTOLIC BLOOD PRESSURE: 80 MMHG | TEMPERATURE: 98.6 F

## 2024-01-25 PROCEDURE — G2211 COMPLEX E/M VISIT ADD ON: CPT

## 2024-01-25 PROCEDURE — 99214 OFFICE O/P EST MOD 30 MIN: CPT

## 2024-01-25 RX ORDER — FAMOTIDINE 20 MG/1
20 TABLET, FILM COATED ORAL
Qty: 90 | Refills: 3 | Status: ACTIVE | COMMUNITY
Start: 2024-01-25 | End: 1900-01-01

## 2024-02-07 ENCOUNTER — APPOINTMENT (OUTPATIENT)
Dept: HEART AND VASCULAR | Facility: CLINIC | Age: 79
End: 2024-02-07
Payer: MEDICARE

## 2024-02-07 VITALS
HEART RATE: 84 BPM | HEIGHT: 66 IN | SYSTOLIC BLOOD PRESSURE: 132 MMHG | TEMPERATURE: 97.7 F | DIASTOLIC BLOOD PRESSURE: 80 MMHG | BODY MASS INDEX: 27.32 KG/M2 | WEIGHT: 170 LBS | OXYGEN SATURATION: 98 %

## 2024-02-07 PROCEDURE — 99213 OFFICE O/P EST LOW 20 MIN: CPT

## 2024-02-07 PROCEDURE — G2211 COMPLEX E/M VISIT ADD ON: CPT

## 2024-04-02 RX ORDER — LOSARTAN POTASSIUM 50 MG/1
50 TABLET, FILM COATED ORAL
Qty: 90 | Refills: 3 | Status: ACTIVE | COMMUNITY
Start: 2024-01-25 | End: 1900-01-01

## 2024-04-17 ENCOUNTER — OUTPATIENT (OUTPATIENT)
Dept: OUTPATIENT SERVICES | Facility: HOSPITAL | Age: 79
LOS: 1 days | End: 2024-04-17
Payer: MEDICARE

## 2024-04-17 ENCOUNTER — APPOINTMENT (OUTPATIENT)
Dept: HEMATOLOGY ONCOLOGY | Facility: CLINIC | Age: 79
End: 2024-04-17
Payer: MEDICARE

## 2024-04-17 ENCOUNTER — APPOINTMENT (OUTPATIENT)
Dept: UROLOGY | Facility: CLINIC | Age: 79
End: 2024-04-17
Payer: MEDICARE

## 2024-04-17 ENCOUNTER — LABORATORY RESULT (OUTPATIENT)
Age: 79
End: 2024-04-17

## 2024-04-17 ENCOUNTER — APPOINTMENT (OUTPATIENT)
Dept: INFUSION THERAPY | Facility: CLINIC | Age: 79
End: 2024-04-17

## 2024-04-17 ENCOUNTER — MED ADMIN CHARGE (OUTPATIENT)
Age: 79
End: 2024-04-17

## 2024-04-17 VITALS
HEART RATE: 80 BPM | DIASTOLIC BLOOD PRESSURE: 81 MMHG | BODY MASS INDEX: 27.32 KG/M2 | WEIGHT: 170 LBS | HEIGHT: 66 IN | SYSTOLIC BLOOD PRESSURE: 149 MMHG | TEMPERATURE: 97.7 F

## 2024-04-17 VITALS
HEIGHT: 66 IN | TEMPERATURE: 98 F | OXYGEN SATURATION: 95 % | HEART RATE: 70 BPM | WEIGHT: 173.94 LBS | DIASTOLIC BLOOD PRESSURE: 80 MMHG | RESPIRATION RATE: 18 BRPM | SYSTOLIC BLOOD PRESSURE: 148 MMHG

## 2024-04-17 DIAGNOSIS — C61 MALIGNANT NEOPLASM OF PROSTATE: ICD-10-CM

## 2024-04-17 DIAGNOSIS — Z79.818 LONG TERM (CURRENT) USE OF OTHER AGENTS AFFECTING ESTROGEN RECEPTORS AND ESTROGEN LEVELS: ICD-10-CM

## 2024-04-17 DIAGNOSIS — C79.51 MALIGNANT NEOPLASM OF PROSTATE: ICD-10-CM

## 2024-04-17 LAB
ALBUMIN SERPL ELPH-MCNC: 3.4 G/DL
ALP BLD-CCNC: 39 U/L
ALT SERPL-CCNC: 17 U/L
ANION GAP SERPL CALC-SCNC: -1 MMOL/L
AST SERPL-CCNC: 22 U/L
BILIRUB SERPL-MCNC: 0.8 MG/DL
BUN SERPL-MCNC: 17 MG/DL
CALCIUM SERPL-MCNC: 9.9 MG/DL
CHLORIDE SERPL-SCNC: 111 MMOL/L
CO2 SERPL-SCNC: 31 MMOL/L
CREAT SERPL-MCNC: 1.1 MG/DL
EGFR: 69 ML/MIN/1.73M2
GLUCOSE SERPL-MCNC: 96 MG/DL
POTASSIUM SERPL-SCNC: 4.6 MMOL/L
PROT SERPL-MCNC: 6.7 G/DL
SODIUM SERPL-SCNC: 141 MMOL/L

## 2024-04-17 PROCEDURE — 96372 THER/PROPH/DIAG INJ SC/IM: CPT

## 2024-04-17 PROCEDURE — 96402 CHEMO HORMON ANTINEOPL SQ/IM: CPT

## 2024-04-17 PROCEDURE — 99213 OFFICE O/P EST LOW 20 MIN: CPT | Mod: 25

## 2024-04-17 PROCEDURE — 36415 COLL VENOUS BLD VENIPUNCTURE: CPT

## 2024-04-17 RX ORDER — PREDNISONE 5 MG/1
5 TABLET ORAL
Qty: 30 | Refills: 6 | Status: ACTIVE | COMMUNITY
Start: 2023-02-07 | End: 1900-01-01

## 2024-04-17 RX ORDER — LEUPROLIDE ACETATE 22.5 MG
22.5 KIT INTRAMUSCULAR
Qty: 1 | Refills: 0 | Status: COMPLETED | OUTPATIENT
Start: 2024-04-17

## 2024-04-17 RX ORDER — ABIRATERONE ACETATE 250 MG/1
250 TABLET, FILM COATED ORAL
Qty: 120 | Refills: 5 | Status: ACTIVE | COMMUNITY
Start: 2023-04-17 | End: 1900-01-01

## 2024-04-17 RX ORDER — DENOSUMAB 60 MG/ML
120 INJECTION SUBCUTANEOUS ONCE
Refills: 0 | Status: COMPLETED | OUTPATIENT
Start: 2024-04-17 | End: 2024-04-17

## 2024-04-17 RX ADMIN — DENOSUMAB 120 MILLIGRAM(S): 60 INJECTION SUBCUTANEOUS at 14:12

## 2024-04-17 RX ADMIN — LEUPROLIDE ACETATE 0 MG: KIT at 00:00

## 2024-04-17 NOTE — HISTORY OF PRESENT ILLNESS
[de-identified] : Mr. Ferrer is a 78 year old male with history of BPH, HTN, HLD who presents to clinic today for evaluation of metastatic MARK prostate cancer Grade Group 4 (Maira score 4+4=8) with bone mets, PSA 58 9/28/21 referred by Dr. Dubois. s/p ThuLEP on 12/9/21, path confirmed foci of prostatic adenocarcinoma (Maira's pattern 3+4 total score 7, grade group 2 ) He started Zytiga/prednisone on 12/16/21  PSMA scan on 1/26/22 showed multiple avid osseous metastases. Started Xgeva(120mg Q 3 months) on 5/24/22. Today here for f/u and Xgeva.   Colonoscopy in 2013, reportedly normal [facility]- Sister with breast cancer, Half brother with prostate cancer  ONC Hx Mr. Ferrer was being evaluated for 20 lb weight loss over 1 year with the inability to gain weight. He underwent CT scans which showed lower pole LEFT kidney 1.8 x 1.6 x 2.0 cm slightly exophytic solid enhancing mass, multiple bilateral simple cysts. markedly enlarged prostate gland with distended bladder and bladder diverticuli. PSA was 20.66 on 7/6/21. He had prostate biopsy with Dr. Melendrez  in 2011 which was negative. He was referred to Dr. Dubois on 9/28/21 for evaluation of kidney mass and elevated PSA. He notices weak stream, occasional staining to intimate stream, urinary frequency, and nocturia x 2.  He repeated PSA on 9/28/21 which was 58.40. He had MRI performed on 10/18/21 which showed 5.9 x 6.1 x 7.6 cm; volume 142 mL prostate with PIRADS 5 lesion measuring 1.6 cm Left posterolateral midgland peripheral zone. 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 Bone scans on 11/8/21 showed several osseous metastases. Dr. Tejada performed a targeted prostate biopsy with the UroNav MRI fusion on 11/11/20, pathology confirmed Adenocarcinoma of the prostate, Prognostic Grade Group 4 (Maira score 4+4=8). He is scheduled for ThuLEP with Dr. Brito on  12/9/21.   10/18/21 MRI at [place]  -5.9 x 6.1 x 7.6 cm; volume 142 mL prostate with PIRADS 5 lesion measuring 1.6 cm Left posterolateral midgland peripheral zone. 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   11/8/21 Bone scans   Findings: Focal radiotracer activity in the right clavicular head, probable right second rib, probable T6 vertebral body, and left sacrum, suspicious for osseous metastasis. Focal radiotracer activity in the L3 vertebral body, indeterminate.  Symmetric bilateral renal radiotracer activity. No abnormal focal soft tissue activity.  Distended urinary bladder with urinary radiotracer activity, obscuring pelvic bones, limiting evaluation.  Impression:  Several osseous metastases as described above.   11/11/21  Surgical Pathology Report   Final Diagnosis  1. Prostate, left anterior apex, biopsy  -Benign prostate tissue.  2. Prostate, left anterior base, biopsy  -Benign prostate tissue.  3. Prostate, right anterior apex, biopsy  -Benign prostate tissue.  4. Prostate, right anterior base, biopsy  -Benign prostate tissue.  5. Prostate, midline apex, biopsy  -Benign prostate tissue.  6. Prostate, midline base, biopsy  -Benign prostate tissue.  7. Prostate, left posterior apex, biopsy  -Adenocarcinoma of the prostate, Prognostic Grade Group 4 (Maira score 4+4=8) involving 70% (7.5 mm in length) of 1 of 1 core(s).  8. Prostate, left posterior base, biopsy  -Benign prostate tissue.  9. Prostate, right posterior apex, biopsy  -Adenocarcinoma of the prostate, Prognostic Grade Group 1 ([place] score 3+3=6) involving less than 5% (less than 0.5 mm in length) of 1 of 1 core(s).  10. Prostate, right posterior base, biopsy  -Benign prostate tissue.  11. Prostate, left lateral, biopsy  -Benign prostate tissue.  12. Prostate, right lateral, biopsy  -Benign prostate tissue.  13. Prostate, left mid PZPL, biopsy  -Adenocarcinoma of the prostate, Prognostic Grade Group 4 ([place] score 4+4=8) involving 60% (5.5 mm in length) of 1 of 4 core(s).  -Adenocarcinoma of the prostate, Prognostic Grade Group 1 (Maira score 3+3=6) involving 5% (0.5 mm in length) of 1 of 4 core(s).  -A total of 2 of 4 cores involved by carcinoma.  Note: Cribriform Maira pattern 4 is present.   MLH1:   Intact nuclear expression MSH2:   Intact nuclear expression MSH6:   Intact nuclear expression PMS2:   Intact nuclear expression  Interpretation:  No loss of nuclear expression of MMR proteins (MLH1, MSH2, MSH6, and PMS2).   These results show low probability of microsatellite instability-high (MSI-H). There is no evidence of DNA mismatch repair deficiency in the analyzed tissue, indicating there is a low probability for Barrera syndrome (a common cause hereditary non polyposis colorectal cancer or HNPCC).  12/9/21 s/p laser enucleation of the prostate with morcellator Surgical Pathology Report  Final Diagnosis 1. Prostate chips: - Benign prostatic hyperplasia and foci of prostatic adenocarcinoma ([place]'s pattern 3+4 total score 7) grade group 2 involving less than 5% of the submitted tissue - Maira's pattern 4 is less than 5% of the tumor 2. Bladder stone: - Calculus, gross diagnosis. - Specimen submitted for chemical analysis and will be reported separately  12/15/21 CT ABDOMEN AND PELVIS IC & CT CHEST IC Diffusely thick-walled urinary bladder which could be due to cystitis or bladder outlet obstruction. Perivesical fat infiltration suggesting cystitis. Enlarged prostate. Large TURP defect. No retroperitoneal or pelvic adenopathy. Indeterminate 1.9 cm cortical lesion in the lower pole left kidney. Could represent complicated cyst or tumor. Findings should be correlated with targeted renal sonography and/or MR. No intrathoracic adenopathy. Bone lesions T7, T11, L3 and L5 vertebral bodies and right second rib consistent with metastases. Staging for prostatic carcinoma at present appears to be T2, N0, M1b.  1/7/22 FO NGS CDK12 K418fs*14   Microsatellite status - MS-Stable Tumor Mutational [place] - 3 Muts/Mb Genomic Findings For a complete list of the genes assayed, please refer to the Appendix. CDK12 K418fs*14 FGFR3 amplification FGFR4 amplification - equivocalA? MDM2 amplification MDM4 amplification - equivocalA? PIK3CA amplification CIC loss IGF1R amplification IKBKE amplification - equivocalA? IRS2 amplification - equivocalA? MYCL1 amplification - equivocalA? [id number] amplification - equivocalA?  germline genetic testing negative  1/26/22 PSMA Multiple DCFPyL-avid osseous metastases.  6/30/22 CT CAP 1.Since 12/15/2021, decreased prostate size. 2. No change in the extent of osseous metastatic disease, though several of the bone metastases have become more sclerotic, which could reflect posttreatment change. 3. No change in size of a 1.9 cm left renal mass which is again more dense than a simple cyst. It could represent a hemorrhagic cyst or solid mass. Either continued follow-up recommended or definitive characterization with dedicated imaging.  2/1/23 CT CAP 1. Since June 13, 2022, unchanged osseous metastases. 2. No new suspicious finding. [de-identified] : Patient presents to clinic today for follow up with Samantha. He has been compliant with abiraterone/prednisone, [facility] due to the shortage of [facility].  Overall he is doing well w/o new symptoms since last visit.  Denies any new sites of pain.  eating good, weight stable but difficult for him to lose weight.  denies any urinary symptoms or dental issues. + mild fatigue, manageable.

## 2024-04-17 NOTE — ASSESSMENT
[FreeTextEntry1] : Mr. 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 Dubois. s/p ThuLEP on 12/9/21, path confirmed foci of prostatic adenocarcinoma (Maira's pattern 3+4 total score 7, grade group 2 ) He started Zytiga/prednisone on 12/16/21 Recieved lupron on 12/29/21 Here for f/u after PSMA scan on 1/26/22 showing multiple avid osseous metastases. Started Xgeva (120mg Q 3 months) on 5/24/22. 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 Started zytiga/pred on 12/16/21. -- PSA < 0.01 since 5/2023,  initially was 220 on 12/2021,  -- he is tolerating abiraterone/prednisone + ADT with mild side effects (fatigue, hot flashes). --indefinite treatment planned in the setting of metastatic disease, as long as disease is controlled, and treatment is tolerable. --switched to [facility] due to drug cost. However, [facility] was on backorder. Drug cost has been the biggest issue for him. Copay for abiraterone was unacceptably high when put through insurance; other options are to obtain abiraterone from an online pharmacy out of pocket (eg [facility]) vs switch to Xtandi if [facility] is not available and abiraterone is cost prohibitive. He started abiraterone 1000mg/[facility]. No issues so far.  Rx renewed today.  --follows with urology for depot leuprolide. received today, due 7/17/24 --repeated CT CAP on 2/1/23 with stable disease, Will repeat CT scans if rising PSA or any worsening symptoms. --he is clinically doing well, no concerns at this visit, continue on abiraterone/prednisone/ADT --CBC/CMP reviewed, looks good, PSA pending   # bone metastases --receiving Xgeva 120mg every 3 months - will give today. --continue daily Vitamin D --vit D 44.4 on 1/17/24 --follow up w/ dentist for routine care, no jaw pain at this visit   # back pain -resolved  # weight gain --encourage well balanced diet, light exercise, PO hydration  RTC with Xgeva on 7/17/24 labs- PSA, T, CBC, CMP

## 2024-04-18 ENCOUNTER — NON-APPOINTMENT (OUTPATIENT)
Age: 79
End: 2024-04-18

## 2024-04-18 LAB — PSA SERPL-MCNC: <0.01 NG/ML

## 2024-04-19 LAB
TESTOST FREE SERPL-MCNC: 0 PG/ML
TESTOST SERPL-MCNC: <2.5 NG/DL

## 2024-05-23 RX ORDER — LEUPROLIDE ACETATE 22.5 MG
22.5 KIT INTRAMUSCULAR
Qty: 1 | Refills: 0 | Status: COMPLETED | OUTPATIENT
Start: 2024-05-23

## 2024-05-23 RX ADMIN — LEUPROLIDE ACETATE 0 MG: KIT at 00:00

## 2024-06-12 ENCOUNTER — NON-APPOINTMENT (OUTPATIENT)
Age: 79
End: 2024-06-12

## 2024-06-12 ENCOUNTER — APPOINTMENT (OUTPATIENT)
Dept: HEART AND VASCULAR | Facility: CLINIC | Age: 79
End: 2024-06-12
Payer: MEDICARE

## 2024-06-12 VITALS
HEART RATE: 80 BPM | SYSTOLIC BLOOD PRESSURE: 135 MMHG | DIASTOLIC BLOOD PRESSURE: 80 MMHG | OXYGEN SATURATION: 99 % | TEMPERATURE: 98 F | HEIGHT: 66 IN | WEIGHT: 170 LBS | BODY MASS INDEX: 27.32 KG/M2

## 2024-06-12 DIAGNOSIS — E78.00 PURE HYPERCHOLESTEROLEMIA, UNSPECIFIED: ICD-10-CM

## 2024-06-12 DIAGNOSIS — I10 ESSENTIAL (PRIMARY) HYPERTENSION: ICD-10-CM

## 2024-06-12 DIAGNOSIS — R42 DIZZINESS AND GIDDINESS: ICD-10-CM

## 2024-06-12 DIAGNOSIS — K21.9 GASTRO-ESOPHAGEAL REFLUX DISEASE W/OUT ESOPHAGITIS: ICD-10-CM

## 2024-06-12 PROCEDURE — G2211 COMPLEX E/M VISIT ADD ON: CPT

## 2024-06-12 PROCEDURE — 99214 OFFICE O/P EST MOD 30 MIN: CPT

## 2024-06-12 PROCEDURE — 93000 ELECTROCARDIOGRAM COMPLETE: CPT

## 2024-06-12 RX ORDER — MECLIZINE HYDROCHLORIDE 25 MG/1
25 TABLET ORAL 3 TIMES DAILY
Qty: 20 | Refills: 0 | Status: ACTIVE | COMMUNITY
Start: 2024-06-12 | End: 1900-01-01

## 2024-06-12 NOTE — DISCUSSION/SUMMARY
[FreeTextEntry1] : stable exam HTN stable on meds Lipids stable on statin Gerd stable vertigo- trial of meclizine [EKG obtained to assist in diagnosis and management of assessed problem(s)] : EKG obtained to assist in diagnosis and management of assessed problem(s)

## 2024-07-17 ENCOUNTER — APPOINTMENT (OUTPATIENT)
Dept: INFUSION THERAPY | Facility: CLINIC | Age: 79
End: 2024-07-17

## 2024-07-17 ENCOUNTER — OUTPATIENT (OUTPATIENT)
Dept: OUTPATIENT SERVICES | Facility: HOSPITAL | Age: 79
LOS: 1 days | End: 2024-07-17
Payer: MEDICARE

## 2024-07-17 ENCOUNTER — APPOINTMENT (OUTPATIENT)
Dept: HEMATOLOGY ONCOLOGY | Facility: CLINIC | Age: 79
End: 2024-07-17
Payer: MEDICARE

## 2024-07-17 ENCOUNTER — APPOINTMENT (OUTPATIENT)
Dept: UROLOGY | Facility: CLINIC | Age: 79
End: 2024-07-17
Payer: MEDICARE

## 2024-07-17 VITALS
HEIGHT: 66 IN | HEART RATE: 73 BPM | OXYGEN SATURATION: 97 % | BODY MASS INDEX: 27.8 KG/M2 | DIASTOLIC BLOOD PRESSURE: 82 MMHG | WEIGHT: 173 LBS | SYSTOLIC BLOOD PRESSURE: 134 MMHG | RESPIRATION RATE: 18 BRPM | TEMPERATURE: 98.9 F

## 2024-07-17 VITALS
DIASTOLIC BLOOD PRESSURE: 78 MMHG | BODY MASS INDEX: 27.32 KG/M2 | HEART RATE: 73 BPM | TEMPERATURE: 98.2 F | SYSTOLIC BLOOD PRESSURE: 148 MMHG | WEIGHT: 170 LBS | HEIGHT: 66 IN

## 2024-07-17 VITALS
OXYGEN SATURATION: 99 % | HEART RATE: 73 BPM | SYSTOLIC BLOOD PRESSURE: 134 MMHG | WEIGHT: 173.06 LBS | TEMPERATURE: 99 F | RESPIRATION RATE: 18 BRPM | HEIGHT: 66 IN | DIASTOLIC BLOOD PRESSURE: 82 MMHG

## 2024-07-17 DIAGNOSIS — C61 MALIGNANT NEOPLASM OF PROSTATE: ICD-10-CM

## 2024-07-17 DIAGNOSIS — C79.51 MALIGNANT NEOPLASM OF PROSTATE: ICD-10-CM

## 2024-07-17 LAB
ALBUMIN SERPL ELPH-MCNC: 3.5 G/DL
ALP BLD-CCNC: 41 U/L
ALT SERPL-CCNC: 16 U/L
ANION GAP SERPL CALC-SCNC: 5 MMOL/L
AST SERPL-CCNC: 22 U/L
BILIRUB SERPL-MCNC: 0.8 MG/DL
BUN SERPL-MCNC: 13 MG/DL
CALCIUM SERPL-MCNC: 10.1 MG/DL
CHLORIDE SERPL-SCNC: 110 MMOL/L
CO2 SERPL-SCNC: 28 MMOL/L
CREAT SERPL-MCNC: 1.3 MG/DL
EGFR: 56 ML/MIN/1.73M2
GLUCOSE SERPL-MCNC: 105 MG/DL
HCT VFR BLD CALC: 39.2 %
HGB BLD-MCNC: 12.7 G/DL
LYMPHOCYTES # BLD AUTO: 3.8 K/UL
LYMPHOCYTES NFR BLD AUTO: 43 %
MAN DIFF?: NO
MCHC RBC-ENTMCNC: 30 PG
MCHC RBC-ENTMCNC: 32.4 GM/DL
MCV RBC AUTO: 92.5 FL
NEUTROPHILS # BLD AUTO: 4.3 K/UL
NEUTROPHILS NFR BLD AUTO: 49.1 %
PLATELET # BLD AUTO: 218 K/UL
POTASSIUM SERPL-SCNC: 3.8 MMOL/L
PROT SERPL-MCNC: 6.8 G/DL
RBC # BLD: 4.24 M/UL
RBC # FLD: 14.4 %
SODIUM SERPL-SCNC: 143 MMOL/L
WBC # FLD AUTO: 8.8 K/UL

## 2024-07-17 PROCEDURE — 36415 COLL VENOUS BLD VENIPUNCTURE: CPT

## 2024-07-17 PROCEDURE — 99213 OFFICE O/P EST LOW 20 MIN: CPT | Mod: 25

## 2024-07-17 PROCEDURE — 96372 THER/PROPH/DIAG INJ SC/IM: CPT

## 2024-07-17 PROCEDURE — 96402 CHEMO HORMON ANTINEOPL SQ/IM: CPT

## 2024-07-17 RX ORDER — LEUPROLIDE ACETATE 22.5 MG
22.5 KIT INTRAMUSCULAR
Qty: 1 | Refills: 0 | Status: COMPLETED | OUTPATIENT
Start: 2024-07-17

## 2024-07-17 RX ORDER — DENOSUMAB 120 MG/1.7ML
120 INJECTION SUBCUTANEOUS ONCE
Refills: 0 | Status: COMPLETED | OUTPATIENT
Start: 2024-07-17 | End: 2024-07-17

## 2024-07-17 RX ADMIN — Medication 0 MG: at 00:00

## 2024-07-17 RX ADMIN — DENOSUMAB 120 MILLIGRAM(S): 120 INJECTION SUBCUTANEOUS at 14:01

## 2024-07-19 LAB — PSA SERPL-MCNC: <0.01 NG/ML

## 2024-07-20 LAB
TESTOST FREE SERPL-MCNC: 0 PG/ML
TESTOST SERPL-MCNC: <2.5 NG/DL

## 2024-07-23 ENCOUNTER — NON-APPOINTMENT (OUTPATIENT)
Age: 79
End: 2024-07-23

## 2024-09-11 ENCOUNTER — APPOINTMENT (OUTPATIENT)
Dept: HEART AND VASCULAR | Facility: CLINIC | Age: 79
End: 2024-09-11
Payer: MEDICARE

## 2024-09-11 VITALS
SYSTOLIC BLOOD PRESSURE: 148 MMHG | BODY MASS INDEX: 27.96 KG/M2 | TEMPERATURE: 98.2 F | HEART RATE: 70 BPM | DIASTOLIC BLOOD PRESSURE: 91 MMHG | WEIGHT: 173.99 LBS | HEIGHT: 66 IN | OXYGEN SATURATION: 97 %

## 2024-09-11 DIAGNOSIS — E78.00 PURE HYPERCHOLESTEROLEMIA, UNSPECIFIED: ICD-10-CM

## 2024-09-11 DIAGNOSIS — R42 DIZZINESS AND GIDDINESS: ICD-10-CM

## 2024-09-11 DIAGNOSIS — N28.89 OTHER SPECIFIED DISORDERS OF KIDNEY AND URETER: ICD-10-CM

## 2024-09-11 DIAGNOSIS — I10 ESSENTIAL (PRIMARY) HYPERTENSION: ICD-10-CM

## 2024-09-11 DIAGNOSIS — K21.9 GASTRO-ESOPHAGEAL REFLUX DISEASE W/OUT ESOPHAGITIS: ICD-10-CM

## 2024-09-11 PROCEDURE — 99214 OFFICE O/P EST MOD 30 MIN: CPT

## 2024-09-11 PROCEDURE — G2211 COMPLEX E/M VISIT ADD ON: CPT

## 2024-09-11 NOTE — DISCUSSION/SUMMARY
[FreeTextEntry1] : stable exam HTN stable on meds/lifestyle Lipids stable on statin Gerd stable, prn famotadine vertigo resolved

## 2024-10-16 ENCOUNTER — OUTPATIENT (OUTPATIENT)
Dept: OUTPATIENT SERVICES | Facility: HOSPITAL | Age: 79
LOS: 1 days | End: 2024-10-16
Payer: MEDICARE

## 2024-10-16 ENCOUNTER — APPOINTMENT (OUTPATIENT)
Dept: HEMATOLOGY ONCOLOGY | Facility: CLINIC | Age: 79
End: 2024-10-16
Payer: MEDICARE

## 2024-10-16 ENCOUNTER — APPOINTMENT (OUTPATIENT)
Dept: UROLOGY | Facility: CLINIC | Age: 79
End: 2024-10-16
Payer: MEDICARE

## 2024-10-16 ENCOUNTER — APPOINTMENT (OUTPATIENT)
Dept: INFUSION THERAPY | Facility: CLINIC | Age: 79
End: 2024-10-16

## 2024-10-16 VITALS
TEMPERATURE: 99 F | DIASTOLIC BLOOD PRESSURE: 65 MMHG | WEIGHT: 174 LBS | RESPIRATION RATE: 18 BRPM | SYSTOLIC BLOOD PRESSURE: 113 MMHG | OXYGEN SATURATION: 97 % | HEART RATE: 86 BPM | TEMPERATURE: 99.2 F | RESPIRATION RATE: 18 BRPM | DIASTOLIC BLOOD PRESSURE: 65 MMHG | BODY MASS INDEX: 27.97 KG/M2 | HEIGHT: 66 IN | OXYGEN SATURATION: 97 % | HEIGHT: 66 IN | SYSTOLIC BLOOD PRESSURE: 113 MMHG | HEART RATE: 86 BPM | WEIGHT: 173.94 LBS

## 2024-10-16 VITALS
BODY MASS INDEX: 27.8 KG/M2 | SYSTOLIC BLOOD PRESSURE: 130 MMHG | HEART RATE: 84 BPM | DIASTOLIC BLOOD PRESSURE: 66 MMHG | WEIGHT: 173 LBS | HEIGHT: 66 IN | TEMPERATURE: 97.8 F

## 2024-10-16 DIAGNOSIS — C79.51 MALIGNANT NEOPLASM OF PROSTATE: ICD-10-CM

## 2024-10-16 DIAGNOSIS — E78.00 PURE HYPERCHOLESTEROLEMIA, UNSPECIFIED: ICD-10-CM

## 2024-10-16 DIAGNOSIS — C61 MALIGNANT NEOPLASM OF PROSTATE: ICD-10-CM

## 2024-10-16 DIAGNOSIS — Z79.818 LONG TERM (CURRENT) USE OF OTHER AGENTS AFFECTING ESTROGEN RECEPTORS AND ESTROGEN LEVELS: ICD-10-CM

## 2024-10-16 LAB
HCT VFR BLD CALC: 39.1 %
HGB BLD-MCNC: 12.8 G/DL
LYMPHOCYTES # BLD AUTO: 3.4 K/UL
LYMPHOCYTES NFR BLD AUTO: 28.6 %
MAN DIFF?: NO
MCHC RBC-ENTMCNC: 30.1 PG
MCHC RBC-ENTMCNC: 32.7 GM/DL
MCV RBC AUTO: 92 FL
NEUTROPHILS # BLD AUTO: 7.4 K/UL
NEUTROPHILS NFR BLD AUTO: 62.1 %
PLATELET # BLD AUTO: 244 K/UL
RBC # BLD: 4.25 M/UL
RBC # FLD: 14.3 %
WBC # FLD AUTO: 11.9 K/UL

## 2024-10-16 PROCEDURE — 36415 COLL VENOUS BLD VENIPUNCTURE: CPT

## 2024-10-16 PROCEDURE — 96402 CHEMO HORMON ANTINEOPL SQ/IM: CPT

## 2024-10-16 PROCEDURE — 99214 OFFICE O/P EST MOD 30 MIN: CPT | Mod: 25

## 2024-10-16 RX ORDER — DENOSUMAB 120 MG/1.7ML
120 INJECTION SUBCUTANEOUS ONCE
Refills: 0 | Status: COMPLETED | OUTPATIENT
Start: 2024-10-16 | End: 2024-10-16

## 2024-10-16 RX ORDER — LEUPROLIDE ACETATE 22.5 MG
22.5 KIT INTRAMUSCULAR
Qty: 1 | Refills: 0 | Status: COMPLETED | OUTPATIENT
Start: 2024-10-16

## 2024-10-16 RX ADMIN — DENOSUMAB 120 MILLIGRAM(S): 120 INJECTION SUBCUTANEOUS at 13:48

## 2024-10-16 RX ADMIN — LEUPROLIDE ACETATE 0 MG: KIT at 00:00

## 2024-10-18 LAB
25(OH)D3 SERPL-MCNC: 50.2 NG/ML
ALBUMIN SERPL ELPH-MCNC: 3.5 G/DL
ALP BLD-CCNC: 33 U/L
ALT SERPL-CCNC: 17 U/L
ANION GAP SERPL CALC-SCNC: -1 MMOL/L
AST SERPL-CCNC: 24 U/L
BILIRUB SERPL-MCNC: 0.9 MG/DL
BUN SERPL-MCNC: 16 MG/DL
CALCIUM SERPL-MCNC: 10.6 MG/DL
CHLORIDE SERPL-SCNC: 112 MMOL/L
CHOLEST SERPL-MCNC: 162 MG/DL
CHOLEST/HDLC SERPL: 2.8 RATIO
CO2 SERPL-SCNC: 29 MMOL/L
CREAT SERPL-MCNC: 1.2 MG/DL
EGFR: 62 ML/MIN/1.73M2
GLUCOSE SERPL-MCNC: 110 MG/DL
HDLC SERPL-MCNC: 58 MG/DL
LDLC SERPL CALC-MCNC: 89 MG/DL
NONHDLC SERPL-MCNC: 104 MG/DL
POTASSIUM SERPL-SCNC: 4.6 MMOL/L
PROT SERPL-MCNC: 6.8 G/DL
PSA SERPL-MCNC: <0.01 NG/ML
SODIUM SERPL-SCNC: 140 MMOL/L
TESTOST FREE SERPL-MCNC: 0.1 PG/ML
TESTOST SERPL-MCNC: <2.5 NG/DL
TRIGL SERPL-MCNC: 80 MG/DL

## 2024-11-20 PROCEDURE — 96372 THER/PROPH/DIAG INJ SC/IM: CPT

## 2024-11-26 ENCOUNTER — NON-APPOINTMENT (OUTPATIENT)
Age: 79
End: 2024-11-26

## 2024-12-05 ENCOUNTER — APPOINTMENT (OUTPATIENT)
Dept: OTOLARYNGOLOGY | Facility: CLINIC | Age: 79
End: 2024-12-05
Payer: MEDICARE

## 2024-12-05 ENCOUNTER — NON-APPOINTMENT (OUTPATIENT)
Age: 79
End: 2024-12-05

## 2024-12-05 VITALS
DIASTOLIC BLOOD PRESSURE: 80 MMHG | TEMPERATURE: 98.7 F | BODY MASS INDEX: 28.08 KG/M2 | WEIGHT: 174 LBS | SYSTOLIC BLOOD PRESSURE: 150 MMHG | OXYGEN SATURATION: 98 % | HEART RATE: 74 BPM

## 2024-12-05 DIAGNOSIS — H91.90 UNSPECIFIED HEARING LOSS, UNSPECIFIED EAR: ICD-10-CM

## 2024-12-05 PROCEDURE — 99203 OFFICE O/P NEW LOW 30 MIN: CPT

## 2025-01-15 ENCOUNTER — NON-APPOINTMENT (OUTPATIENT)
Age: 80
End: 2025-01-15

## 2025-01-15 ENCOUNTER — APPOINTMENT (OUTPATIENT)
Dept: UROLOGY | Facility: CLINIC | Age: 80
End: 2025-01-15
Payer: MEDICARE

## 2025-01-15 ENCOUNTER — APPOINTMENT (OUTPATIENT)
Dept: HEMATOLOGY ONCOLOGY | Facility: CLINIC | Age: 80
End: 2025-01-15
Payer: MEDICARE

## 2025-01-15 ENCOUNTER — OUTPATIENT (OUTPATIENT)
Dept: OUTPATIENT SERVICES | Facility: HOSPITAL | Age: 80
LOS: 1 days | End: 2025-01-15
Payer: MEDICARE

## 2025-01-15 ENCOUNTER — APPOINTMENT (OUTPATIENT)
Dept: INFUSION THERAPY | Facility: CLINIC | Age: 80
End: 2025-01-15

## 2025-01-15 ENCOUNTER — APPOINTMENT (OUTPATIENT)
Dept: HEART AND VASCULAR | Facility: CLINIC | Age: 80
End: 2025-01-15
Payer: MEDICARE

## 2025-01-15 VITALS
RESPIRATION RATE: 18 BRPM | TEMPERATURE: 98 F | HEART RATE: 67 BPM | WEIGHT: 173.94 LBS | OXYGEN SATURATION: 98 % | SYSTOLIC BLOOD PRESSURE: 146 MMHG | HEIGHT: 66 IN | DIASTOLIC BLOOD PRESSURE: 79 MMHG

## 2025-01-15 VITALS
WEIGHT: 175 LBS | DIASTOLIC BLOOD PRESSURE: 89 MMHG | OXYGEN SATURATION: 99 % | TEMPERATURE: 97.7 F | HEIGHT: 66 IN | HEART RATE: 63 BPM | BODY MASS INDEX: 28.12 KG/M2 | SYSTOLIC BLOOD PRESSURE: 165 MMHG

## 2025-01-15 VITALS
BODY MASS INDEX: 27.97 KG/M2 | DIASTOLIC BLOOD PRESSURE: 83 MMHG | HEART RATE: 85 BPM | OXYGEN SATURATION: 94 % | HEIGHT: 66 IN | SYSTOLIC BLOOD PRESSURE: 148 MMHG | WEIGHT: 174 LBS | RESPIRATION RATE: 18 BRPM

## 2025-01-15 VITALS
HEART RATE: 88 BPM | WEIGHT: 174 LBS | DIASTOLIC BLOOD PRESSURE: 74 MMHG | HEIGHT: 66 IN | BODY MASS INDEX: 27.97 KG/M2 | SYSTOLIC BLOOD PRESSURE: 148 MMHG | TEMPERATURE: 98.4 F

## 2025-01-15 VITALS — DIASTOLIC BLOOD PRESSURE: 70 MMHG | SYSTOLIC BLOOD PRESSURE: 134 MMHG

## 2025-01-15 DIAGNOSIS — M25.511 PAIN IN RIGHT SHOULDER: ICD-10-CM

## 2025-01-15 DIAGNOSIS — C61 MALIGNANT NEOPLASM OF PROSTATE: ICD-10-CM

## 2025-01-15 DIAGNOSIS — I10 ESSENTIAL (PRIMARY) HYPERTENSION: ICD-10-CM

## 2025-01-15 DIAGNOSIS — C79.51 MALIGNANT NEOPLASM OF PROSTATE: ICD-10-CM

## 2025-01-15 DIAGNOSIS — E78.00 PURE HYPERCHOLESTEROLEMIA, UNSPECIFIED: ICD-10-CM

## 2025-01-15 LAB
ALBUMIN SERPL ELPH-MCNC: 3.5 G/DL
ALP BLD-CCNC: 51 U/L
ALT SERPL-CCNC: 11 U/L
ANION GAP SERPL CALC-SCNC: -3 MMOL/L
AST SERPL-CCNC: 19 U/L
BILIRUB SERPL-MCNC: 0.9 MG/DL
BUN SERPL-MCNC: 16 MG/DL
CALCIUM SERPL-MCNC: 10.2 MG/DL
CHLORIDE SERPL-SCNC: 114 MMOL/L
CO2 SERPL-SCNC: 28 MMOL/L
CREAT SERPL-MCNC: 1.2 MG/DL
EGFR: 62 ML/MIN/1.73M2
GLUCOSE SERPL-MCNC: 105 MG/DL
HCT VFR BLD CALC: 40.8 %
HGB BLD-MCNC: 13.4 G/DL
LYMPHOCYTES # BLD AUTO: 2.9 K/UL
LYMPHOCYTES NFR BLD AUTO: 38 %
MAN DIFF?: NO
MCHC RBC-ENTMCNC: 29.5 PG
MCHC RBC-ENTMCNC: 32.8 G/DL
MCV RBC AUTO: 89.9 FL
NEUTROPHILS # BLD AUTO: 4.1 K/UL
NEUTROPHILS NFR BLD AUTO: 53.8 %
PLATELET # BLD AUTO: 224 K/UL
POTASSIUM SERPL-SCNC: 4.7 MMOL/L
PROT SERPL-MCNC: 7 G/DL
RBC # BLD: 4.54 M/UL
RBC # FLD: 14 %
SODIUM SERPL-SCNC: 139 MMOL/L
WBC # FLD AUTO: 7.6 K/UL

## 2025-01-15 PROCEDURE — 96402 CHEMO HORMON ANTINEOPL SQ/IM: CPT

## 2025-01-15 PROCEDURE — 99214 OFFICE O/P EST MOD 30 MIN: CPT | Mod: 25

## 2025-01-15 PROCEDURE — 99214 OFFICE O/P EST MOD 30 MIN: CPT

## 2025-01-15 PROCEDURE — 36415 COLL VENOUS BLD VENIPUNCTURE: CPT

## 2025-01-15 PROCEDURE — 93000 ELECTROCARDIOGRAM COMPLETE: CPT

## 2025-01-15 PROCEDURE — G2211 COMPLEX E/M VISIT ADD ON: CPT

## 2025-01-15 PROCEDURE — 96372 THER/PROPH/DIAG INJ SC/IM: CPT

## 2025-01-15 RX ORDER — DENOSUMAB 60 MG/ML
120 INJECTION SUBCUTANEOUS ONCE
Refills: 0 | Status: COMPLETED | OUTPATIENT
Start: 2025-01-15 | End: 2025-01-15

## 2025-01-15 RX ORDER — LEUPROLIDE ACETATE 22.5 MG
22.5 KIT INTRAMUSCULAR
Qty: 1 | Refills: 0 | Status: COMPLETED | OUTPATIENT
Start: 2025-01-15

## 2025-01-15 RX ADMIN — Medication 0 MG: at 00:00

## 2025-01-15 RX ADMIN — DENOSUMAB 120 MILLIGRAM(S): 60 INJECTION SUBCUTANEOUS at 14:23

## 2025-01-16 ENCOUNTER — APPOINTMENT (OUTPATIENT)
Dept: OTOLARYNGOLOGY | Facility: CLINIC | Age: 80
End: 2025-01-16

## 2025-01-16 LAB — PSA SERPL-MCNC: <0.01 NG/ML

## 2025-01-17 ENCOUNTER — NON-APPOINTMENT (OUTPATIENT)
Age: 80
End: 2025-01-17

## 2025-01-19 LAB
TESTOST FREE SERPL-MCNC: 0 PG/ML
TESTOST SERPL-MCNC: <2.5 NG/DL

## 2025-04-09 ENCOUNTER — APPOINTMENT (OUTPATIENT)
Dept: HEART AND VASCULAR | Facility: CLINIC | Age: 80
End: 2025-04-09
Payer: MEDICARE

## 2025-04-09 VITALS
SYSTOLIC BLOOD PRESSURE: 138 MMHG | HEART RATE: 82 BPM | TEMPERATURE: 98.4 F | DIASTOLIC BLOOD PRESSURE: 72 MMHG | OXYGEN SATURATION: 98 % | WEIGHT: 173 LBS | BODY MASS INDEX: 27.8 KG/M2 | HEIGHT: 66 IN

## 2025-04-09 DIAGNOSIS — I10 ESSENTIAL (PRIMARY) HYPERTENSION: ICD-10-CM

## 2025-04-09 DIAGNOSIS — E78.00 PURE HYPERCHOLESTEROLEMIA, UNSPECIFIED: ICD-10-CM

## 2025-04-09 PROCEDURE — 99213 OFFICE O/P EST LOW 20 MIN: CPT

## 2025-04-09 PROCEDURE — G2211 COMPLEX E/M VISIT ADD ON: CPT

## 2025-04-15 PROBLEM — Z79.818 ENCOUNTER FOR MONITORING ANDROGEN DEPRIVATION THERAPY: Status: ACTIVE | Noted: 2022-02-09

## 2025-04-16 ENCOUNTER — APPOINTMENT (OUTPATIENT)
Dept: HEMATOLOGY ONCOLOGY | Facility: CLINIC | Age: 80
End: 2025-04-16
Payer: MEDICARE

## 2025-04-16 ENCOUNTER — APPOINTMENT (OUTPATIENT)
Dept: INFUSION THERAPY | Facility: CLINIC | Age: 80
End: 2025-04-16

## 2025-04-16 ENCOUNTER — APPOINTMENT (OUTPATIENT)
Dept: UROLOGY | Facility: CLINIC | Age: 80
End: 2025-04-16

## 2025-04-16 ENCOUNTER — OUTPATIENT (OUTPATIENT)
Dept: OUTPATIENT SERVICES | Facility: HOSPITAL | Age: 80
LOS: 1 days | End: 2025-04-16
Payer: MEDICARE

## 2025-04-16 ENCOUNTER — MED ADMIN CHARGE (OUTPATIENT)
Age: 80
End: 2025-04-16

## 2025-04-16 VITALS
SYSTOLIC BLOOD PRESSURE: 119 MMHG | DIASTOLIC BLOOD PRESSURE: 74 MMHG | HEART RATE: 82 BPM | HEIGHT: 66 IN | TEMPERATURE: 98.1 F | BODY MASS INDEX: 27.8 KG/M2 | WEIGHT: 173 LBS

## 2025-04-16 VITALS
DIASTOLIC BLOOD PRESSURE: 85 MMHG | HEIGHT: 66 IN | SYSTOLIC BLOOD PRESSURE: 133 MMHG | WEIGHT: 173.06 LBS | HEART RATE: 65 BPM | RESPIRATION RATE: 18 BRPM | TEMPERATURE: 99 F

## 2025-04-16 VITALS
HEIGHT: 65 IN | SYSTOLIC BLOOD PRESSURE: 126 MMHG | OXYGEN SATURATION: 96 % | DIASTOLIC BLOOD PRESSURE: 76 MMHG | BODY MASS INDEX: 28.89 KG/M2 | TEMPERATURE: 98.7 F | WEIGHT: 173.38 LBS | HEART RATE: 67 BPM | RESPIRATION RATE: 18 BRPM

## 2025-04-16 DIAGNOSIS — Z79.890 HORMONE REPLACEMENT THERAPY: ICD-10-CM

## 2025-04-16 DIAGNOSIS — Z13.820 ENCOUNTER FOR SCREENING FOR OSTEOPOROSIS: ICD-10-CM

## 2025-04-16 DIAGNOSIS — C79.51 MALIGNANT NEOPLASM OF PROSTATE: ICD-10-CM

## 2025-04-16 DIAGNOSIS — C61 MALIGNANT NEOPLASM OF PROSTATE: ICD-10-CM

## 2025-04-16 DIAGNOSIS — Z79.818 LONG TERM (CURRENT) USE OF OTHER AGENTS AFFECTING ESTROGEN RECEPTORS AND ESTROGEN LEVELS: ICD-10-CM

## 2025-04-16 DIAGNOSIS — N28.89 OTHER SPECIFIED DISORDERS OF KIDNEY AND URETER: ICD-10-CM

## 2025-04-16 PROCEDURE — 99214 OFFICE O/P EST MOD 30 MIN: CPT | Mod: 25

## 2025-04-16 PROCEDURE — 36415 COLL VENOUS BLD VENIPUNCTURE: CPT

## 2025-04-16 PROCEDURE — 96372 THER/PROPH/DIAG INJ SC/IM: CPT

## 2025-04-16 PROCEDURE — 96402 CHEMO HORMON ANTINEOPL SQ/IM: CPT

## 2025-04-16 RX ORDER — DENOSUMAB 60 MG/ML
120 INJECTION SUBCUTANEOUS ONCE
Refills: 0 | Status: COMPLETED | OUTPATIENT
Start: 2025-04-16 | End: 2025-04-16

## 2025-04-16 RX ORDER — LEUPROLIDE ACETATE 22.5 MG
22.5 KIT INTRAMUSCULAR
Qty: 1 | Refills: 0 | Status: COMPLETED | OUTPATIENT
Start: 2025-04-16

## 2025-04-16 RX ADMIN — DENOSUMAB 120 MILLIGRAM(S): 60 INJECTION SUBCUTANEOUS at 14:03

## 2025-04-16 RX ADMIN — LEUPROLIDE ACETATE 0 MG: KIT at 00:00

## 2025-04-16 NOTE — DISCHARGE INSTRUCTIONS: GENERAL THERAPY - PATIENT SIGNATURE -
Bedside shift change report given to Rayna Uriostegui (oncoming nurse) by Oj Mckeon (offgoing nurse). Report included the following information SBAR, Intake/Output, MAR, Recent Results and Cardiac Rhythm NSR. Statement Selected

## 2025-04-16 NOTE — DISCHARGE INSTRUCTIONS: GENERAL THERAPY - DC SYMPTOM 4
Episodes of diarrhea (more than 3 times a day), or if you are unable to tolerate food or fluids Glycopyrrolate Pregnancy And Lactation Text: This medication is Pregnancy Category B and is considered safe during pregnancy. It is unknown if it is excreted breast milk.

## 2025-04-17 ENCOUNTER — NON-APPOINTMENT (OUTPATIENT)
Age: 80
End: 2025-04-17

## 2025-04-17 LAB
ALBUMIN SERPL ELPH-MCNC: 3.6 G/DL
ALP BLD-CCNC: 42 U/L
ALT SERPL-CCNC: 14 U/L
ANION GAP SERPL CALC-SCNC: 1 MMOL/L
AST SERPL-CCNC: 22 U/L
BILIRUB SERPL-MCNC: 0.9 MG/DL
BUN SERPL-MCNC: 15 MG/DL
CALCIUM SERPL-MCNC: 9.9 MG/DL
CHLORIDE SERPL-SCNC: 114 MMOL/L
CO2 SERPL-SCNC: 25 MMOL/L
CREAT SERPL-MCNC: 1.1 MG/DL
EGFRCR SERPLBLD CKD-EPI 2021: 68 ML/MIN/1.73M2
GLUCOSE SERPL-MCNC: 110 MG/DL
HCT VFR BLD CALC: 39 %
HGB BLD-MCNC: 12.9 G/DL
LYMPHOCYTES # BLD AUTO: 2.6 K/UL
LYMPHOCYTES NFR BLD AUTO: 35.7 %
MAN DIFF?: NO
MCHC RBC-ENTMCNC: 29.7 PG
MCHC RBC-ENTMCNC: 33.1 G/DL
MCV RBC AUTO: 89.9 FL
NEUTROPHILS # BLD AUTO: 4 K/UL
NEUTROPHILS NFR BLD AUTO: 56.4 %
PLATELET # BLD AUTO: 222 K/UL
POTASSIUM SERPL-SCNC: 4.7 MMOL/L
PROT SERPL-MCNC: 7.2 G/DL
PSA SERPL-MCNC: <0.01 NG/ML
RBC # BLD: 4.34 M/UL
RBC # FLD: 14.1 %
SODIUM SERPL-SCNC: 140 MMOL/L
TESTOST FREE SERPL-MCNC: 0.1 PG/ML
TESTOST SERPL-MCNC: <2.5 NG/DL
WBC # FLD AUTO: 7.2 K/UL

## 2025-06-30 ENCOUNTER — OUTPATIENT (OUTPATIENT)
Dept: OUTPATIENT SERVICES | Facility: HOSPITAL | Age: 80
LOS: 1 days | End: 2025-06-30

## 2025-06-30 ENCOUNTER — APPOINTMENT (OUTPATIENT)
Dept: RADIOLOGY | Facility: CLINIC | Age: 80
End: 2025-06-30

## 2025-06-30 PROCEDURE — 77085 DXA BONE DENSITY AXL VRT FX: CPT | Mod: 26

## 2025-07-07 ENCOUNTER — NON-APPOINTMENT (OUTPATIENT)
Age: 80
End: 2025-07-07

## 2025-07-08 ENCOUNTER — APPOINTMENT (OUTPATIENT)
Dept: GASTROENTEROLOGY | Facility: CLINIC | Age: 80
End: 2025-07-08
Payer: MEDICARE

## 2025-07-08 VITALS
TEMPERATURE: 97.3 F | BODY MASS INDEX: 28.49 KG/M2 | HEIGHT: 65 IN | SYSTOLIC BLOOD PRESSURE: 142 MMHG | RESPIRATION RATE: 17 BRPM | WEIGHT: 171 LBS | HEART RATE: 73 BPM | DIASTOLIC BLOOD PRESSURE: 68 MMHG | OXYGEN SATURATION: 96 %

## 2025-07-08 PROBLEM — K62.5 BRBPR (BRIGHT RED BLOOD PER RECTUM): Status: ACTIVE | Noted: 2025-07-08

## 2025-07-08 PROCEDURE — 99204 OFFICE O/P NEW MOD 45 MIN: CPT

## 2025-07-08 RX ORDER — POLYETHYLENE GLYCOL-3350 AND ELECTROLYTES WITH FLAVOR PACK 240; 5.84; 2.98; 6.72; 22.72 G/278.26G; G/278.26G; G/278.26G; G/278.26G; G/278.26G
240 POWDER, FOR SOLUTION ORAL
Qty: 1 | Refills: 0 | Status: ACTIVE | COMMUNITY
Start: 2025-07-08 | End: 1900-01-01

## 2025-07-16 ENCOUNTER — APPOINTMENT (OUTPATIENT)
Dept: INFUSION THERAPY | Facility: CLINIC | Age: 80
End: 2025-07-16

## 2025-07-16 ENCOUNTER — APPOINTMENT (OUTPATIENT)
Dept: UROLOGY | Facility: CLINIC | Age: 80
End: 2025-07-16
Payer: MEDICARE

## 2025-07-16 ENCOUNTER — APPOINTMENT (OUTPATIENT)
Dept: HEMATOLOGY ONCOLOGY | Facility: CLINIC | Age: 80
End: 2025-07-16
Payer: MEDICARE

## 2025-07-16 ENCOUNTER — OUTPATIENT (OUTPATIENT)
Dept: OUTPATIENT SERVICES | Facility: HOSPITAL | Age: 80
LOS: 1 days | End: 2025-07-16

## 2025-07-16 VITALS
RESPIRATION RATE: 18 BRPM | TEMPERATURE: 98.7 F | OXYGEN SATURATION: 100 % | SYSTOLIC BLOOD PRESSURE: 130 MMHG | BODY MASS INDEX: 28.49 KG/M2 | HEIGHT: 65 IN | DIASTOLIC BLOOD PRESSURE: 74 MMHG | HEART RATE: 75 BPM | WEIGHT: 171 LBS

## 2025-07-16 VITALS
DIASTOLIC BLOOD PRESSURE: 73 MMHG | HEIGHT: 65 IN | BODY MASS INDEX: 28.49 KG/M2 | TEMPERATURE: 98.1 F | HEART RATE: 73 BPM | SYSTOLIC BLOOD PRESSURE: 136 MMHG | WEIGHT: 171 LBS

## 2025-07-16 DIAGNOSIS — C61 MALIGNANT NEOPLASM OF PROSTATE: ICD-10-CM

## 2025-07-16 LAB
ALBUMIN SERPL ELPH-MCNC: 3.7 G/DL
ALP BLD-CCNC: 43 U/L
ALT SERPL-CCNC: 16 U/L
ANION GAP SERPL CALC-SCNC: -4 MMOL/L
AST SERPL-CCNC: 20 U/L
BILIRUB SERPL-MCNC: 0.8 MG/DL
BUN SERPL-MCNC: 13 MG/DL
CALCIUM SERPL-MCNC: 10.2 MG/DL
CHLORIDE SERPL-SCNC: 117 MMOL/L
CO2 SERPL-SCNC: 27 MMOL/L
CREAT SERPL-MCNC: 1.2 MG/DL
EGFRCR SERPLBLD CKD-EPI 2021: 62 ML/MIN/1.73M2
GLUCOSE SERPL-MCNC: 108 MG/DL
HCT VFR BLD CALC: 33.8 %
HGB BLD-MCNC: 10.9 G/DL
LYMPHOCYTES # BLD AUTO: 2.4 K/UL
LYMPHOCYTES NFR BLD AUTO: 29.1 %
MAN DIFF?: NO
MCHC RBC-ENTMCNC: 30.1 PG
MCHC RBC-ENTMCNC: 32.2 G/DL
MCV RBC AUTO: 93.4 FL
NEUTROPHILS # BLD AUTO: 5.4 K/UL
NEUTROPHILS NFR BLD AUTO: 63.3 %
PLATELET # BLD AUTO: 292 K/UL
POTASSIUM SERPL-SCNC: 4.9 MMOL/L
PROT SERPL-MCNC: 6.9 G/DL
RBC # BLD: 3.62 M/UL
RBC # FLD: 16.6 %
SODIUM SERPL-SCNC: 140 MMOL/L
WBC # FLD AUTO: 8.4 K/UL

## 2025-07-16 PROCEDURE — 36415 COLL VENOUS BLD VENIPUNCTURE: CPT

## 2025-07-16 PROCEDURE — 96402 CHEMO HORMON ANTINEOPL SQ/IM: CPT

## 2025-07-16 PROCEDURE — 99214 OFFICE O/P EST MOD 30 MIN: CPT | Mod: 25

## 2025-07-16 RX ORDER — LEUPROLIDE ACETATE 22.5 MG
22.5 KIT INTRAMUSCULAR
Qty: 1 | Refills: 0 | Status: COMPLETED | OUTPATIENT
Start: 2025-07-16

## 2025-07-16 RX ADMIN — LEUPROLIDE ACETATE 0 MG: KIT at 00:00

## 2025-07-17 ENCOUNTER — NON-APPOINTMENT (OUTPATIENT)
Age: 80
End: 2025-07-17

## 2025-07-17 LAB
PSA SERPL-MCNC: <0.01 NG/ML
TESTOST SERPL-MCNC: <2.5 NG/DL

## 2025-07-18 PROBLEM — D64.9 ANEMIA: Status: ACTIVE | Noted: 2025-07-18

## 2025-08-26 ENCOUNTER — APPOINTMENT (OUTPATIENT)
Dept: GASTROENTEROLOGY | Facility: HOSPITAL | Age: 80
End: 2025-08-26

## 2025-08-26 ENCOUNTER — RESULT REVIEW (OUTPATIENT)
Age: 80
End: 2025-08-26

## 2025-08-26 ENCOUNTER — OUTPATIENT (OUTPATIENT)
Dept: OUTPATIENT SERVICES | Facility: HOSPITAL | Age: 80
LOS: 1 days | Discharge: HOME | End: 2025-08-26
Payer: MEDICARE

## 2025-08-26 VITALS
TEMPERATURE: 98 F | HEIGHT: 66 IN | WEIGHT: 167.99 LBS | SYSTOLIC BLOOD PRESSURE: 159 MMHG | RESPIRATION RATE: 18 BRPM | HEART RATE: 66 BPM | OXYGEN SATURATION: 95 % | DIASTOLIC BLOOD PRESSURE: 77 MMHG

## 2025-08-26 PROCEDURE — 45380 COLONOSCOPY AND BIOPSY: CPT

## 2025-08-26 PROCEDURE — 88305 TISSUE EXAM BY PATHOLOGIST: CPT

## 2025-08-26 PROCEDURE — 88305 TISSUE EXAM BY PATHOLOGIST: CPT | Mod: 26

## (undated) DEVICE — FORCEP RADIAL JAW 4 JUMBO 2.8MM 3.2MM 240CM ORANGE DISP

## (undated) DEVICE — ENDOCUFF VISION SZ 2 LG GRN